# Patient Record
Sex: MALE | Race: AMERICAN INDIAN OR ALASKA NATIVE
[De-identification: names, ages, dates, MRNs, and addresses within clinical notes are randomized per-mention and may not be internally consistent; named-entity substitution may affect disease eponyms.]

---

## 2017-06-13 NOTE — EMERGENCY DEPARTMENT REPORT
ED N/V/D HPI





- General


Chief complaint: Abdominal Pain


Stated complaint: STOMACH CRAMPS


Time Seen by Provider: 06/13/17 07:20


Source: patient


Mode of arrival: Ambulatory


Limitations: No Limitations





- History of Present Illness


Initial comments: 





Travels often for work.  Reports bilateral leg pain


MD complaint: diarrhea


-: Gradual, week(s)


Description of Vomiting: food contents, watery


Description of Diarrhea: water


Associated Abdominal Pain: Yes


Location: periumbillcal (cramping intermittent)


Radiation: none


Severity: mild


Pain Scale: 1


Quality: cramping


Consistency: intermittent


Improves with: none


Worsens with: eating


Context: other (history Type 1 diabetes reports compliant with medications, 

sugars controlled)


Associated Symptoms: denies: myalgias, chest pain, cough, diaphoresis, fever/

chills, headaches, loss of appetite, malaise, nausea/vomiting, rash, dysuria, 

shortness of breath, syncope, weakness





- Related Data


 Previous Rx's











 Medication  Instructions  Recorded  Last Taken  Type


 


Loperamide HCl [Imodium A-D] 2 mg PO Q12HR PRN #14 tablet 06/13/17 Unknown Rx











 Allergies











Allergy/AdvReac Type Severity Reaction Status Date / Time


 


No Known Allergies Allergy   Unverified 06/13/17 05:37














ED Review of Systems


ROS: 


Stated complaint: STOMACH CRAMPS


Other details as noted in HPI





Other: 





GENERAL: No weight change, fatigue, weakness, fever, chills, or night sweats


SKIN: No changes in skin or hair, no itching, no rashes, no jaundice


HEAD: No trauma, headache, or visual changes


EYES: No blurriness, tearing, itching, acute visual loss, conjunctival 

discoloration, or scleral icterus


EARS: No hearing loss,  tinnitus, vertigo, or earache


NOSE: No rhinorrhea, stuffiness, sneezing, itching, or epistaxis


MOUTH: No bleeding gums, hoarseness, sore throat, or swelling


CARDIAC: No new murmur, chest pain, palpitations, dyspnea on exertion, orthopnea

, PND, or edema


RESPIRATORY: No shortness of breath, wheeze, cough, sputum production, 

hemoptysis, pneumonia, asthma, bronchitis, or emphysema


GI: abdominal pain, diarrhea


URINARY: No frequency, urgency, polyuria, dysuria, hematuria, or incontinence


MUSCULOSKELETAL: No muscle weakness, joint stiffness, decrease in range of 

motion, redness, swelling, tenderness


NEUROLOGIC: No loss of sensation, numbness, tingling, tremors, weakness, 

paralysis, seizures


HEMATOLOGIC: No anemia, easy bruising, bleeding, petechiae, or purpura


ENDOCRINE: No hot or cold intolerance, sweating, polyuria, polydipsia or, 

polyphagia no thyroid problems


PSYCHIATRIC: No change in mood, no anxiety, no depression

















ED Past Medical Hx





- Past Medical History


Previous Medical History?: Yes


Hx Diabetes: Yes





- Surgical History


Past Surgical History?: No





- Social History


Smoking Status: Never Smoker





- Medications


Home Medications: 


 Home Medications











 Medication  Instructions  Recorded  Confirmed  Last Taken  Type


 


Loperamide HCl [Imodium A-D] 2 mg PO Q12HR PRN #14 tablet 06/13/17  Unknown Rx














ED Physical Exam





- General


Limitations: No Limitations





- Other


Other exam information: 





GENERAL: Patient in no acute distress


HEAD: Normocephalic, atraumatic


EYES: PERRLA, EOM intact, no scleral icterus, no conjunctival hemorrhage, 

visual fields and acuity wnl,


NOSE: No tenderness, discharge, sinus tenderness


MOUTH: No erythema, bleeding, exudate


HEART: Regular rate and rhythm, no murmur, S1-S2 are auscultated, pulses are 

symmetric


LUNGS: No wheezing, rales, rhonchi, bilateral breath sounds


ABDOMEN: Normal bowel sounds, no tenderness, no rebound, no guarding, no masses

, no CVA tenderness


MUSCULOSKELETAL: Normal joint range of motion, no redness, no swelling, no 

tenderness


NEUROLOGIC: GCS 15, Alert and Oriented x3, Cranial nerves intact, normal 

sensation, normal strength, normal gait, no cerebellar deficit


PSYCHIATRIC: No homicidal or suicidal ideation, no anxiety, no depression, no 

hallucinations


SKIN: Skin is warm and dry, no wounds, no rashes








ED Course


 Vital Signs











  06/13/17 06/13/17 06/13/17





  05:38 07:00 07:07


 


Temperature 97.8 F 97.8 F 


 


Pulse Rate 102 H 94 H 


 


Respiratory 18 20 20





Rate   


 


Blood Pressure 132/82  


 


Blood Pressure  127/93 





[Right]   


 


O2 Sat by Pulse 100 99 





Oximetry   














  06/13/17 06/13/17 06/13/17





  07:54 09:15 11:59


 


Temperature   98.3 F


 


Pulse Rate 93 H 90 89


 


Respiratory 16 16 16





Rate   


 


Blood Pressure   


 


Blood Pressure 115/86 130/92 140/99





[Right]   


 


O2 Sat by Pulse 100 100 100





Oximetry   














ED Medical Decision Making





- Lab Data


Result diagrams: 


 06/13/17 05:43





 06/13/17 07:43





- EKG Data


Interpretation: no acute changes





- Radiology Data


Radiology results: report reviewed





- Medical Decision Making





Patient comfortable.  Updated with results.  Plan discharge with outpatient 

follow-up.  Patient agrees with plan and will return if symptoms worsen.


Critical care attestation.: 


If time is entered above; I have spent that time in minutes in the direct care 

of this critically ill patient, excluding procedure time.








ED Disposition


Clinical Impression: 


 Biliary colic, Leg pain, bilateral





Diarrhea


Qualifiers:


 Diarrhea type: unspecified type Qualified Code(s): R19.7 - Diarrhea, 

unspecified





Abdominal pain


Qualifiers:


 Abdominal location: unspecified location Qualified Code(s): R10.9 - 

Unspecified abdominal pain





Uncontrolled diabetes mellitus


Qualifiers:


 Diabetes mellitus type: type 1 Diabetes mellitus complication status: with 

unspecified complications Qualified Code(s): E10.8 - Type 1 diabetes mellitus 

with unspecified complications





Disposition: DC-01 TO HOME OR SELFCARE


Is pt being admited?: No


Condition: Stable


Instructions:  Biliary Colic (ED), Acute Diarrhea (ED), Abdominal Pain (ED), 

Diabetic Hyperglycemia (ED)


Prescriptions: 


Loperamide HCl [Imodium A-D] 2 mg PO Q12HR PRN #14 tablet


 PRN Reason: Diarrhea


Referrals: 


PRIMARY CARE,MD [Primary Care Provider] - 2-3 Days


[facility] [Provider Group] - 2-3 Days


Time of Disposition: 12:05

## 2017-06-13 NOTE — ULTRASOUND REPORT
ULTRASOUND ABDOMEN COMPLETE



INDICATION: Right upper quadrant pain.  Evaluate for gallstones.



COMPARISON: None. 



FINDINGS: Abdominal sonography suggests slight diffuse hepatic 

echogenic coarsening.  Grossly preserved contours.  Right hepatic lobe 

though estimated at 18.7 cm in length.  No definite focal suspicious 

lesions or biliary dilatation. No gallstones, pericholecystic fluid or 

positive sonographic Vasquez's sign. Gallbladder though suboptimally 

distended with wall thickness of 2.5 mm.  Common bile duct is 2 mm.  

Homogenous, though slightly coarse spleen, approximately 7 cm in 

length.  No ascites.



Imaged pancreas, nonaneurysmal abdominal aorta and IVC within normal 

limits. No hydronephrosis.  Right kidney is 11.2 x 4.1 x 5.3 cm with 

cortical thickness of 1.3 cm.  Left kidney demonstrates AP renal pelvis 

diameter of 1.3 cm, possibly an extrarenal pelvis.  Left kidney 

measures 11 x 4.5 x 4.1 cm with cortical thickness of 1.3 cm.



CONCLUSION: 



1.  Suboptimally distended gallbladder without definite gallstones or 

other sonographic abnormality, as described.



2.  Questionable subtle fatty hepatic infiltration with possible 

hepatomegaly.  Please correlate.



Thank you for the opportunity to participate in this patient's care.

## 2017-06-13 NOTE — XRAY REPORT
ABDOMINAL SERIES



INDICATION: Pain.



COMPARISON: None similar at this institution.



FINDINGS: Abdominal series, three radiographs, demonstrate 

nonobstructive bowel gas pattern without focal suspicious 

calcifications, pneumatosis or pneumoperitoneum.  Possible hepatomegaly.



Accompanying chest radiograph demonstrates normal cardiomediastinal 

silhouette. Clear lungs.



Approximately 6 mm ossific density/spur at the right acetabulum 

laterally.



CONCLUSION: Hepatomegaly suspected without other acute process.  Please 

correlate.



Thank you for the opportunity to participate in this patient's care.

## 2017-06-14 NOTE — VASCULAR LAB REPORT
LOWER EXTREMITY VENOUS DUPLEX:



REASON FOR EXAM: Pain of the lower extremities.



COMMENTS ON THE RIGHT:

All veins visualized are freely compressible without evidence of

internal echogenicity.  Flow is spontaneous and phasic throughout.



COMMENTS ON THE LEFT:

All veins visualized are freely compressible without evidence of

internal echogenicity.  Flow is spontaneous and phasic throughout.



IMPRESSION:

No evidence of acute or chronic deep venous thrombosis in either

lower extremity.

## 2019-11-26 ENCOUNTER — HOSPITAL ENCOUNTER (INPATIENT)
Dept: HOSPITAL 5 - ED | Age: 39
LOS: 9 days | Discharge: HOME | DRG: 871 | End: 2019-12-05
Attending: HOSPITALIST | Admitting: INTERNAL MEDICINE
Payer: COMMERCIAL

## 2019-11-26 DIAGNOSIS — Z71.6: ICD-10-CM

## 2019-11-26 DIAGNOSIS — Z79.4: ICD-10-CM

## 2019-11-26 DIAGNOSIS — E87.0: ICD-10-CM

## 2019-11-26 DIAGNOSIS — E10.43: ICD-10-CM

## 2019-11-26 DIAGNOSIS — E10.649: ICD-10-CM

## 2019-11-26 DIAGNOSIS — D69.6: ICD-10-CM

## 2019-11-26 DIAGNOSIS — R65.20: ICD-10-CM

## 2019-11-26 DIAGNOSIS — E87.6: ICD-10-CM

## 2019-11-26 DIAGNOSIS — Z82.49: ICD-10-CM

## 2019-11-26 DIAGNOSIS — E43: ICD-10-CM

## 2019-11-26 DIAGNOSIS — E83.42: ICD-10-CM

## 2019-11-26 DIAGNOSIS — Z83.3: ICD-10-CM

## 2019-11-26 DIAGNOSIS — E83.39: ICD-10-CM

## 2019-11-26 DIAGNOSIS — A41.9: Primary | ICD-10-CM

## 2019-11-26 DIAGNOSIS — K31.84: ICD-10-CM

## 2019-11-26 DIAGNOSIS — N17.0: ICD-10-CM

## 2019-11-26 DIAGNOSIS — E10.10: ICD-10-CM

## 2019-11-26 DIAGNOSIS — I50.32: ICD-10-CM

## 2019-11-26 DIAGNOSIS — E86.0: ICD-10-CM

## 2019-11-26 DIAGNOSIS — Z79.899: ICD-10-CM

## 2019-11-26 DIAGNOSIS — F17.210: ICD-10-CM

## 2019-11-26 LAB
ALBUMIN SERPL-MCNC: 4.5 G/DL (ref 3.9–5)
ALT SERPL-CCNC: 18 UNITS/L (ref 7–56)
BACTERIA #/AREA URNS HPF: (no result) /HPF
BAND NEUTROPHILS # (MANUAL): 0 K/MM3
BILIRUB DIRECT SERPL-MCNC: < 0.2 MG/DL (ref 0–0.2)
BILIRUB UR QL STRIP: (no result)
BLOOD UR QL VISUAL: (no result)
BUN SERPL-MCNC: 16 MG/DL (ref 9–20)
BUN SERPL-MCNC: 17 MG/DL (ref 9–20)
BUN SERPL-MCNC: 17 MG/DL (ref 9–20)
BUN/CREAT SERPL: 10 %
BUN/CREAT SERPL: 11 %
BUN/CREAT SERPL: 12 %
CALCIUM SERPL-MCNC: 8.2 MG/DL (ref 8.4–10.2)
CALCIUM SERPL-MCNC: 8.4 MG/DL (ref 8.4–10.2)
CALCIUM SERPL-MCNC: 8.5 MG/DL (ref 8.4–10.2)
HCT VFR BLD CALC: 45.3 % (ref 35.5–45.6)
HEMOLYSIS INDEX: 0
HEMOLYSIS INDEX: 17
HEMOLYSIS INDEX: 33
HGB BLD-MCNC: 14.1 GM/DL (ref 11.8–15.2)
MCHC RBC AUTO-ENTMCNC: 31 % (ref 32–34)
MCV RBC AUTO: 91 FL (ref 84–94)
MUCOUS THREADS #/AREA URNS HPF: (no result) /HPF
MYELOCYTES # (MANUAL): 0 K/MM3
PH UR STRIP: 5 [PH] (ref 5–7)
PLATELET # BLD: 347 K/MM3 (ref 140–440)
PROMYELOCYTES # (MANUAL): 0 K/MM3
RBC # BLD AUTO: 4.98 M/MM3 (ref 3.65–5.03)
RBC #/AREA URNS HPF: 3 /HPF (ref 0–6)
TOTAL CELLS COUNTED BLD: 100
UROBILINOGEN UR-MCNC: < 2 MG/DL (ref ?–2)
WBC #/AREA URNS HPF: 1 /HPF (ref 0–6)

## 2019-11-26 PROCEDURE — 85025 COMPLETE CBC W/AUTO DIFF WBC: CPT

## 2019-11-26 PROCEDURE — 36415 COLL VENOUS BLD VENIPUNCTURE: CPT

## 2019-11-26 PROCEDURE — 82550 ASSAY OF CK (CPK): CPT

## 2019-11-26 PROCEDURE — 83690 ASSAY OF LIPASE: CPT

## 2019-11-26 PROCEDURE — 94660 CPAP INITIATION&MGMT: CPT

## 2019-11-26 PROCEDURE — 83880 ASSAY OF NATRIURETIC PEPTIDE: CPT

## 2019-11-26 PROCEDURE — 36600 WITHDRAWAL OF ARTERIAL BLOOD: CPT

## 2019-11-26 PROCEDURE — 84484 ASSAY OF TROPONIN QUANT: CPT

## 2019-11-26 PROCEDURE — 71045 X-RAY EXAM CHEST 1 VIEW: CPT

## 2019-11-26 PROCEDURE — 80307 DRUG TEST PRSMV CHEM ANLYZR: CPT

## 2019-11-26 PROCEDURE — 82962 GLUCOSE BLOOD TEST: CPT

## 2019-11-26 PROCEDURE — 94760 N-INVAS EAR/PLS OXIMETRY 1: CPT

## 2019-11-26 PROCEDURE — 71046 X-RAY EXAM CHEST 2 VIEWS: CPT

## 2019-11-26 PROCEDURE — 80053 COMPREHEN METABOLIC PANEL: CPT

## 2019-11-26 PROCEDURE — 84145 PROCALCITONIN (PCT): CPT

## 2019-11-26 PROCEDURE — 71275 CT ANGIOGRAPHY CHEST: CPT

## 2019-11-26 PROCEDURE — 80048 BASIC METABOLIC PNL TOTAL CA: CPT

## 2019-11-26 PROCEDURE — 85007 BL SMEAR W/DIFF WBC COUNT: CPT

## 2019-11-26 PROCEDURE — 93306 TTE W/DOPPLER COMPLETE: CPT

## 2019-11-26 PROCEDURE — 93970 EXTREMITY STUDY: CPT

## 2019-11-26 PROCEDURE — 80076 HEPATIC FUNCTION PANEL: CPT

## 2019-11-26 PROCEDURE — 85027 COMPLETE CBC AUTOMATED: CPT

## 2019-11-26 PROCEDURE — 81001 URINALYSIS AUTO W/SCOPE: CPT

## 2019-11-26 PROCEDURE — 93005 ELECTROCARDIOGRAM TRACING: CPT

## 2019-11-26 PROCEDURE — 85379 FIBRIN DEGRADATION QUANT: CPT

## 2019-11-26 PROCEDURE — 83735 ASSAY OF MAGNESIUM: CPT

## 2019-11-26 PROCEDURE — 87040 BLOOD CULTURE FOR BACTERIA: CPT

## 2019-11-26 PROCEDURE — 82803 BLOOD GASES ANY COMBINATION: CPT

## 2019-11-26 PROCEDURE — 82553 CREATINE MB FRACTION: CPT

## 2019-11-26 PROCEDURE — 82805 BLOOD GASES W/O2 SATURATION: CPT

## 2019-11-26 PROCEDURE — 96365 THER/PROPH/DIAG IV INF INIT: CPT

## 2019-11-26 PROCEDURE — 86022 PLATELET ANTIBODIES: CPT

## 2019-11-26 PROCEDURE — 93010 ELECTROCARDIOGRAM REPORT: CPT

## 2019-11-26 PROCEDURE — 82140 ASSAY OF AMMONIA: CPT

## 2019-11-26 PROCEDURE — 84132 ASSAY OF SERUM POTASSIUM: CPT

## 2019-11-26 PROCEDURE — 84100 ASSAY OF PHOSPHORUS: CPT

## 2019-11-26 PROCEDURE — 83036 HEMOGLOBIN GLYCOSYLATED A1C: CPT

## 2019-11-26 RX ADMIN — INSULIN HUMAN SCH MLS/HR: 100 INJECTION, SOLUTION PARENTERAL at 20:42

## 2019-11-26 RX ADMIN — HEPARIN SODIUM SCH UNIT: 5000 INJECTION, SOLUTION INTRAVENOUS; SUBCUTANEOUS at 23:34

## 2019-11-26 RX ADMIN — Medication SCH ML: at 22:35

## 2019-11-26 RX ADMIN — SODIUM CHLORIDE SCH MLS/HR: 0.9 INJECTION, SOLUTION INTRAVENOUS at 23:36

## 2019-11-26 NOTE — EMERGENCY DEPARTMENT REPORT
ED General Adult HPI





- General


Chief complaint: Hyperglycemia


Stated complaint: N/V


Time Seen by Provider: 11/26/19 19:32


Source: patient


Mode of arrival: Ambulatory


Limitations: No Limitations





- History of Present Illness


Initial comments: 





39-year-old male with a past medical history of insulin dependent diabetes and 

gastroparesis presents to the hospital complaining of not feeling well the last 

2 days.  Today patient developed nausea, vomiting, or by mouth intolerance.  He 

states he has chronic intermittent diarrhea due to gastroparesis.  He ran out of

his Novolin 70/30 2 days ago.  He also has not taken his regular insulin 

slidding scale dosing in the last 2 days because he has not been eating.  He 

also does not check his glucose on a regular basis.


Severity scale (0 -10): 7





- Related Data


                                Home Medications











 Medication  Instructions  Recorded  Confirmed  Last Taken


 


Insulin NPH Human Isophane 15 units SQ QPM 11/26/19 11/26/19 Unknown





[Novolin N]    


 


Insulin NPH Human Isophane 20 units SQ QAM 11/26/19 11/26/19 Unknown





[Novolin N]    


 


Insulin Regular, Human [Novolin R] 100 unit IJ ACHS 11/26/19 11/26/19 Unknown











                                    Allergies











Allergy/AdvReac Type Severity Reaction Status Date / Time


 


No Known Allergies Allergy   Unverified 06/13/17 05:37














ED Review of Systems


ROS: 


Stated complaint: N/V


Other details as noted in HPI





Comment: All other systems reviewed and negative





ED Past Medical Hx





- Past Medical History


Previous Medical History?: Yes


Hx Diabetes: Yes


Additional medical history: Gastritis, "heart problems"





- Surgical History


Past Surgical History?: Yes


Additional Surgical History: colonoscopy





- Social History


Smoking Status: Former Smoker


Substance Use Type: None





- Medications


Home Medications: 


                                Home Medications











 Medication  Instructions  Recorded  Confirmed  Last Taken  Type


 


Insulin NPH Human Isophane 15 units SQ QPM 11/26/19 11/26/19 Unknown History





[Novolin N]     


 


Insulin NPH Human Isophane 20 units SQ QAM 11/26/19 11/26/19 Unknown History





[Novolin N]     


 


Insulin Regular, Human [Novolin R] 100 unit IJ ACHS 11/26/19 11/26/19 Unknown 

History














ED Physical Exam





- General


Limitations: No Limitations





- Other


Other exam information: 





General: No acute distress


Head: Atraumatic


Eyes: normal appearance


ENT: Dry mucous membranes


Neck: Normal appearance, no midline tenderness


Chest: Clear to auscultation bilaterally with tachypnea


CV: Tachycardia regular rhythm


Abdomen: Soft, normal bowel sounds, mild generalized tenderness, nondistended, 

no rebound or guarding


Back: Normal inspection


Extremity: Normal inspection infection, full range of motion


Neuro: Alert O x 3, no facial asymmetry, speech clear, no gross motor sensory 

deficit


Psych: Appropriate behavior


Skin: No rash





ED Course


                                   Vital Signs











  11/26/19





  18:22


 


Temperature 97.4 F L


 


Pulse Rate 109 H


 


Respiratory 21





Rate 


 


Blood Pressure 127/71





[Left] 


 


O2 Sat by Pulse 100





Oximetry 














ED Medical Decision Making





- Lab Data


Result diagrams: 


                                 11/26/19 20:14





                                 11/26/19 19:40








                                   Lab Results











  11/26/19 11/26/19 11/26/19 Range/Units





  18:43 18:43 18:43 


 


VBG pH     (7.320-7.420)  


 


Sodium   134 L   (137-145)  mmol/L


 


Potassium   4.2   (3.6-5.0)  mmol/L


 


Chloride   92.4 L   ()  mmol/L


 


Carbon Dioxide   3 L*   (22-30)  mmol/L


 


Anion Gap   43   mmol/L


 


BUN   16   (9-20)  mg/dL


 


Creatinine   1.6 H   (0.8-1.5)  mg/dL


 


Estimated GFR   59   ml/min


 


BUN/Creatinine Ratio   10   %


 


Glucose   581 H*   ()  mg/dL


 


POC Glucose     ()  


 


Calcium   8.5   (8.4-10.2)  mg/dL


 


Phosphorus  5.50 H    (2.5-4.5)  mg/dL


 


Magnesium  2.30    (1.7-2.3)  mg/dL


 


Total Bilirubin    < 0.20  (0.1-1.2)  mg/dL


 


Direct Bilirubin    < 0.2  (0-0.2)  mg/dL


 


Indirect Bilirubin    0.0  mg/dL


 


AST    11  (5-40)  units/L


 


ALT    18  (7-56)  units/L


 


Alkaline Phosphatase    166 H  ()  units/L


 


Total Protein    8.0  (6.3-8.2)  g/dL


 


Albumin    4.5  (3.9-5)  g/dL


 


Albumin/Globulin Ratio    1.3  %


 


Lipase     (13-60)  units/L














  11/26/19 11/26/19 11/26/19 Range/Units





  18:43 18:43 19:04 


 


VBG pH  6.993 L*    (7.320-7.420)  


 


Sodium     (137-145)  mmol/L


 


Potassium     (3.6-5.0)  mmol/L


 


Chloride     ()  mmol/L


 


Carbon Dioxide     (22-30)  mmol/L


 


Anion Gap     mmol/L


 


BUN     (9-20)  mg/dL


 


Creatinine     (0.8-1.5)  mg/dL


 


Estimated GFR     ml/min


 


BUN/Creatinine Ratio     %


 


Glucose     ()  mg/dL


 


POC Glucose    437 H  ()  


 


Calcium     (8.4-10.2)  mg/dL


 


Phosphorus     (2.5-4.5)  mg/dL


 


Magnesium     (1.7-2.3)  mg/dL


 


Total Bilirubin     (0.1-1.2)  mg/dL


 


Direct Bilirubin     (0-0.2)  mg/dL


 


Indirect Bilirubin     mg/dL


 


AST     (5-40)  units/L


 


ALT     (7-56)  units/L


 


Alkaline Phosphatase     ()  units/L


 


Total Protein     (6.3-8.2)  g/dL


 


Albumin     (3.9-5)  g/dL


 


Albumin/Globulin Ratio     %


 


Lipase   64 H   (13-60)  units/L














  11/26/19 Range/Units





  19:40 


 


VBG pH   (7.320-7.420)  


 


Sodium  135 L  (137-145)  mmol/L


 


Potassium  3.8  (3.6-5.0)  mmol/L


 


Chloride  94.4 L  ()  mmol/L


 


Carbon Dioxide  2 L*  (22-30)  mmol/L


 


Anion Gap  42  mmol/L


 


BUN  17  (9-20)  mg/dL


 


Creatinine  1.6 H  (0.8-1.5)  mg/dL


 


Estimated GFR  59  ml/min


 


BUN/Creatinine Ratio  11  %


 


Glucose  536 H*  ()  mg/dL


 


POC Glucose   ()  


 


Calcium  8.4  (8.4-10.2)  mg/dL


 


Phosphorus  5.10 H  (2.5-4.5)  mg/dL


 


Magnesium  2.30  (1.7-2.3)  mg/dL


 


Total Bilirubin   (0.1-1.2)  mg/dL


 


Direct Bilirubin   (0-0.2)  mg/dL


 


Indirect Bilirubin   mg/dL


 


AST   (5-40)  units/L


 


ALT   (7-56)  units/L


 


Alkaline Phosphatase   ()  units/L


 


Total Protein   (6.3-8.2)  g/dL


 


Albumin   (3.9-5)  g/dL


 


Albumin/Globulin Ratio   %


 


Lipase   (13-60)  units/L














- EKG Data


-: EKG Interpreted by Me


EKG shows normal: sinus rhythm, ST-T waves (lvh, with repol, no stemi)





- Medical Decision Making





Presents to the ED with DKA likely secondary to medication noncompliance.  

Severe acidosis noted.  Patient admitted to the hospitalist service.  Insulin 

bolus, insulin drip, normal saline, morphine, and Zofran initiated in the ED D. 

 Chest x-ray ordered.  Sepsis protocol initiated by hospitalist.





- Differential Diagnosis


DKA, infection, noncompliance


Critical Care Time: No


Critical care attestation.: 


If time is entered above; I have spent that time in minutes in the direct care 

of this critically ill patient, excluding procedure time.








ED Disposition


Clinical Impression: 


 DKA (diabetic ketoacidoses)





Disposition: DC-09 OP ADMIT IP TO THIS HOSP


Is pt being admited?: Yes


Condition: Stable


Time of Disposition: 20:04 (Dr Meade/hosp)

## 2019-11-26 NOTE — XRAY REPORT
CHEST 1 VIEW 



INDICATION: 

leukocytosis, dka.



COMPARISON: 

None.



FINDINGS:

Support devices: None.



Heart: Within normal limits. 

Lungs/Pleura: No acute air space or interstitial disease. 



Additional findings: None.



IMPRESSION: No acute abnormality.



Signer Name: Eriberto Kuo MD 

Signed: 11/26/2019 10:50 PM

 Workstation Name: [id number]

## 2019-11-26 NOTE — HISTORY AND PHYSICAL REPORT
History of Present Illness


Chief complaint: 





I feel real sick


History of present illness: 


40 YO Male with DM, Gastritis presents to ED for evaluation. Pt states that he 

has experienced nausea, and multiple episodes of vomiting over the past 1 day 

with persistent symptoms over the same time frame. Pt transported to [facility] via 

private vehicle. Pt seen and evaluated in ED and found to have DKA, Sepsis, 

Acidosis, and ARIANNA. Pt found to be critically ill and admitted to ICU and 

initiated on DKA and Sepsis protocols. Pt denies fever, chills, CP, 

palpitations, Trauma, BRBPR, Productive cough, skin rash or recent ill contacts.

Pulmonary team consulted in ED.





Past History


Past Medical History: other (see HPI)


Past Surgical History: No surgical history, Other (reviewed)


Social history: single.  denies: smoking, alcohol abuse, prescription drug abuse


Family history: diabetes, hypertension





Medications and Allergies


                                    Allergies











Allergy/AdvReac Type Severity Reaction Status Date / Time


 


No Known Allergies Allergy   Unverified 06/13/17 05:37











                                Home Medications











 Medication  Instructions  Recorded  Confirmed  Last Taken  Type


 


Insulin NPH Human Isophane 15 units SQ QPM 11/26/19 11/26/19 Unknown History





[Novolin N]     


 


Insulin NPH Human Isophane 20 units SQ QAM 11/26/19 11/26/19 Unknown History





[Novolin N]     


 


Insulin Regular, Human [Novolin R] 100 unit IJ ACHS 11/26/19 11/26/19 Unknown 

History











Active Meds: 


Active Medications





Dextrose (D50w (25gm) Syringe)  0 ml IV Q30MIN PRN; Protocol


   PRN Reason: Hypoglycemia


Insulin Human Regular 100 (units/ Sodium Chloride)  100 mls @ 1 mls/hr IV TITR 

SAI; Protocol


Sodium Chloride (Sodium Chloride Flush Syringe 10 Ml)  10 ml IV BID SAI


Sodium Chloride (Sodium Chloride Flush Syringe 10 Ml)  10 ml IV PRN PRN


   PRN Reason: LINE FLUSH











Review of Systems


Constitutional: no weight loss, no weight gain, no fever, no chills


Ears, nose, mouth and throat: no ear pain, no ear discharge, no decreased 

hearing, no nose pain


Cardiovascular: no chest pain, no orthopnea, no palpitations, no rapid/irregular

heart beat, no syncope


Respiratory: no cough, no cough with sputum, no excessive sputum, no hemoptysis


Gastrointestinal: nausea, vomiting, no diarrhea, no constipation, no change in 

bowel habits


Genitourinary Male: no hematuria, no flank pain, no discharge, no urinary 

hesitancy


Rectal: no pain, no incontinence, no bleeding


Musculoskeletal: no neck stiffness, no neck pain, no shooting arm pain, no arm 

numbness/tingling, no shooting leg pain


Integumentary: no rash, no pruritis, no redness, no sores, no jaundice


Neurological: no transient paralysis, no paralysis, no weakness, no numbness, no

tingling, no seizures


Psychiatric: no anxiety, no change in sleep habits, no sleep disturbances, no 

hypersomnia, no change in libido, no suicidal ideation


Endocrine: no cold intolerance, no heat intolerance, no polyphagia, no 

polydipsia, no polyuria, no nocturia


Hematologic/Lymphatic: no easy bruising, no easy bleeding, no lymphadenopathy


Allergic/Immunologic: no urticaria, no allergic rhinitis, no persistent 

infections, no angioedema





Exam





- Constitutional


Vitals: 


                                        











Temp Pulse Resp BP Pulse Ox


 


 97.4 F L  109 H  21   127/71   100 


 


 11/26/19 18:22  11/26/19 18:22  11/26/19 18:22  11/26/19 18:22  11/26/19 18:22











General appearance: Present: severe distress





- EENT


Eyes: Present: PERRL


ENT: hearing intact, clear oral mucosa





- Neck


Neck: Present: supple, normal ROM





- Respiratory


Respiratory effort: normal


Respiratory: bilateral: CTA





- Cardiovascular


Heart Sounds: Present: S1 & S2.  Absent: rub, click





- Extremities


Extremities: pulses symmetrical, No edema


Peripheral Pulses: within normal limits





- Abdominal


General gastrointestinal: Present: soft, non-tender, non-distended, normal bowel

sounds


Male genitourinary: Present: normal





- Integumentary


Integumentary: Present: clear, warm, dry





- Musculoskeletal


Musculoskeletal: generalized weakness





- Psychiatric


Psychiatric: appropriate mood/affect, intact judgment & insight





- Neurologic


Neurologic: CNII-XII intact, moves all extremities





Results





- Labs


CBC & Chem 7: 


                                 11/26/19 20:14





                                 11/26/19 19:40


Labs: 


                              Abnormal lab results











  11/26/19 11/26/19 11/26/19 Range/Units





  18:43 18:43 18:43 


 


VBG pH     (7.320-7.420)  


 


Sodium   134 L   (137-145)  mmol/L


 


Chloride   92.4 L   ()  mmol/L


 


Carbon Dioxide   3 L*   (22-30)  mmol/L


 


Creatinine   1.6 H   (0.8-1.5)  mg/dL


 


Glucose   581 H*   ()  mg/dL


 


POC Glucose     ()  


 


Phosphorus  5.50 H    (2.5-4.5)  mg/dL


 


Alkaline Phosphatase    166 H  ()  units/L


 


Lipase     (13-60)  units/L














  11/26/19 11/26/19 11/26/19 Range/Units





  18:43 18:43 19:04 


 


VBG pH  6.993 L*    (7.320-7.420)  


 


Sodium     (137-145)  mmol/L


 


Chloride     ()  mmol/L


 


Carbon Dioxide     (22-30)  mmol/L


 


Creatinine     (0.8-1.5)  mg/dL


 


Glucose     ()  mg/dL


 


POC Glucose    437 H  ()  


 


Phosphorus     (2.5-4.5)  mg/dL


 


Alkaline Phosphatase     ()  units/L


 


Lipase   64 H   (13-60)  units/L














Assessment and Plan





- Patient Problems


(1) DKA (diabetic ketoacidoses)


Current Visit: No   Status: Acute   


Plan to address problem: 


Admit to ICU, IVF resuscitation therapy, serial BMP, serial lactic acid, Insulin

drip. 





The high probability of a clinically significant, sudden or life threatening 

deterioration of the [Neuro, renal, respiratory] system(s) required my full and 

direct attention, intervention and personal management. The aggregate critical 

care time was [65] minutes. This time is in addition to time spent performing 

reported procedures but includes the following: 





[x] Data Review and interpretation 





[x] Patient assessment and monitoring of vital signs 





[x] Documentation 





[x] Medication orders and management








(2) Sepsis


Current Visit: Yes   Status: Acute   


Qualifiers: 


   Sepsis acute organ dysfunction status: with acute organ dysfunction 


Plan to address problem: 


Initiate Sepsis Protocol: IV antibiotic therapy, IVF resuscitation therapy, 

blood cultures, serial lactic acid, chest x ray, CBC, CMP, 








(3) Acidosis


Current Visit: Yes   Status: Acute   


Plan to address problem: 


Serial lactic acid level, IVF resuscitation therapy








(4) ARIANNA (acute kidney injury)


Current Visit: Yes   Status: Acute   


Plan to address problem: 


IVF resuscitation therapy, bmp, monitor uop q shift.








(5) DVT prophylaxis


Current Visit: Yes   Status: Acute   


Plan to address problem: 


SCD to BLE while in bed, prophylactic heparin

## 2019-11-27 LAB
BUN SERPL-MCNC: 12 MG/DL (ref 9–20)
BUN SERPL-MCNC: 12 MG/DL (ref 9–20)
BUN SERPL-MCNC: 14 MG/DL (ref 9–20)
BUN SERPL-MCNC: 9 MG/DL (ref 9–20)
BUN SERPL-MCNC: 9 MG/DL (ref 9–20)
BUN/CREAT SERPL: 12 %
BUN/CREAT SERPL: 6 %
BUN/CREAT SERPL: 8 %
BUN/CREAT SERPL: 8 %
BUN/CREAT SERPL: 9 %
CALCIUM SERPL-MCNC: 7.1 MG/DL (ref 8.4–10.2)
CALCIUM SERPL-MCNC: 7.5 MG/DL (ref 8.4–10.2)
CALCIUM SERPL-MCNC: 8.2 MG/DL (ref 8.4–10.2)
CALCIUM SERPL-MCNC: 8.3 MG/DL (ref 8.4–10.2)
CALCIUM SERPL-MCNC: 8.4 MG/DL (ref 8.4–10.2)
HEMOLYSIS INDEX: 10
HEMOLYSIS INDEX: 12
HEMOLYSIS INDEX: 4
HEMOLYSIS INDEX: 4
HEMOLYSIS INDEX: 5

## 2019-11-27 RX ADMIN — POTASSIUM CHLORIDE SCH MLS/HR: 10 INJECTION, SOLUTION INTRAVENOUS at 14:32

## 2019-11-27 RX ADMIN — POTASSIUM CHLORIDE SCH MLS/HR: 10 INJECTION, SOLUTION INTRAVENOUS at 15:47

## 2019-11-27 RX ADMIN — POTASSIUM CHLORIDE SCH MLS/HR: 10 INJECTION, SOLUTION INTRAVENOUS at 11:53

## 2019-11-27 RX ADMIN — CEFTRIAXONE SODIUM SCH MLS/HR: 1 INJECTION, POWDER, FOR SOLUTION INTRAMUSCULAR; INTRAVENOUS at 10:34

## 2019-11-27 RX ADMIN — HEPARIN SODIUM SCH UNIT: 5000 INJECTION, SOLUTION INTRAVENOUS; SUBCUTANEOUS at 10:33

## 2019-11-27 RX ADMIN — POTASSIUM CHLORIDE SCH MLS/HR: 10 INJECTION, SOLUTION INTRAVENOUS at 05:35

## 2019-11-27 RX ADMIN — POTASSIUM CHLORIDE SCH MLS/HR: 10 INJECTION, SOLUTION INTRAVENOUS at 04:40

## 2019-11-27 RX ADMIN — Medication SCH ML: at 22:30

## 2019-11-27 RX ADMIN — HEPARIN SODIUM SCH UNIT: 5000 INJECTION, SOLUTION INTRAVENOUS; SUBCUTANEOUS at 22:29

## 2019-11-27 RX ADMIN — INSULIN LISPRO SCH UNIT: 100 INJECTION, SOLUTION INTRAVENOUS; SUBCUTANEOUS at 22:31

## 2019-11-27 RX ADMIN — POTASSIUM CHLORIDE SCH MLS/HR: 10 INJECTION, SOLUTION INTRAVENOUS at 06:17

## 2019-11-27 RX ADMIN — INSULIN HUMAN SCH MLS/HR: 100 INJECTION, SOLUTION PARENTERAL at 08:54

## 2019-11-27 RX ADMIN — POTASSIUM CHLORIDE SCH MLS/HR: 10 INJECTION, SOLUTION INTRAVENOUS at 12:53

## 2019-11-27 RX ADMIN — FAMOTIDINE SCH MG: 10 INJECTION, SOLUTION INTRAVENOUS at 22:30

## 2019-11-27 RX ADMIN — Medication SCH ML: at 10:34

## 2019-11-27 RX ADMIN — POTASSIUM CHLORIDE SCH MLS/HR: 10 INJECTION, SOLUTION INTRAVENOUS at 02:40

## 2019-11-27 RX ADMIN — SODIUM BICARBONATE SCH MLS/HR: 84 INJECTION, SOLUTION INTRAVENOUS at 15:47

## 2019-11-27 RX ADMIN — SODIUM CHLORIDE SCH MLS/HR: 0.9 INJECTION, SOLUTION INTRAVENOUS at 00:46

## 2019-11-27 RX ADMIN — POTASSIUM CHLORIDE SCH MLS/HR: 10 INJECTION, SOLUTION INTRAVENOUS at 13:52

## 2019-11-27 RX ADMIN — INSULIN HUMAN SCH MLS/HR: 100 INJECTION, SOLUTION PARENTERAL at 12:47

## 2019-11-27 RX ADMIN — POTASSIUM CHLORIDE SCH MLS/HR: 10 INJECTION, SOLUTION INTRAVENOUS at 16:47

## 2019-11-27 NOTE — PROGRESS NOTE
Assessment and Plan


Assessment and plan: 


--DKA (diabetic ketoacidoses)


Current Visit: No   Status: Acute   


On DKA pathway , insulin drip


IVF resuscitation therapy, serial BMP, 





--Severe dehydration;


Vigorous IV hydration





--Sepsis


Current Visit: Yes   Status: Acute   


Initiate Sepsis Protocol: IV antibiotic therapy, 


IVF resuscitation therapy, blood cultures, 





--Hypokalemia;


 replenish per protocol and monitor levels





--Severe Metabolic Acidosis


Current Visit: Yes   Status: Acute  


Aggressive IV hydration, 


IV sodium bicarbonate supportive care 





-- ARIANNA (acute kidney injury)


Current Visit: Yes   Status: Acute   


Vasomotor nephropathy IVF resuscitation therapy, bmp, monitor uop q shift.





-- DVT prophylaxis


Current Visit: Yes   Status: Acute   


SCD to BLE while in bed, prophylactic heparin








The high probability of a clinically significant, sudden or life threatening 

deterioration of the [Neuro, renal,  


respiratory] system(s) required my full and direct attention, intervention and 

personal management. 


The aggregate critical care time was [35] minutes. This time is in addition to 

time spent performing 


reported procedures but includes the following: 





[x] Data Review and interpretation 





[x] Patient assessment and monitoring of vital signs 





[x] Documentation 





[x] Medication orders and management








History


Interval history: 


Patient seen and examined medical records reviewed


Admitted with DKA on DKA protocol


Severe acidosis, elevated anion gap


Patient is severely dehydrated


Mild distress


Vital signs noted








Hospitalist Physical





- Constitutional


Vitals: 


                                        











Temp Pulse Resp BP Pulse Ox


 


 97.4 F L  124 H  25 H  112/64   100 


 


 11/26/19 18:22  11/27/19 10:41  11/27/19 10:41  11/27/19 10:41  11/27/19 10:41











General appearance: Present: mild distress, well-nourished, other (severely 

dehydrated )





- EENT


Eyes: Present: PERRL, EOM intact





- Neck


Neck: Present: supple, normal ROM





- Respiratory


Respiratory effort: normal


Respiratory: bilateral: diminished, negative: rales, rhonchi, wheezing





- Cardiovascular


Rhythm: regular


Heart Sounds: Present: S1 & S2





- Extremities


Extremities: no ischemia, No edema





- Abdominal


General gastrointestinal: soft, non-tender, non-distended, normal bowel sounds





- Integumentary


Integumentary: Present: clear, warm





- Psychiatric


Psychiatric: appropriate mood/affect, cooperative





- Neurologic


Neurologic: moves all extremities





Results





- Labs


CBC & Chem 7: 


                                 11/26/19 20:14





                                 11/27/19 13:36


Labs: 


                             Laboratory Last Values











WBC  24.8 K/mm3 (4.5-11.0)  H  11/26/19  20:14    


 


RBC  4.98 M/mm3 (3.65-5.03)   11/26/19  20:14    


 


Hgb  14.1 gm/dl (11.8-15.2)   11/26/19  20:14    


 


Hct  45.3 % (35.5-45.6)   11/26/19  20:14    


 


MCV  91 fl (84-94)   11/26/19  20:14    


 


MCH  28 pg (28-32)   11/26/19  20:14    


 


MCHC  31 % (32-34)  L  11/26/19  20:14    


 


RDW  14.5 % (13.2-15.2)   11/26/19  20:14    


 


Plt Count  347 K/mm3 (140-440)   11/26/19  20:14    


 


Add Manual Diff  Complete   11/26/19  20:14    


 


Total Counted  100   11/26/19  20:14    


 


Seg Neuts % (Manual)  89.0 % (40.0-70.0)  H  11/26/19  20:14    


 


Band Neutrophils %  0 %  11/26/19  20:14    


 


Lymphocytes % (Manual)  7.0 % (13.4-35.0)  L  11/26/19  20:14    


 


Reactive Lymphs % (Man)  0 %  11/26/19  20:14    


 


Monocytes % (Manual)  4.0 % (0.0-7.3)   11/26/19  20:14    


 


Eosinophils % (Manual)  0 % (0.0-4.3)   11/26/19  20:14    


 


Basophils % (Manual)  0 % (0.0-1.8)   11/26/19  20:14    


 


Metamyelocytes %  0 %  11/26/19  20:14    


 


Myelocytes %  0 %  11/26/19  20:14    


 


Promyelocytes %  0 %  11/26/19  20:14    


 


Blast Cells %  0 %  11/26/19  20:14    


 


Nucleated RBC %  Not Reportable   11/26/19  20:14    


 


Seg Neutrophils # Man  22.1 K/mm3 (1.8-7.7)  H  11/26/19  20:14    


 


Band Neutrophils #  0.0 K/mm3  11/26/19  20:14    


 


Lymphocytes # (Manual)  1.7 K/mm3 (1.2-5.4)   11/26/19  20:14    


 


Abs React Lymphs (Man)  0.0 K/mm3  11/26/19  20:14    


 


Monocytes # (Manual)  1.0 K/mm3 (0.0-0.8)  H  11/26/19  20:14    


 


Eosinophils # (Manual)  0.0 K/mm3 (0.0-0.4)   11/26/19  20:14    


 


Basophils # (Manual)  0.0 K/mm3 (0.0-0.1)   11/26/19  20:14    


 


Metamyelocytes #  0.0 K/mm3  11/26/19  20:14    


 


Myelocytes #  0.0 K/mm3  11/26/19  20:14    


 


Promyelocytes #  0.0 K/mm3  11/26/19  20:14    


 


Blast Cells #  0.0 K/mm3  11/26/19  20:14    


 


WBC Morphology  Not Reportable   11/26/19  20:14    


 


Hypersegmented Neuts  Not Reportable   11/26/19  20:14    


 


Hyposegmented Neuts  Not Reportable   11/26/19  20:14    


 


Hypogranular Neuts  Not Reportable   11/26/19  20:14    


 


Smudge Cells  Not Reportable   11/26/19  20:14    


 


Toxic Granulation  Not Reportable   11/26/19  20:14    


 


Toxic Vacuolation  Not Reportable   11/26/19  20:14    


 


Dohle Bodies  Not Reportable   11/26/19  20:14    


 


Pelger-Huet Anomaly  Not Reportable   11/26/19  20:14    


 


Jose Rods  Not Reportable   11/26/19  20:14    


 


Platelet Estimate  Not Reportable   11/26/19  20:14    


 


Clumped Platelets  Not Reportable   11/26/19  20:14    


 


Plt Clumps, EDTA  Not Reportable   11/26/19  20:14    


 


Large Platelets  Not Reportable   11/26/19  20:14    


 


Giant Platelets  Not Reportable   11/26/19  20:14    


 


Platelet Satelliting  Not Reportable   11/26/19  20:14    


 


Plt Morphology Comment  Not Reportable   11/26/19  20:14    


 


RBC Morphology  Normal   11/26/19  20:14    


 


Dimorphic RBCs  Not Reportable   11/26/19  20:14    


 


Polychromasia  Not Reportable   11/26/19  20:14    


 


Hypochromasia  Not Reportable   11/26/19  20:14    


 


Poikilocytosis  Not Reportable   11/26/19  20:14    


 


Anisocytosis  Not Reportable   11/26/19  20:14    


 


Microcytosis  Not Reportable   11/26/19  20:14    


 


Macrocytosis  Not Reportable   11/26/19  20:14    


 


Spherocytes  Not Reportable   11/26/19  20:14    


 


Pappenheimer Bodies  Not Reportable   11/26/19  20:14    


 


Sickle Cells  Not Reportable   11/26/19  20:14    


 


Target Cells  Not Reportable   11/26/19  20:14    


 


Tear Drop Cells  Not Reportable   11/26/19  20:14    


 


Ovalocytes  Not Reportable   11/26/19  20:14    


 


Helmet Cells  Not Reportable   11/26/19  20:14    


 


[place] Bodies  Not Reportable   11/26/19  20:14    


 


Cabot Rings  Not Reportable   11/26/19  20:14    


 


[place] Cells  Not Reportable   11/26/19  20:14    


 


Bite Cells  Not Reportable   11/26/19  20:14    


 


Crenated Cell  Not Reportable   11/26/19  20:14    


 


Elliptocytes  Not Reportable   11/26/19  20:14    


 


Acanthocytes (Spur)  Not Reportable   11/26/19  20:14    


 


Rouleaux  Not Reportable   11/26/19  20:14    


 


Hemoglobin C Crystals  Not Reportable   11/26/19  20:14    


 


Schistocytes  Not Reportable   11/26/19  20:14    


 


Malaria parasites  Not Reportable   11/26/19  20:14    


 


[place] Bodies  Not Reportable   11/26/19  20:14    


 


Hem Pathologist Commnt  No   11/26/19  20:14    


 


VBG pH  6.993  (7.320-7.420)  L*  11/26/19  18:43    


 


Sodium  148 mmol/L (137-145)  H  11/27/19  10:03    


 


Potassium  3.1 mmol/L (3.6-5.0)  L  11/27/19  10:03    


 


Chloride  114.1 mmol/L ()  H  11/27/19  10:03    


 


Carbon Dioxide  4 mmol/L (22-30)  L*  11/27/19  10:03    


 


Anion Gap  33 mmol/L  11/27/19  10:03    


 


BUN  12 mg/dL (9-20)   11/27/19  10:03    


 


Creatinine  1.5 mg/dL (0.8-1.5)   11/27/19  10:03    


 


Estimated GFR  > 60 ml/min  11/27/19  10:03    


 


BUN/Creatinine Ratio  8 %  11/27/19  10:03    


 


Glucose  364 mg/dL ()  H  11/27/19  10:03    


 


POC Glucose  321  ()  H  11/27/19  10:42    


 


Lactic Acid  1.40 mmol/L (0.7-2.0)   11/27/19  04:13    


 


Calcium  8.3 mg/dL (8.4-10.2)  L  11/27/19  10:03    


 


Phosphorus  5.10 mg/dL (2.5-4.5)  H  11/26/19  19:40    


 


Magnesium  2.30 mg/dL (1.7-2.3)   11/26/19  19:40    


 


Total Bilirubin  < 0.20 mg/dL (0.1-1.2)   11/26/19  18:43    


 


Direct Bilirubin  < 0.2 mg/dL (0-0.2)   11/26/19  18:43    


 


Indirect Bilirubin  0.0 mg/dL  11/26/19  18:43    


 


AST  11 units/L (5-40)   11/26/19  18:43    


 


ALT  18 units/L (7-56)   11/26/19  18:43    


 


Alkaline Phosphatase  166 units/L ()  H  11/26/19  18:43    


 


Total Protein  8.0 g/dL (6.3-8.2)   11/26/19  18:43    


 


Albumin  4.5 g/dL (3.9-5)   11/26/19  18:43    


 


Albumin/Globulin Ratio  1.3 %  11/26/19  18:43    


 


Lipase  64 units/L (13-60)  H  11/26/19  18:43    


 


Urine Color  Straw  (Yellow)   11/26/19  21:29    


 


Urine Turbidity  Clear  (Clear)   11/26/19  21:29    


 


Urine pH  5.0  (5.0-7.0)   11/26/19  21:29    


 


Ur Specific Gravity  1.013  (1.003-1.030)   11/26/19  21:29    


 


Urine Protein  100 mg/dl mg/dL (Negative)   11/26/19  21:29    


 


Urine Glucose (UA)  >=500 mg/dL (Negative)   11/26/19  21:29    


 


Urine Ketones  80 mg/dL (Negative)   11/26/19  21:29    


 


Urine Blood  Sm  (Negative)   11/26/19  21:29    


 


Urine Nitrite  Neg  (Negative)   11/26/19  21:29    


 


Urine Bilirubin  Neg  (Negative)   11/26/19  21:29    


 


Urine Urobilinogen  < 2.0 mg/dL (<2.0)   11/26/19  21:29    


 


Ur Leukocyte Esterase  Neg  (Negative)   11/26/19  21:29    


 


Urine WBC (Auto)  1.0 /HPF (0.0-6.0)   11/26/19  21:29    


 


Urine RBC (Auto)  3.0 /HPF (0.0-6.0)   11/26/19  21:29    


 


U Epithel Cells (Auto)  1.0 /HPF (0-13.0)   11/26/19  21:29    


 


Urine Bacteria (Auto)  1+ /HPF (Negative)   11/26/19  21:29    


 


Urine Mucus  Few /HPF  11/26/19  21:29    














Active Medications





- Current Medications


Current Medications: 














Generic Name Dose Route Start Last Admin





  Trade Name Freq  PRN Reason Stop Dose Admin


 


Dextrose  0 ml  11/26/19 17:49 





  D50w (25gm) Syringe  IV  





  Q30MIN PRN  





  Hypoglycemia  





  Protocol  


 


Heparin Sodium (Porcine)  5,000 unit  11/26/19 22:00  11/27/19 10:33





  Heparin  SUB-Q   5,000 unit





  Q12HR SAI   Administration


 


Insulin Human Regular 100  100 mls @ 1 mls/hr  11/26/19 20:00  11/27/19 11:17





  units/ Sodium Chloride  IV   20 units/hr





  TITR SAI   20 mls/hr





    Titration





  Protocol  





  1 UNITS/HR  


 


Sodium Chloride  1,000 mls @ 0 mls/hr  11/26/19 21:00  11/27/19 00:46





  Nacl 0.9% 1000 Ml  IV  11/27/19 21:01  999 mls/hr





  ONCE SAI   Administration





  As Directed  


 


Ceftriaxone Sodium  1 gm in 50 mls @ 100 mls/hr  11/27/19 10:00  11/27/19 10:34





  Rocephin/Ns 1 Gm/50 Ml  IV   100 mls/hr





  Q24HR SAI   Administration





  Protocol  


 


Potassium Chloride 20 meq/  1,010 mls @ 125 mls/hr  11/27/19 02:00  11/27/19 

08:45





  Dextrose/Sodium Chloride  IV   0 mls/hr





  AS DIRECT SAI   Infusion


 


Potassium Chloride  10 meq in 100 mls @ 100 mls/hr  11/27/19 11:00 





  Kcl 10meq/100ml  IV  11/27/19 16:59 





  Q1H SAI  


 


Sodium Bicarbonate 50 meq/  1,050 mls @ 150 mls/hr  11/27/19 11:30 





  Sodium Chloride  IV  





  AS DIRECT SAI  


 


Sodium Chloride  10 ml  11/26/19 22:00  11/27/19 10:34





  Sodium Chloride Flush Syringe 10 Ml  IV   10 ml





  BID SAI   Administration


 


Sodium Chloride  10 ml  11/26/19 20:04 





  Sodium Chloride Flush Syringe 10 Ml  IV  





  PRN PRN  





  LINE FLUSH

## 2019-11-27 NOTE — CONSULTATION
History of Present Illness


Consult date: 11/27/19


Requesting physician: AMY J KOCHERLA


Reason for consult: other (Severe metabolic acidosis, diabetic ketoacidosis)


History of present illness: 





38 YO Male with DM, Gastritis presents to ED for evaluation. Pt states that he 

has experienced nausea, and multiple episodes of vomiting over the past 1 day 

with persistent symptoms over the same time frame. Pt transported to [facility] via 

private vehicle. Pt seen and evaluated in ED and found to have DKA, Sepsis, 

Acidosis, and ARIANNA. Pt found to be critically ill and admitted to ICU and 

initiated on DKA and Sepsis protocols. Pt denies fever, chills, CP, 

palpitations, Trauma, BRBPR, Productive cough, skin rash or recent ill contacts.





I have been consulted for critical care management





Patient has been seen and examined. Vitals, labs, medications, chart, reviewed.


He states he feels very sick. Nausea is better.





Past History


Past Medical History: other (see HPI)


Past Surgical History: No surgical history, Other (reviewed)


Social history: single.  denies: smoking, alcohol abuse, prescription drug abuse


Family history: diabetes, hypertension





Medications and Allergies


                                    Allergies











Allergy/AdvReac Type Severity Reaction Status Date / Time


 


No Known Allergies Allergy   Unverified 06/13/17 05:37











                                Home Medications











 Medication  Instructions  Recorded  Confirmed  Last Taken  Type


 


Insulin NPH Human Isophane 15 units SQ QPM 11/26/19 11/26/19 Unknown History





[Novolin N]     


 


Insulin NPH Human Isophane 20 units SQ QAM 11/26/19 11/26/19 Unknown History





[Novolin N]     


 


Insulin Regular, Human [Novolin R] 100 unit IJ ACHS 11/26/19 11/26/19 Unknown 

History











Active Meds: 


Active Medications





Dextrose (D50w (25gm) Syringe)  0 ml IV Q30MIN PRN; Protocol


   PRN Reason: Hypoglycemia


Heparin Sodium (Porcine) (Heparin)  5,000 unit SUB-Q Q12HR SAI


   Last Admin: 11/27/19 10:33 Dose:  5,000 unit


   Documented by: 


Insulin Human Regular 100 (units/ Sodium Chloride)  100 mls @ 1 mls/hr IV TITR 

SAI; Protocol


   Last Admin: 11/27/19 12:47 Dose:  12.5 units/hr, 12.5 mls/hr


   Documented by: 


Sodium Chloride (Nacl 0.9% 1000 Ml)  1,000 mls @ 0 mls/hr IV ONCE SAI


   Stop: 11/27/19 21:01


   Last Admin: 11/27/19 00:46 Dose:  999 mls/hr


   Documented by: 


Ceftriaxone Sodium (Rocephin/Ns 1 Gm/50 Ml)  1 gm in 50 mls @ 100 mls/hr IV 

Q24HR SAI; Protocol


   Last Admin: 11/27/19 10:34 Dose:  100 mls/hr


   Documented by: 


Potassium Chloride 20 meq/ (Dextrose/Sodium Chloride)  1,010 mls @ 125 mls/hr IV

AS DIRECT SAI


   Last Infusion: 11/27/19 08:45 Dose:  0 mls/hr


   Documented by: 


Potassium Chloride (Kcl 10meq/100ml)  10 meq in 100 mls @ 100 mls/hr IV Q1H SAI


   Stop: 11/27/19 16:59


Sodium Bicarbonate 50 meq/ (Sodium Chloride)  1,050 mls @ 150 mls/hr IV AS 

DIRECT SAI


   Last Admin: 11/27/19 12:49 Dose:  150 mls/hr


   Documented by: 


Sodium Chloride (Nacl 0.9% 1000 Ml)  1,000 mls @ 999 mls/hr IV BOLUS ONE


   Stop: 11/27/19 14:00


Sodium Chloride (Sodium Chloride Flush Syringe 10 Ml)  10 ml IV BID SAI


   Last Admin: 11/27/19 10:34 Dose:  10 ml


   Documented by: 


Sodium Chloride (Sodium Chloride Flush Syringe 10 Ml)  10 ml IV PRN PRN


   PRN Reason: LINE FLUSH











Physical Examination


Vital signs: 


                                   Vital Signs











Temp Pulse Resp BP Pulse Ox


 


 97.4 F L  109 H  21   127/71   100 


 


 11/26/19 18:22  11/26/19 18:22  11/26/19 18:22  11/26/19 18:22  11/26/19 18:22








Reviewed


General appearance: appears uncomfortable


ENT: oropharynx dry


Neck: supple, no JVD


Effort: mildly labored


Ascultation: Bilateral: diminished breath sounds


Cardiovascular: regular rate and rhythm, other (S1,S2, no murmurs)


Gastrointestinal: normoactive bowel sounds, soft, non-tender, non-distended


Integumentary: normal


Extremities: no cyanosis, no edema, no ischemia or petechiae


Musculoskeletal: no deformities


normal mental status, non-focal exam, pupils equal and round, motor strength 

normal and


mood appropriate, anxious





Results





- Laboratory Findings


CBC and BMP: 


                                 11/26/19 20:14





                                 11/27/19 10:03


Abnormal lab findings: 


                                  Abnormal Labs











  11/26/19 11/26/19 11/26/19





  18:43 18:43 18:43


 


WBC   


 


MCHC   


 


Seg Neuts % (Manual)   


 


Lymphocytes % (Manual)   


 


Seg Neutrophils # Man   


 


Monocytes # (Manual)   


 


VBG pH   


 


Sodium   134 L 


 


Potassium   


 


Chloride   92.4 L 


 


Carbon Dioxide   3 L* 


 


Creatinine   1.6 H 


 


Glucose   581 H* 


 


POC Glucose   


 


Lactic Acid   


 


Calcium   


 


Phosphorus  5.50 H  


 


Alkaline Phosphatase    166 H


 


Lipase   














  11/26/19 11/26/19 11/26/19





  18:43 18:43 19:04


 


WBC   


 


MCHC   


 


Seg Neuts % (Manual)   


 


Lymphocytes % (Manual)   


 


Seg Neutrophils # Man   


 


Monocytes # (Manual)   


 


VBG pH  6.993 L*  


 


Sodium   


 


Potassium   


 


Chloride   


 


Carbon Dioxide   


 


Creatinine   


 


Glucose   


 


POC Glucose    437 H


 


Lactic Acid   


 


Calcium   


 


Phosphorus   


 


Alkaline Phosphatase   


 


Lipase   64 H 














  11/26/19 11/26/19 11/26/19





  19:40 20:14 20:45


 


WBC   24.8 H 


 


MCHC   31 L 


 


Seg Neuts % (Manual)   89.0 H 


 


Lymphocytes % (Manual)   7.0 L 


 


Seg Neutrophils # Man   22.1 H 


 


Monocytes # (Manual)   1.0 H 


 


VBG pH   


 


Sodium  135 L  


 


Potassium   


 


Chloride  94.4 L  


 


Carbon Dioxide  2 L*  


 


Creatinine  1.6 H  


 


Glucose  536 H*  


 


POC Glucose    411 H


 


Lactic Acid   


 


Calcium   


 


Phosphorus  5.10 H  


 


Alkaline Phosphatase   


 


Lipase   














  11/26/19 11/26/19 11/26/19





  21:25 21:25 21:59


 


WBC   


 


MCHC   


 


Seg Neuts % (Manual)   


 


Lymphocytes % (Manual)   


 


Seg Neutrophils # Man   


 


Monocytes # (Manual)   


 


VBG pH   


 


Sodium   


 


Potassium  3.5 L  


 


Chloride   


 


Carbon Dioxide  5 L*  


 


Creatinine   


 


Glucose  370 H  


 


POC Glucose    289 H


 


Lactic Acid   2.80 H* 


 


Calcium  8.2 L  


 


Phosphorus   


 


Alkaline Phosphatase   


 


Lipase   














  11/26/19 11/27/19 11/27/19





  23:16 00:32 00:37


 


WBC   


 


MCHC   


 


Seg Neuts % (Manual)   


 


Lymphocytes % (Manual)   


 


Seg Neutrophils # Man   


 


Monocytes # (Manual)   


 


VBG pH   


 


Sodium    148 H


 


Potassium    3.0 L


 


Chloride    116.1 H


 


Carbon Dioxide    8 L*


 


Creatinine   


 


Glucose    135 H


 


POC Glucose  192 H  127 H 


 


Lactic Acid   


 


Calcium    7.1 L


 


Phosphorus   


 


Alkaline Phosphatase   


 


Lipase   














  11/27/19 11/27/19 11/27/19





  01:17 02:17 03:27


 


WBC   


 


MCHC   


 


Seg Neuts % (Manual)   


 


Lymphocytes % (Manual)   


 


Seg Neutrophils # Man   


 


Monocytes # (Manual)   


 


VBG pH   


 


Sodium   


 


Potassium   


 


Chloride   


 


Carbon Dioxide   


 


Creatinine   


 


Glucose   


 


POC Glucose  135 H  166 H  254 H


 


Lactic Acid   


 


Calcium   


 


Phosphorus   


 


Alkaline Phosphatase   


 


Lipase   














  11/27/19 11/27/19 11/27/19





  04:13 04:34 05:41


 


WBC   


 


MCHC   


 


Seg Neuts % (Manual)   


 


Lymphocytes % (Manual)   


 


Seg Neutrophils # Man   


 


Monocytes # (Manual)   


 


VBG pH   


 


Sodium  147 H  


 


Potassium  3.3 L  


 


Chloride  113.4 H  


 


Carbon Dioxide  7 L*  


 


Creatinine   


 


Glucose  320 H  


 


POC Glucose   320 H  333 H


 


Lactic Acid   


 


Calcium  7.5 L  


 


Phosphorus   


 


Alkaline Phosphatase   


 


Lipase   














  11/27/19 11/27/19 11/27/19





  06:20 07:27 07:53


 


WBC   


 


MCHC   


 


Seg Neuts % (Manual)   


 


Lymphocytes % (Manual)   


 


Seg Neutrophils # Man   


 


Monocytes # (Manual)   


 


VBG pH   


 


Sodium   


 


Potassium   


 


Chloride   


 


Carbon Dioxide   


 


Creatinine   


 


Glucose   


 


POC Glucose  363 H  343 H  319 H


 


Lactic Acid   


 


Calcium   


 


Phosphorus   


 


Alkaline Phosphatase   


 


Lipase   














  11/27/19 11/27/19 11/27/19





  08:46 09:45 10:03


 


WBC   


 


MCHC   


 


Seg Neuts % (Manual)   


 


Lymphocytes % (Manual)   


 


Seg Neutrophils # Man   


 


Monocytes # (Manual)   


 


VBG pH   


 


Sodium    148 H


 


Potassium    3.1 L


 


Chloride    114.1 H


 


Carbon Dioxide    4 L*


 


Creatinine   


 


Glucose    364 H


 


POC Glucose  402 H  340 H 


 


Lactic Acid   


 


Calcium    8.3 L


 


Phosphorus   


 


Alkaline Phosphatase   


 


Lipase   














  11/27/19





  10:42


 


WBC 


 


MCHC 


 


Seg Neuts % (Manual) 


 


Lymphocytes % (Manual) 


 


Seg Neutrophils # Man 


 


Monocytes # (Manual) 


 


VBG pH 


 


Sodium 


 


Potassium 


 


Chloride 


 


Carbon Dioxide 


 


Creatinine 


 


Glucose 


 


POC Glucose  321 H


 


Lactic Acid 


 


Calcium 


 


Phosphorus 


 


Alkaline Phosphatase 


 


Lipase 














Assessment and Plan


--DKA (diabetic ketoacidosis), severe metabolic acidosis


Current Visit: No   Status: Acute   


On DKA therapies per protocol


IVF resuscitation therapy, serial BMP, serial lactic acid, Insulin infusion


Serial VBGs





Severe dehydration


Vigorous IV hydration





Sepsis


Current Visit: Yes   Status: Acute   


Initiate Sepsis Protocol: IV antibiotic therapy, IVF resuscitation therapy, 

Blood cultures, 


Serial lactic acid, CXR, CBC, CMP, 





Hypokalemia


 replenish per protocol and monitor levels





Severe Metabolic Acidosis


Current Visit: Yes   Status: Acute  


Aggressive IV hydration, 


IV sodium bicarbonate supportive care 





ARIANNA (acute kidney injury)


Current Visit: Yes   Status: Acute   


Vasomotor nephropathy IVF resuscitation therapy, bmp, monitor uop q shift.





DVT prophylaxis


Current Visit: Yes   Status: Acute   


SCD to BLE while in bed, Heparin








The high probability of a clinically significant, sudden or life threatening 

deterioration of the [Endocrine, renal,  respiratory] system(s) required my full

and direct attention, intervention and personal management. The aggregate 

critical care time was [35] minutes. This time is in addition to time spent 

performing 


reported procedures but includes the following: 





[x] Data Review and interpretation 





[x] Patient assessment and monitoring of vital signs 





[x] Documentation 





[x] Medication orders and management

## 2019-11-28 LAB
ALBUMIN SERPL-MCNC: 3.4 G/DL (ref 3.9–5)
ALT SERPL-CCNC: 11 UNITS/L (ref 7–56)
BAND NEUTROPHILS # (MANUAL): 4.1 K/MM3
BUN SERPL-MCNC: 7 MG/DL (ref 9–20)
BUN/CREAT SERPL: 5 %
BUN/CREAT SERPL: 5 %
BUN/CREAT SERPL: 6 %
CALCIUM SERPL-MCNC: 7.9 MG/DL (ref 8.4–10.2)
CALCIUM SERPL-MCNC: 7.9 MG/DL (ref 8.4–10.2)
CALCIUM SERPL-MCNC: 8.3 MG/DL (ref 8.4–10.2)
CALCIUM SERPL-MCNC: 8.6 MG/DL (ref 8.4–10.2)
CALCIUM SERPL-MCNC: 9.1 MG/DL (ref 8.4–10.2)
HCT VFR BLD CALC: 41.5 % (ref 35.5–45.6)
HEMOLYSIS INDEX: 10
HEMOLYSIS INDEX: 2
HEMOLYSIS INDEX: 4
HEMOLYSIS INDEX: 4
HEMOLYSIS INDEX: 7
HGB BLD-MCNC: 13.2 GM/DL (ref 11.8–15.2)
MCHC RBC AUTO-ENTMCNC: 32 % (ref 32–34)
MCV RBC AUTO: 89 FL (ref 84–94)
MYELOCYTES # (MANUAL): 0 K/MM3
PLATELET # BLD: 237 K/MM3 (ref 140–440)
PROMYELOCYTES # (MANUAL): 0 K/MM3
RBC # BLD AUTO: 4.66 M/MM3 (ref 3.65–5.03)
TOTAL CELLS COUNTED BLD: 100
TOXIC GRANULES BLD QL SMEAR: (no result)

## 2019-11-28 PROCEDURE — 4A033R1 MEASUREMENT OF ARTERIAL SATURATION, PERIPHERAL, PERCUTANEOUS APPROACH: ICD-10-PCS | Performed by: HOSPITALIST

## 2019-11-28 PROCEDURE — 5A09357 ASSISTANCE WITH RESPIRATORY VENTILATION, LESS THAN 24 CONSECUTIVE HOURS, CONTINUOUS POSITIVE AIRWAY PRESSURE: ICD-10-PCS | Performed by: HOSPITALIST

## 2019-11-28 RX ADMIN — METOPROLOL TARTRATE SCH MG: 25 TABLET, FILM COATED ORAL at 21:35

## 2019-11-28 RX ADMIN — Medication SCH ML: at 08:59

## 2019-11-28 RX ADMIN — AZITHROMYCIN SCH MLS/HR: 500 INJECTION, POWDER, LYOPHILIZED, FOR SOLUTION INTRAVENOUS at 19:38

## 2019-11-28 RX ADMIN — FAMOTIDINE SCH MG: 10 INJECTION, SOLUTION INTRAVENOUS at 21:37

## 2019-11-28 RX ADMIN — SODIUM BICARBONATE SCH MLS/HR: 84 INJECTION, SOLUTION INTRAVENOUS at 03:41

## 2019-11-28 RX ADMIN — INSULIN LISPRO SCH UNIT: 100 INJECTION, SOLUTION INTRAVENOUS; SUBCUTANEOUS at 11:53

## 2019-11-28 RX ADMIN — CEFTRIAXONE SODIUM SCH MLS/HR: 1 INJECTION, POWDER, FOR SOLUTION INTRAMUSCULAR; INTRAVENOUS at 11:48

## 2019-11-28 RX ADMIN — INSULIN LISPRO SCH UNIT: 100 INJECTION, SOLUTION INTRAVENOUS; SUBCUTANEOUS at 08:55

## 2019-11-28 RX ADMIN — ENOXAPARIN SODIUM SCH MG: 100 INJECTION SUBCUTANEOUS at 21:36

## 2019-11-28 RX ADMIN — FAMOTIDINE SCH MG: 10 INJECTION, SOLUTION INTRAVENOUS at 08:59

## 2019-11-28 RX ADMIN — HEPARIN SODIUM SCH UNIT: 5000 INJECTION, SOLUTION INTRAVENOUS; SUBCUTANEOUS at 09:03

## 2019-11-28 RX ADMIN — Medication SCH ML: at 21:37

## 2019-11-28 NOTE — XRAY REPORT
CHEST 1 VIEW 



INDICATION: 

respiratory distress.



COMPARISON: 

2 days prior



FINDINGS:

Support devices: None.



Heart: Within normal limits. 

Lungs/Pleura: New moderate interstitial edema. No significant effusion. 



Additional findings: None.



IMPRESSION:

1. New moderate interstitial edema.



Signer Name: Richard Parks MD 

Signed: 11/28/2019 12:09 PM

 Workstation Name: [id number]

## 2019-11-28 NOTE — CAT SCAN REPORT
CTA chest with contrast



INDICATION : SOB/elevated d dimers/r/o PE.



TECHNIQUE:  Axial imaging performed through the chest, with contrast bolus timing set to maximize opa
cification of the pulmonary arteries. 3-plane MIP reformatted images were obtained.  All CT scans at 
this location are performed using CT dose reduction for ALARA by means of automated exposure control.
 



100 mL of intravenous contrast administered.



COMPARISON:  None



FINDINGS:  



Bolus:  Contrast bolus timing is adequate.

PTE:  No filling defect is present to suggest PTE.

Mediastinum:  Heart and great vessels appear normal.  No pathologic mediastinal adenopathy.

Lungs:  Severe interstitial and alveolar edema with small effusions.



Upper abdomen:  Limited imaging of the upper abdomen shows nothing acute.

Bones:  No acute abnormality.



IMPRESSION: 

1. Negative for PTE.

2. Severe interstitial and alveolar edema with small bilateral effusions.



Signer Name: Richard Parks MD 

Signed: 11/28/2019 5:07 PM

 Workstation Name: [id number]

## 2019-11-28 NOTE — PROGRESS NOTE
Assessment and Plan


Assessment and plan: 


--DKA (diabetic ketoacidoses)


Current Visit: No   Status: Acute   


s/p DKA pathway 


IVF resuscitation therapy, serial BMP,


Insulin,encourage fluids 





--Type I DM:


uncontrolled,Accu check,SSC


70/30 insulin ,ADA diet





--Severe dehydration;


Vigorous IV hydration





--Sepsis


Current Visit: Yes   Status: Acute   


Initiate Sepsis Protocol: IV antibiotic therapy, 


IVF resuscitation therapy, blood cultures, 





--Hypokalemia;


 replenish per protocol and monitor levels





--Severe Metabolic Acidosis


Current Visit: Yes   Status: Acute  


Aggressive IV hydration, 


IV sodium bicarbonate supportive care 





-- ARIANNA (acute kidney injury)


Current Visit: Yes   Status: Acute   


Vasomotor nephropathy IVF resuscitation therapy, bmp, monitor uop q shift.





-- DVT prophylaxis


Current Visit: Yes   Status: Acute   


SCD to BLE while in bed, prophylactic heparin








The high probability of a clinically significant, sudden or life threatening 

deterioration of the [Neuro, renal,  


respiratory] system(s) required my full and direct attention, intervention and 

personal management. 


The aggregate critical care time was [35] minutes. This time is in addition to 

time spent performing 


reported procedures but includes the following: 





[x] Data Review and interpretation 





[x] Patient assessment and monitoring of vital signs 





[x] Documentation 





[x] Medication orders and management














History


Interval history: 


Patient seen and examined medical records reviewed


Admitted with DKA ,transferred to floor last night


on IV fluids and long acting Insulin.


Patient wants to go home.


Severe acidosis, on bicarb drip


Vital signs noted








Hospitalist Physical





- Constitutional


Vitals: 


                                        











Temp Pulse Resp BP Pulse Ox


 


 99.3 F   120 H  22   122/80   92 


 


 11/28/19 05:42  11/28/19 05:42  11/28/19 05:42  11/28/19 05:42  11/28/19 05:42











General appearance: Present: no acute distress, well-nourished, other ( 

dehydrated )





- EENT


Eyes: Present: PERRL, EOM intact





- Neck


Neck: Present: supple, normal ROM





- Respiratory


Respiratory effort: normal


Respiratory: bilateral: diminished, negative: rales, rhonchi, wheezing





- Cardiovascular


Rhythm: regular


Heart Sounds: Present: S1 & S2





- Extremities


Extremities: no ischemia, No edema





- Abdominal


General gastrointestinal: soft, non-tender, non-distended, normal bowel sounds





- Integumentary


Integumentary: Present: clear, warm





- Psychiatric


Psychiatric: appropriate mood/affect, cooperative





- Neurologic


Neurologic: CNII-XII intact, moves all extremities





Results





- Labs


CBC & Chem 7: 


                                 11/26/19 20:14





                                 11/28/19 05:00


Labs: 


                             Laboratory Last Values











WBC  24.8 K/mm3 (4.5-11.0)  H  11/26/19  20:14    


 


RBC  4.98 M/mm3 (3.65-5.03)   11/26/19  20:14    


 


Hgb  14.1 gm/dl (11.8-15.2)   11/26/19  20:14    


 


Hct  45.3 % (35.5-45.6)   11/26/19  20:14    


 


MCV  91 fl (84-94)   11/26/19  20:14    


 


MCH  28 pg (28-32)   11/26/19  20:14    


 


MCHC  31 % (32-34)  L  11/26/19  20:14    


 


RDW  14.5 % (13.2-15.2)   11/26/19  20:14    


 


Plt Count  347 K/mm3 (140-440)   11/26/19  20:14    


 


Add Manual Diff  Complete   11/26/19  20:14    


 


Total Counted  100   11/26/19  20:14    


 


Seg Neuts % (Manual)  89.0 % (40.0-70.0)  H  11/26/19  20:14    


 


Band Neutrophils %  0 %  11/26/19  20:14    


 


Lymphocytes % (Manual)  7.0 % (13.4-35.0)  L  11/26/19  20:14    


 


Reactive Lymphs % (Man)  0 %  11/26/19  20:14    


 


Monocytes % (Manual)  4.0 % (0.0-7.3)   11/26/19  20:14    


 


Eosinophils % (Manual)  0 % (0.0-4.3)   11/26/19  20:14    


 


Basophils % (Manual)  0 % (0.0-1.8)   11/26/19  20:14    


 


Metamyelocytes %  0 %  11/26/19  20:14    


 


Myelocytes %  0 %  11/26/19  20:14    


 


Promyelocytes %  0 %  11/26/19  20:14    


 


Blast Cells %  0 %  11/26/19  20:14    


 


Nucleated RBC %  Not Reportable   11/26/19  20:14    


 


Seg Neutrophils # Man  22.1 K/mm3 (1.8-7.7)  H  11/26/19  20:14    


 


Band Neutrophils #  0.0 K/mm3  11/26/19  20:14    


 


Lymphocytes # (Manual)  1.7 K/mm3 (1.2-5.4)   11/26/19  20:14    


 


Abs React Lymphs (Man)  0.0 K/mm3  11/26/19  20:14    


 


Monocytes # (Manual)  1.0 K/mm3 (0.0-0.8)  H  11/26/19  20:14    


 


Eosinophils # (Manual)  0.0 K/mm3 (0.0-0.4)   11/26/19  20:14    


 


Basophils # (Manual)  0.0 K/mm3 (0.0-0.1)   11/26/19  20:14    


 


Metamyelocytes #  0.0 K/mm3  11/26/19  20:14    


 


Myelocytes #  0.0 K/mm3  11/26/19  20:14    


 


Promyelocytes #  0.0 K/mm3  11/26/19  20:14    


 


Blast Cells #  0.0 K/mm3  11/26/19  20:14    


 


WBC Morphology  Not Reportable   11/26/19  20:14    


 


Hypersegmented Neuts  Not Reportable   11/26/19  20:14    


 


Hyposegmented Neuts  Not Reportable   11/26/19  20:14    


 


Hypogranular Neuts  Not Reportable   11/26/19  20:14    


 


Smudge Cells  Not Reportable   11/26/19  20:14    


 


Toxic Granulation  Not Reportable   11/26/19  20:14    


 


Toxic Vacuolation  Not Reportable   11/26/19  20:14    


 


Dohle Bodies  Not Reportable   11/26/19  20:14    


 


Pelger-Huet Anomaly  Not Reportable   11/26/19  20:14    


 


Jose Rods  Not Reportable   11/26/19  20:14    


 


Platelet Estimate  Not Reportable   11/26/19  20:14    


 


Clumped Platelets  Not Reportable   11/26/19  20:14    


 


Plt Clumps, EDTA  Not Reportable   11/26/19  20:14    


 


Large Platelets  Not Reportable   11/26/19  20:14    


 


Giant Platelets  Not Reportable   11/26/19  20:14    


 


Platelet Satelliting  Not Reportable   11/26/19  20:14    


 


Plt Morphology Comment  Not Reportable   11/26/19  20:14    


 


RBC Morphology  Normal   11/26/19  20:14    


 


Dimorphic RBCs  Not Reportable   11/26/19  20:14    


 


Polychromasia  Not Reportable   11/26/19  20:14    


 


Hypochromasia  Not Reportable   11/26/19  20:14    


 


Poikilocytosis  Not Reportable   11/26/19  20:14    


 


Anisocytosis  Not Reportable   11/26/19  20:14    


 


Microcytosis  Not Reportable   11/26/19  20:14    


 


Macrocytosis  Not Reportable   11/26/19  20:14    


 


Spherocytes  Not Reportable   11/26/19  20:14    


 


Pappenheimer Bodies  Not Reportable   11/26/19  20:14    


 


Sickle Cells  Not Reportable   11/26/19  20:14    


 


Target Cells  Not Reportable   11/26/19  20:14    


 


Tear Drop Cells  Not Reportable   11/26/19  20:14    


 


Ovalocytes  Not Reportable   11/26/19  20:14    


 


Helmet Cells  Not Reportable   11/26/19  20:14    


 


Al-Woodridge Bodies  Not Reportable   11/26/19  20:14    


 


Cabot Rings  Not Reportable   11/26/19  20:14    


 


Pioneer Cells  Not Reportable   11/26/19  20:14    


 


Bite Cells  Not Reportable   11/26/19  20:14    


 


Crenated Cell  Not Reportable   11/26/19  20:14    


 


Elliptocytes  Not Reportable   11/26/19  20:14    


 


Acanthocytes (Spur)  Not Reportable   11/26/19  20:14    


 


Rouleaux  Not Reportable   11/26/19  20:14    


 


Hemoglobin C Crystals  Not Reportable   11/26/19  20:14    


 


Schistocytes  Not Reportable   11/26/19  20:14    


 


Malaria parasites  Not Reportable   11/26/19  20:14    


 


Davide Bodies  Not Reportable   11/26/19  20:14    


 


Hem Pathologist Commnt  No   11/26/19  20:14    


 


VBG pH  6.993  (7.320-7.420)  L*  11/26/19  18:43    


 


Sodium  145 mmol/L (137-145)   11/28/19  05:00    


 


Potassium  3.3 mmol/L (3.6-5.0)  L  11/28/19  05:00    


 


Chloride  111.1 mmol/L ()  H  11/28/19  05:00    


 


Carbon Dioxide  7 mmol/L (22-30)  L*  11/28/19  05:00    


 


Anion Gap  30 mmol/L  11/28/19  05:00    


 


BUN  7 mg/dL (9-20)  L  11/28/19  05:00    


 


Creatinine  1.1 mg/dL (0.8-1.5)   11/28/19  05:00    


 


Estimated GFR  > 60 ml/min  11/28/19  05:00    


 


BUN/Creatinine Ratio  6 %  11/28/19  05:00    


 


Glucose  290 mg/dL ()  H  11/28/19  05:00    


 


POC Glucose  390  ()  H  11/28/19  11:21    


 


Lactic Acid  1.40 mmol/L (0.7-2.0)   11/27/19  04:13    


 


Calcium  9.1 mg/dL (8.4-10.2)   11/28/19  05:00    


 


Phosphorus  1.30 mg/dL (2.5-4.5)  L D 11/28/19  05:00    


 


Magnesium  1.90 mg/dL (1.7-2.3)   11/28/19  05:00    


 


Total Bilirubin  < 0.20 mg/dL (0.1-1.2)   11/26/19  18:43    


 


Direct Bilirubin  < 0.2 mg/dL (0-0.2)   11/26/19  18:43    


 


Indirect Bilirubin  0.0 mg/dL  11/26/19  18:43    


 


AST  11 units/L (5-40)   11/26/19  18:43    


 


ALT  18 units/L (7-56)   11/26/19  18:43    


 


Alkaline Phosphatase  166 units/L ()  H  11/26/19  18:43    


 


Total Protein  8.0 g/dL (6.3-8.2)   11/26/19  18:43    


 


Albumin  4.5 g/dL (3.9-5)   11/26/19  18:43    


 


Albumin/Globulin Ratio  1.3 %  11/26/19  18:43    


 


Lipase  64 units/L (13-60)  H  11/26/19  18:43    


 


Urine Color  Straw  (Yellow)   11/26/19  21:29    


 


Urine Turbidity  Clear  (Clear)   11/26/19  21:29    


 


Urine pH  5.0  (5.0-7.0)   11/26/19  21:29    


 


Ur Specific Gravity  1.013  (1.003-1.030)   11/26/19  21:29    


 


Urine Protein  100 mg/dl mg/dL (Negative)   11/26/19  21:29    


 


Urine Glucose (UA)  >=500 mg/dL (Negative)   11/26/19  21:29    


 


Urine Ketones  80 mg/dL (Negative)   11/26/19  21:29    


 


Urine Blood  Sm  (Negative)   11/26/19  21:29    


 


Urine Nitrite  Neg  (Negative)   11/26/19  21:29    


 


Urine Bilirubin  Neg  (Negative)   11/26/19  21:29    


 


Urine Urobilinogen  < 2.0 mg/dL (<2.0)   11/26/19  21:29    


 


Ur Leukocyte Esterase  Neg  (Negative)   11/26/19  21:29    


 


Urine WBC (Auto)  1.0 /HPF (0.0-6.0)   11/26/19  21:29    


 


Urine RBC (Auto)  3.0 /HPF (0.0-6.0)   11/26/19  21:29    


 


U Epithel Cells (Auto)  1.0 /HPF (0-13.0)   11/26/19  21:29    


 


Urine Bacteria (Auto)  1+ /HPF (Negative)   11/26/19  21:29    


 


Urine Mucus  Few /HPF  11/26/19  21:29    














Active Medications





- Current Medications


Current Medications: 














Generic Name Dose Route Start Last Admin





  Trade Name Freq  PRN Reason Stop Dose Admin


 


Al Hydrox/Mg Hydrox/Simethicone  15 ml  11/27/19 19:03 





  Alum-Mag Hydrox-Simeth 338-061-72io/5ml  PO  





  Q4H PRN  





  Indigestion  


 


Dextrose  0 ml  11/26/19 17:49 





  D50w (25gm) Syringe  IV  





  Q30MIN PRN  





  Hypoglycemia  





  Protocol  


 


Famotidine  20 mg  11/27/19 22:00  11/28/19 08:59





  Pepcid  IV   20 mg





  BID SAI   Administration


 


Furosemide  40 mg  11/28/19 12:00  11/28/19 11:33





  Lasix  IV  11/28/19 12:01  40 mg





  ONCE ONE   Administration


 


Heparin Sodium (Porcine)  5,000 unit  11/26/19 22:00  11/28/19 09:03





  Heparin  SUB-Q   5,000 unit





  Q12HR SAI   Administration


 


Ceftriaxone Sodium  1 gm in 50 mls @ 100 mls/hr  11/27/19 10:00  11/27/19 10:34





  Rocephin/Ns 1 Gm/50 Ml  IV  11/29/19 10:29  100 mls/hr





  Q24HR SAI   Administration





  Protocol  


 


Sodium Bicarbonate 50 meq/  1,060 mls @ 125 mls/hr  11/27/19 14:00  11/28/19 

03:41





Potassium Chloride 20 meq/  IV   125 mls/hr





Dextrose  AS DIRECT SAI   Administration


 


Sodium Chloride  1,000 mls @ 125 mls/hr  11/27/19 20:00 





  Nacl 0.9% 1000 Ml  IV  





  AS DIRECT SAI  


 


Potassium Phosphate 40 mmol/  513.3333 mls @ 83 mls/hr  11/28/19 08:30  11/28/19

 08:54





  Sodium Chloride  IV  11/28/19 14:41  83 mls/hr





  ONCE ONE   Administration


 


Insulin Human Isoph/Insulin Regular  20 unit  11/28/19 08:00  11/28/19 09:04





  Humulin 70/30  SUB-Q   20 unit





  BIDDIAB SAI   Administration


 


Insulin Human Lispro  0 unit  11/27/19 22:00  11/28/19 08:55





  Humalog  SUB-Q   8 unit





  ACHS SAI   Administration





  Protocol  


 


Ondansetron HCl  4 mg  11/27/19 19:03  11/27/19 21:20





  Zofran  IV   4 mg





  Q4H PRN   Administration





  Nausea And Vomiting  


 


Sodium Chloride  10 ml  11/26/19 22:00  11/28/19 08:59





  Sodium Chloride Flush Syringe 10 Ml  IV   10 ml





  BID SAI   Administration


 


Sodium Chloride  10 ml  11/26/19 20:04 





  Sodium Chloride Flush Syringe 10 Ml  IV  





  PRN PRN  





  LINE FLUSH  


 


Zolpidem Tartrate  5 mg  11/27/19 19:05 





  Ambien  PO  





  QHS PRN  





  Sleep

## 2019-11-28 NOTE — EVENT NOTE
Date: 11/28/19


Patient was in acute hypoxic respiratory failure with o2 sat in 70s


Code met was called,Resp therapist started on 100% non rebreather and 


later on Bipap with improvement.Patient severly acdotic on bicarb.


Received fluid bolus for met acidosis this morning,Lasix 40mg IV stat.


Transfer to ICU for close observation.


Set of labs,stat Xray,EKG and set of labs.


Plan of care reviewed with the patient and his nurse.





Addendum;


Patient's workup is consistent with 





-DKA


- start DKA protocol with insulin drip/IV fluids





-Elevated D dimers; stat CTA chest, rule out PE





-Chest x-ray mild condition; dose of IV Lasix





Monitor closely and adjust management as needed





Critical care time 35 minutes


Plan of care reviewed with the patient's nurse

## 2019-11-28 NOTE — PROGRESS NOTE
Assessment and Plan








Acute Hypoxemic Respiratory Failure


Bilateral Pulmonary infiltrates (suspect Pulm edema over Pneumonia)


DKA (diabetic ketoacidosis)


severe metabolic acidosis


Severe dehydration


Sepsis


Hypokalemia


Severe Metabolic Acidosis


ARIANNA (acute kidney injury)





- resume DKA protocol


- deploy continuous BIPAP to reduce work of breathing and for alveolar 

recruitment


- watch closely for N&V


- intubation if fails BIPAP


- bicarbonate supplementation acutely 


- hold on diuretics re: DKA


- sputum C&S


- continue empiric Rocephin


- add empiric Zithromax for CAP coverage


- get Procalcitonin and lactate levels to aid clinical decision making


- prn ABG's


- consider ID consultation re: Leucocytosis also


- conservative volume management


- quick ACS w/up with cardiac enzymes, EKG & 2D ECHO


- continue GI & VTE prophylaxis with Famotidine and Enoxaparin


- continue other care per attending / other consuiltants


- transferred to ICU








The high probability of a clinically significant, sudden or life threatening 

deterioration of the [Endocrine, renal,  respiratory] system(s) required my full

and direct attention, intervention and personal management. The aggregate 

critical care time was [35] minutes. This time is in addition to time spent p

erforming 


reported procedures but includes the following: 





[x] Data Review and interpretation 





[x] Patient assessment and monitoring of vital signs 





[x] Documentation 





[x] Medication orders and management








Subjective


Date of service: 11/28/19


Principal diagnosis: Ac Hypoxemic Resp Failure; Jesus. Pulm infiltrates; DKA; 

Sepsis


Interval history: 





Patient is seen today for: Acute Hypoxemic Respiratory Failure; Bilateral 

Pulmonary infiltrates (suspect Pulm edema over Pneumonia); DKA (diabetic 

ketoacidosis); severe metabolic acidosis


Sepsis Syndrome





Seen and examined at bedside; 24-hour events reviewed; nursing and respiratory 

care staff consulted; no adverse overnight events reported to me; resting in bed

 with significantly increased work of breathing; apparently became acutely 

hypoxemic after volume bolus; No reported emesis or overt aspiration; ABG & BMP 

consistent with DKA

















Objective


                               Vital Signs - 12hr











  11/27/19 11/27/19 11/27/19





  22:00 23:00 23:43


 


Temperature   98.7 F


 


Pulse Rate 120 H  106 H


 


Pulse Rate [  116 H 





Left Dorsalis   





Pedis]   


 


Pulse Rate [  116 H 





Left Radial]   


 


Pulse Rate [  116 H 





Right Dorsalis   





Pedis]   


 


Pulse Rate [  116 H 





Right Radial]   


 


Respiratory  22 18





Rate   


 


Blood Pressure   124/77


 


O2 Sat by Pulse  100 93





Oximetry   














  11/28/19





  05:42


 


Temperature 99.3 F


 


Pulse Rate 120 H


 


Pulse Rate [ 





Left Dorsalis 





Pedis] 


 


Pulse Rate [ 





Left Radial] 


 


Pulse Rate [ 





Right Dorsalis 





Pedis] 


 


Pulse Rate [ 





Right Radial] 


 


Respiratory 22





Rate 


 


Blood Pressure 122/80


 


O2 Sat by Pulse 92





Oximetry 











Constitutional: appears uncomfortable, other (Young male, normocephalic with 

moderately increased respiratory effort at rest)


Eyes: non-icteric


ENT: oropharynx moist, other (Mallampati 2)


Neck: supple, no JVD


Effort: very labored


Ascultation: Bilateral: diminished breath sounds, rales


Percussion: Bilateral: not dull


Cardiovascular: regular rate and rhythm, other (S1,S2, no murmurs)


Gastrointestinal: normoactive bowel sounds, soft, non-tender, non-distended


Integumentary: normal


Extremities: no cyanosis, no edema, pulses normal, no ischemia or petechiae


Neurologic: normal mental status, non-focal exam, pupils equal and round, CN II-

XII normal, motor strength normal and


Psychiatric: mood appropriate, anxious


CBC and BMP: 


                                 11/28/19 11:54





                                 11/28/19 11:47


Abnormal lab findings: 


                                  Abnormal Labs











  11/26/19 11/26/19 11/26/19





  18:43 18:43 18:43


 


WBC   


 


MCHC   


 


Seg Neuts % (Manual)   


 


Lymphocytes % (Manual)   


 


Seg Neutrophils # Man   


 


Monocytes # (Manual)   


 


VBG pH   


 


Sodium   134 L 


 


Potassium   


 


Chloride   92.4 L 


 


Carbon Dioxide   3 L* 


 


BUN   


 


Creatinine   1.6 H 


 


Glucose   581 H* 


 


POC Glucose   


 


Lactic Acid   


 


Calcium   


 


Phosphorus  5.50 H  


 


Alkaline Phosphatase    166 H


 


Lipase   














  11/26/19 11/26/19 11/26/19





  18:43 18:43 19:04


 


WBC   


 


MCHC   


 


Seg Neuts % (Manual)   


 


Lymphocytes % (Manual)   


 


Seg Neutrophils # Man   


 


Monocytes # (Manual)   


 


VBG pH  6.993 L*  


 


Sodium   


 


Potassium   


 


Chloride   


 


Carbon Dioxide   


 


BUN   


 


Creatinine   


 


Glucose   


 


POC Glucose    437 H


 


Lactic Acid   


 


Calcium   


 


Phosphorus   


 


Alkaline Phosphatase   


 


Lipase   64 H 














  11/26/19 11/26/19 11/26/19





  19:40 20:14 20:45


 


WBC   24.8 H 


 


MCHC   31 L 


 


Seg Neuts % (Manual)   89.0 H 


 


Lymphocytes % (Manual)   7.0 L 


 


Seg Neutrophils # Man   22.1 H 


 


Monocytes # (Manual)   1.0 H 


 


VBG pH   


 


Sodium  135 L  


 


Potassium   


 


Chloride  94.4 L  


 


Carbon Dioxide  2 L*  


 


BUN   


 


Creatinine  1.6 H  


 


Glucose  536 H*  


 


POC Glucose    411 H


 


Lactic Acid   


 


Calcium   


 


Phosphorus  5.10 H  


 


Alkaline Phosphatase   


 


Lipase   














  11/26/19 11/26/19 11/26/19





  21:25 21:25 21:59


 


WBC   


 


MCHC   


 


Seg Neuts % (Manual)   


 


Lymphocytes % (Manual)   


 


Seg Neutrophils # Man   


 


Monocytes # (Manual)   


 


VBG pH   


 


Sodium   


 


Potassium  3.5 L  


 


Chloride   


 


Carbon Dioxide  5 L*  


 


BUN   


 


Creatinine   


 


Glucose  370 H  


 


POC Glucose    289 H


 


Lactic Acid   2.80 H* 


 


Calcium  8.2 L  


 


Phosphorus   


 


Alkaline Phosphatase   


 


Lipase   














  11/26/19 11/27/19 11/27/19





  23:16 00:32 00:37


 


WBC   


 


MCHC   


 


Seg Neuts % (Manual)   


 


Lymphocytes % (Manual)   


 


Seg Neutrophils # Man   


 


Monocytes # (Manual)   


 


VBG pH   


 


Sodium    148 H


 


Potassium    3.0 L


 


Chloride    116.1 H


 


Carbon Dioxide    8 L*


 


BUN   


 


Creatinine   


 


Glucose    135 H


 


POC Glucose  192 H  127 H 


 


Lactic Acid   


 


Calcium    7.1 L


 


Phosphorus   


 


Alkaline Phosphatase   


 


Lipase   














  11/27/19 11/27/19 11/27/19





  01:17 02:17 03:27


 


WBC   


 


MCHC   


 


Seg Neuts % (Manual)   


 


Lymphocytes % (Manual)   


 


Seg Neutrophils # Man   


 


Monocytes # (Manual)   


 


VBG pH   


 


Sodium   


 


Potassium   


 


Chloride   


 


Carbon Dioxide   


 


BUN   


 


Creatinine   


 


Glucose   


 


POC Glucose  135 H  166 H  254 H


 


Lactic Acid   


 


Calcium   


 


Phosphorus   


 


Alkaline Phosphatase   


 


Lipase   














  11/27/19 11/27/19 11/27/19





  04:13 04:34 05:41


 


WBC   


 


MCHC   


 


Seg Neuts % (Manual)   


 


Lymphocytes % (Manual)   


 


Seg Neutrophils # Man   


 


Monocytes # (Manual)   


 


VBG pH   


 


Sodium  147 H  


 


Potassium  3.3 L  


 


Chloride  113.4 H  


 


Carbon Dioxide  7 L*  


 


BUN   


 


Creatinine   


 


Glucose  320 H  


 


POC Glucose   320 H  333 H


 


Lactic Acid   


 


Calcium  7.5 L  


 


Phosphorus   


 


Alkaline Phosphatase   


 


Lipase   














  11/27/19 11/27/19 11/27/19





  06:20 07:27 07:53


 


WBC   


 


MCHC   


 


Seg Neuts % (Manual)   


 


Lymphocytes % (Manual)   


 


Seg Neutrophils # Man   


 


Monocytes # (Manual)   


 


VBG pH   


 


Sodium   


 


Potassium   


 


Chloride   


 


Carbon Dioxide   


 


BUN   


 


Creatinine   


 


Glucose   


 


POC Glucose  363 H  343 H  319 H


 


Lactic Acid   


 


Calcium   


 


Phosphorus   


 


Alkaline Phosphatase   


 


Lipase   














  11/27/19 11/27/19 11/27/19





  08:46 09:45 10:03


 


WBC   


 


MCHC   


 


Seg Neuts % (Manual)   


 


Lymphocytes % (Manual)   


 


Seg Neutrophils # Man   


 


Monocytes # (Manual)   


 


VBG pH   


 


Sodium    148 H


 


Potassium    3.1 L


 


Chloride    114.1 H


 


Carbon Dioxide    4 L*


 


BUN   


 


Creatinine   


 


Glucose    364 H


 


POC Glucose  402 H  340 H 


 


Lactic Acid   


 


Calcium    8.3 L


 


Phosphorus   


 


Alkaline Phosphatase   


 


Lipase   














  11/27/19 11/27/19 11/27/19





  10:42 11:26 12:53


 


WBC   


 


MCHC   


 


Seg Neuts % (Manual)   


 


Lymphocytes % (Manual)   


 


Seg Neutrophils # Man   


 


Monocytes # (Manual)   


 


VBG pH   


 


Sodium   


 


Potassium   


 


Chloride   


 


Carbon Dioxide   


 


BUN   


 


Creatinine   


 


Glucose   


 


POC Glucose  321 H  241 H  175 H


 


Lactic Acid   


 


Calcium   


 


Phosphorus   


 


Alkaline Phosphatase   


 


Lipase   














  11/27/19 11/27/19 11/27/19





  13:36 13:47 17:36


 


WBC   


 


MCHC   


 


Seg Neuts % (Manual)   


 


Lymphocytes % (Manual)   


 


Seg Neutrophils # Man   


 


Monocytes # (Manual)   


 


VBG pH   


 


Sodium  151 H  


 


Potassium  3.1 L  


 


Chloride  126.5 H  


 


Carbon Dioxide  12 L D  


 


BUN   


 


Creatinine   


 


Glucose  131 H  


 


POC Glucose   120 H  107 H


 


Lactic Acid   


 


Calcium  8.2 L  


 


Phosphorus   


 


Alkaline Phosphatase   


 


Lipase   














  11/27/19 11/27/19 11/27/19





  18:59 20:32 22:05


 


WBC   


 


MCHC   


 


Seg Neuts % (Manual)   


 


Lymphocytes % (Manual)   


 


Seg Neutrophils # Man   


 


Monocytes # (Manual)   


 


VBG pH   


 


Sodium   146 H 


 


Potassium   


 


Chloride   116.4 H 


 


Carbon Dioxide   10 L 


 


BUN   


 


Creatinine   


 


Glucose   267 H 


 


POC Glucose  153 H   336 H


 


Lactic Acid   


 


Calcium   


 


Phosphorus   


 


Alkaline Phosphatase   


 


Lipase   














  11/28/19 11/28/19





  05:00 07:47


 


WBC  


 


MCHC  


 


Seg Neuts % (Manual)  


 


Lymphocytes % (Manual)  


 


Seg Neutrophils # Man  


 


Monocytes # (Manual)  


 


VBG pH  


 


Sodium  


 


Potassium  3.3 L 


 


Chloride  111.1 H 


 


Carbon Dioxide  7 L* 


 


BUN  7 L 


 


Creatinine  


 


Glucose  290 H 


 


POC Glucose   328 H


 


Lactic Acid  


 


Calcium  


 


Phosphorus  1.30 L D 


 


Alkaline Phosphatase  


 


Lipase  











Chest x-ray: image reviewed (acute bilateral pulmonary infiltrates)


CT scan - chest: image reviewed (negative for VTE; bilateral GGO's and 

infiltrates with smal effusions)


Allied health notes reviewed: nursing

## 2019-11-29 LAB
BENZODIAZEPINES SCREEN,URINE: (no result)
BUN SERPL-MCNC: 7 MG/DL (ref 9–20)
BUN SERPL-MCNC: 7 MG/DL (ref 9–20)
BUN/CREAT SERPL: 5 %
BUN/CREAT SERPL: 6 %
CALCIUM SERPL-MCNC: 8 MG/DL (ref 8.4–10.2)
CALCIUM SERPL-MCNC: 8.1 MG/DL (ref 8.4–10.2)
HEMOLYSIS INDEX: 2
HEMOLYSIS INDEX: 24
METHADONE SCREEN,URINE: (no result)
OPIATE SCREEN,URINE: (no result)

## 2019-11-29 PROCEDURE — 02HV33Z INSERTION OF INFUSION DEVICE INTO SUPERIOR VENA CAVA, PERCUTANEOUS APPROACH: ICD-10-PCS | Performed by: HOSPITALIST

## 2019-11-29 PROCEDURE — 5A09357 ASSISTANCE WITH RESPIRATORY VENTILATION, LESS THAN 24 CONSECUTIVE HOURS, CONTINUOUS POSITIVE AIRWAY PRESSURE: ICD-10-PCS | Performed by: HOSPITALIST

## 2019-11-29 RX ADMIN — INSULIN LISPRO SCH UNIT: 100 INJECTION, SOLUTION INTRAVENOUS; SUBCUTANEOUS at 12:00

## 2019-11-29 RX ADMIN — INSULIN LISPRO SCH UNIT: 100 INJECTION, SOLUTION INTRAVENOUS; SUBCUTANEOUS at 17:33

## 2019-11-29 RX ADMIN — AZITHROMYCIN SCH MLS/HR: 500 INJECTION, POWDER, LYOPHILIZED, FOR SOLUTION INTRAVENOUS at 10:38

## 2019-11-29 RX ADMIN — CEFTRIAXONE SODIUM SCH MLS/HR: 1 INJECTION, POWDER, FOR SOLUTION INTRAMUSCULAR; INTRAVENOUS at 10:16

## 2019-11-29 RX ADMIN — METOPROLOL TARTRATE SCH MG: 25 TABLET, FILM COATED ORAL at 10:23

## 2019-11-29 RX ADMIN — POTASSIUM CHLORIDE SCH MEQ: 10 TABLET, EXTENDED RELEASE ORAL at 06:00

## 2019-11-29 RX ADMIN — INSULIN HUMAN SCH UNIT: 100 INJECTION, SUSPENSION SUBCUTANEOUS at 17:32

## 2019-11-29 RX ADMIN — FAMOTIDINE SCH MG: 10 INJECTION, SOLUTION INTRAVENOUS at 22:02

## 2019-11-29 RX ADMIN — POTASSIUM CHLORIDE SCH MLS/HR: 10 INJECTION, SOLUTION INTRAVENOUS at 03:09

## 2019-11-29 RX ADMIN — INSULIN LISPRO SCH: 100 INJECTION, SOLUTION INTRAVENOUS; SUBCUTANEOUS at 22:16

## 2019-11-29 RX ADMIN — POTASSIUM CHLORIDE SCH MEQ: 10 TABLET, EXTENDED RELEASE ORAL at 00:05

## 2019-11-29 RX ADMIN — POTASSIUM CHLORIDE SCH MLS/HR: 10 INJECTION, SOLUTION INTRAVENOUS at 00:05

## 2019-11-29 RX ADMIN — METOPROLOL TARTRATE SCH MG: 25 TABLET, FILM COATED ORAL at 22:02

## 2019-11-29 RX ADMIN — Medication SCH ML: at 10:40

## 2019-11-29 RX ADMIN — Medication SCH ML: at 22:04

## 2019-11-29 RX ADMIN — POTASSIUM CHLORIDE SCH MLS/HR: 10 INJECTION, SOLUTION INTRAVENOUS at 02:04

## 2019-11-29 RX ADMIN — FAMOTIDINE SCH MG: 10 INJECTION, SOLUTION INTRAVENOUS at 10:16

## 2019-11-29 RX ADMIN — POTASSIUM CHLORIDE SCH MEQ: 10 TABLET, EXTENDED RELEASE ORAL at 03:08

## 2019-11-29 RX ADMIN — ENOXAPARIN SODIUM SCH MG: 100 INJECTION SUBCUTANEOUS at 22:03

## 2019-11-29 RX ADMIN — POTASSIUM CHLORIDE SCH MLS/HR: 10 INJECTION, SOLUTION INTRAVENOUS at 01:01

## 2019-11-29 NOTE — VASCULAR LAB REPORT
DUPLEX DOPPLER BILATERAL LOWER EXTREMITY VEINS



INDICATION:

Elevated d-dimer



FINDINGS:

There is no thrombus within the deep veins of either lower extremity from the common femoral to the c
chun veins. There is normal compression and augmentation on spectral analysis.



IMPRESSION:

No sonographic evidence for DVT in either lower extremity.



Signer Name: Florencio Rojo MD 

Signed: 11/29/2019 12:03 PM

 Workstation Name: [email or web address]

## 2019-11-29 NOTE — PROGRESS NOTE
Assessment and Plan








Acute Hypoxemic Respiratory Failure


Bilateral Pulmonary infiltrates (suspect Pulm edema over Pneumonia)


DKA (diabetic ketoacidosis)


severe metabolic acidosis


Severe dehydration


Sepsis


Hypokalemia


Severe Metabolic Acidosis


ARIANNA (acute kidney injury)





- continue  DKA protocol


- keep on continuous BIPAP to reduce work of breathing and for alveolar 

recruitment; attempt to give daytime breaks from tomorrow


- continue to watch closely for N&V


- reduce bicarbonate drip rate as pH improved (stop shortly)


- continue hold on diuretics for now


- follow sputum C&S


- continue empiric Rocephin


- added empiric Zithromax for CAP coverage


- get Procalcitonin and lactate levels to aid clinical decision making


- prn ABG's


- consider ID consultation re: Leucocytosis also


- conservative volume management


- quick ACS w/up with cardiac enzymes, EKG & 2D ECHO


- continue GI & VTE prophylaxis with Famotidine and Enoxaparin


- continue other care per attending / other consuiltants


- transferred to ICU








The high probability of a clinically significant, sudden or life threatening 

deterioration of the [Endocrine, renal,  respiratory] system(s) required my full

and direct attention, intervention and personal management. The aggregate cri

tical care time was [32] minutes. This time is in addition to time spent 

performing 


reported procedures but includes the following: 





[x] Data Review and interpretation 





[x] Patient assessment and monitoring of vital signs 





[x] Documentation 





[x] Medication orders and management











Subjective


Date of service: 11/29/19


Principal diagnosis: Ac Hypoxemic Resp Failure; Jesus. Pulm infiltrates; DKA; 

Sepsis


Interval history: 





Patient is seen today for: Acute Hypoxemic Respiratory Failure; Bilateral 

Pulmonary infiltrates (suspect Pulm edema over Pneumonia); DKA (diabetic 

ketoacidosis); severe metabolic acidosis


Sepsis Syndrome





Seen and examined at bedside; 24-hour events reviewed; nursing and respiratory 

care staff consulted; no adverse overnight events reported to me; resting in 

bed; remains on BIPAP but feels better; remains on IV insulin; about to get a 

PICC line placed; No N/V/F/C and wwork of breathing improved.





Objective


                               Vital Signs - 12hr











  11/29/19 11/29/19 11/29/19





  01:00 02:00 03:00


 


Temperature   


 


Pulse Rate 119 H 124 H 124 H


 


Respiratory 25 H 22 23





Rate   


 


Blood Pressure 114/85 102/73 114/88


 


O2 Sat by Pulse 92 89 92





Oximetry   














  11/29/19 11/29/19 11/29/19





  03:58 04:00 05:00


 


Temperature  99.1 F 


 


Pulse Rate 121 H 121 H 127 H


 


Respiratory 26 H 26 H 25 H





Rate   


 


Blood Pressure 114/88 94/64 114/88


 


O2 Sat by Pulse 90 86 98





Oximetry   














  11/29/19 11/29/19 11/29/19





  06:00 07:55 08:00


 


Temperature   98.8 F


 


Pulse Rate 120 H 114 H 


 


Respiratory 26 H 22 





Rate   


 


Blood Pressure 111/73 100/79 


 


O2 Sat by Pulse 96 99 





Oximetry   














  11/29/19





  10:23


 


Temperature 


 


Pulse Rate 119 H


 


Respiratory 





Rate 


 


Blood Pressure 82/52


 


O2 Sat by Pulse 





Oximetry 











Constitutional: appears uncomfortable, other (Young male, normocephalic with 

mildly increased respiratory effort at rest)


Eyes: non-icteric


ENT: oropharynx moist, other (Mallampati 2)


Neck: supple, no JVD


Effort: mildly labored


Ascultation: Bilateral: diminished breath sounds, rales


Percussion: Bilateral: not dull


Cardiovascular: regular rate and rhythm, other (S1,S2, no murmurs)


Gastrointestinal: normoactive bowel sounds, soft, non-tender, non-distended


Integumentary: normal


Extremities: no cyanosis, no edema, pulses normal, no ischemia or petechiae


Neurologic: normal mental status, non-focal exam, pupils equal and round, CN II-

XII normal, motor strength normal and


Psychiatric: mood appropriate, anxious


CBC and BMP: 


                                 11/28/19 11:54





                                 11/30/19 06:25


ABG, PT/INR, D-dimer: 


ABG











POC ABG pH  7.453  (7.35-7.45)  H  11/28/19  21:30    


 


POC ABG pCO2  30.0  (35-45)  L  11/28/19  21:30    


 


POC ABG HCO3  21.0  (22-26 mml/L)   11/28/19  21:30    


 


POC ABG Total CO2  22  (23-27mmol/L)   11/28/19  21:30    


 


POC ABG O2 Sat  76   11/28/19  21:30    





PT/INR, D-dimer











D-Dimer  5088.17 ng/mlDDU (0-234)  H  11/28/19  11:47    








Abnormal lab findings: 


                                  Abnormal Labs











  11/26/19 11/26/19 11/26/19





  18:43 18:43 18:43


 


WBC   


 


MCHC   


 


Seg Neuts % (Manual)   


 


Lymphocytes % (Manual)   


 


Seg Neutrophils # Man   


 


Lymphocytes # (Manual)   


 


Monocytes # (Manual)   


 


D-Dimer   


 


POC ABG pH   


 


POC ABG pCO2   


 


POC ABG pO2   


 


VBG pH   


 


Sodium   134 L 


 


Potassium   


 


Chloride   92.4 L 


 


Carbon Dioxide   3 L* 


 


BUN   


 


Creatinine   1.6 H 


 


Glucose   581 H* 


 


POC Glucose   


 


Hemoglobin A1c   


 


Lactic Acid   


 


Calcium   


 


Phosphorus  5.50 H  


 


Magnesium   


 


Alkaline Phosphatase    166 H


 


NT-Pro-B Natriuret Pep   


 


Total Protein   


 


Albumin   


 


Lipase   














  11/26/19 11/26/19 11/26/19





  18:43 18:43 19:04


 


WBC   


 


MCHC   


 


Seg Neuts % (Manual)   


 


Lymphocytes % (Manual)   


 


Seg Neutrophils # Man   


 


Lymphocytes # (Manual)   


 


Monocytes # (Manual)   


 


D-Dimer   


 


POC ABG pH   


 


POC ABG pCO2   


 


POC ABG pO2   


 


VBG pH  6.993 L*  


 


Sodium   


 


Potassium   


 


Chloride   


 


Carbon Dioxide   


 


BUN   


 


Creatinine   


 


Glucose   


 


POC Glucose    437 H


 


Hemoglobin A1c   


 


Lactic Acid   


 


Calcium   


 


Phosphorus   


 


Magnesium   


 


Alkaline Phosphatase   


 


NT-Pro-B Natriuret Pep   


 


Total Protein   


 


Albumin   


 


Lipase   64 H 














  11/26/19 11/26/19 11/26/19





  19:40 20:14 20:45


 


WBC   24.8 H 


 


MCHC   31 L 


 


Seg Neuts % (Manual)   89.0 H 


 


Lymphocytes % (Manual)   7.0 L 


 


Seg Neutrophils # Man   22.1 H 


 


Lymphocytes # (Manual)   


 


Monocytes # (Manual)   1.0 H 


 


D-Dimer   


 


POC ABG pH   


 


POC ABG pCO2   


 


POC ABG pO2   


 


VBG pH   


 


Sodium  135 L  


 


Potassium   


 


Chloride  94.4 L  


 


Carbon Dioxide  2 L*  


 


BUN   


 


Creatinine  1.6 H  


 


Glucose  536 H*  


 


POC Glucose    411 H


 


Hemoglobin A1c   


 


Lactic Acid   


 


Calcium   


 


Phosphorus  5.10 H  


 


Magnesium   


 


Alkaline Phosphatase   


 


NT-Pro-B Natriuret Pep   


 


Total Protein   


 


Albumin   


 


Lipase   














  11/26/19 11/26/19 11/26/19





  21:25 21:25 21:59


 


WBC   


 


MCHC   


 


Seg Neuts % (Manual)   


 


Lymphocytes % (Manual)   


 


Seg Neutrophils # Man   


 


Lymphocytes # (Manual)   


 


Monocytes # (Manual)   


 


D-Dimer   


 


POC ABG pH   


 


POC ABG pCO2   


 


POC ABG pO2   


 


VBG pH   


 


Sodium   


 


Potassium  3.5 L  


 


Chloride   


 


Carbon Dioxide  5 L*  


 


BUN   


 


Creatinine   


 


Glucose  370 H  


 


POC Glucose    289 H


 


Hemoglobin A1c   


 


Lactic Acid   2.80 H* 


 


Calcium  8.2 L  


 


Phosphorus   


 


Magnesium   


 


Alkaline Phosphatase   


 


NT-Pro-B Natriuret Pep   


 


Total Protein   


 


Albumin   


 


Lipase   














  11/26/19 11/27/19 11/27/19





  23:16 00:32 00:37


 


WBC   


 


MCHC   


 


Seg Neuts % (Manual)   


 


Lymphocytes % (Manual)   


 


Seg Neutrophils # Man   


 


Lymphocytes # (Manual)   


 


Monocytes # (Manual)   


 


D-Dimer   


 


POC ABG pH   


 


POC ABG pCO2   


 


POC ABG pO2   


 


VBG pH   


 


Sodium    148 H


 


Potassium    3.0 L


 


Chloride    116.1 H


 


Carbon Dioxide    8 L*


 


BUN   


 


Creatinine   


 


Glucose    135 H


 


POC Glucose  192 H  127 H 


 


Hemoglobin A1c   


 


Lactic Acid   


 


Calcium    7.1 L


 


Phosphorus   


 


Magnesium   


 


Alkaline Phosphatase   


 


NT-Pro-B Natriuret Pep   


 


Total Protein   


 


Albumin   


 


Lipase   














  11/27/19 11/27/19 11/27/19





  01:17 02:17 03:27


 


WBC   


 


MCHC   


 


Seg Neuts % (Manual)   


 


Lymphocytes % (Manual)   


 


Seg Neutrophils # Man   


 


Lymphocytes # (Manual)   


 


Monocytes # (Manual)   


 


D-Dimer   


 


POC ABG pH   


 


POC ABG pCO2   


 


POC ABG pO2   


 


VBG pH   


 


Sodium   


 


Potassium   


 


Chloride   


 


Carbon Dioxide   


 


BUN   


 


Creatinine   


 


Glucose   


 


POC Glucose  135 H  166 H  254 H


 


Hemoglobin A1c   


 


Lactic Acid   


 


Calcium   


 


Phosphorus   


 


Magnesium   


 


Alkaline Phosphatase   


 


NT-Pro-B Natriuret Pep   


 


Total Protein   


 


Albumin   


 


Lipase   














  11/27/19 11/27/19 11/27/19





  04:13 04:34 05:41


 


WBC   


 


MCHC   


 


Seg Neuts % (Manual)   


 


Lymphocytes % (Manual)   


 


Seg Neutrophils # Man   


 


Lymphocytes # (Manual)   


 


Monocytes # (Manual)   


 


D-Dimer   


 


POC ABG pH   


 


POC ABG pCO2   


 


POC ABG pO2   


 


VBG pH   


 


Sodium  147 H  


 


Potassium  3.3 L  


 


Chloride  113.4 H  


 


Carbon Dioxide  7 L*  


 


BUN   


 


Creatinine   


 


Glucose  320 H  


 


POC Glucose   320 H  333 H


 


Hemoglobin A1c   


 


Lactic Acid   


 


Calcium  7.5 L  


 


Phosphorus   


 


Magnesium   


 


Alkaline Phosphatase   


 


NT-Pro-B Natriuret Pep   


 


Total Protein   


 


Albumin   


 


Lipase   














  11/27/19 11/27/19 11/27/19





  06:20 07:27 07:53


 


WBC   


 


MCHC   


 


Seg Neuts % (Manual)   


 


Lymphocytes % (Manual)   


 


Seg Neutrophils # Man   


 


Lymphocytes # (Manual)   


 


Monocytes # (Manual)   


 


D-Dimer   


 


POC ABG pH   


 


POC ABG pCO2   


 


POC ABG pO2   


 


VBG pH   


 


Sodium   


 


Potassium   


 


Chloride   


 


Carbon Dioxide   


 


BUN   


 


Creatinine   


 


Glucose   


 


POC Glucose  363 H  343 H  319 H


 


Hemoglobin A1c   


 


Lactic Acid   


 


Calcium   


 


Phosphorus   


 


Magnesium   


 


Alkaline Phosphatase   


 


NT-Pro-B Natriuret Pep   


 


Total Protein   


 


Albumin   


 


Lipase   














  11/27/19 11/27/19 11/27/19





  08:46 09:45 10:03


 


WBC   


 


MCHC   


 


Seg Neuts % (Manual)   


 


Lymphocytes % (Manual)   


 


Seg Neutrophils # Man   


 


Lymphocytes # (Manual)   


 


Monocytes # (Manual)   


 


D-Dimer   


 


POC ABG pH   


 


POC ABG pCO2   


 


POC ABG pO2   


 


VBG pH   


 


Sodium    148 H


 


Potassium    3.1 L


 


Chloride    114.1 H


 


Carbon Dioxide    4 L*


 


BUN   


 


Creatinine   


 


Glucose    364 H


 


POC Glucose  402 H  340 H 


 


Hemoglobin A1c   


 


Lactic Acid   


 


Calcium    8.3 L


 


Phosphorus   


 


Magnesium   


 


Alkaline Phosphatase   


 


NT-Pro-B Natriuret Pep   


 


Total Protein   


 


Albumin   


 


Lipase   














  11/27/19 11/27/19 11/27/19





  10:42 11:26 12:53


 


WBC   


 


MCHC   


 


Seg Neuts % (Manual)   


 


Lymphocytes % (Manual)   


 


Seg Neutrophils # Man   


 


Lymphocytes # (Manual)   


 


Monocytes # (Manual)   


 


D-Dimer   


 


POC ABG pH   


 


POC ABG pCO2   


 


POC ABG pO2   


 


VBG pH   


 


Sodium   


 


Potassium   


 


Chloride   


 


Carbon Dioxide   


 


BUN   


 


Creatinine   


 


Glucose   


 


POC Glucose  321 H  241 H  175 H


 


Hemoglobin A1c   


 


Lactic Acid   


 


Calcium   


 


Phosphorus   


 


Magnesium   


 


Alkaline Phosphatase   


 


NT-Pro-B Natriuret Pep   


 


Total Protein   


 


Albumin   


 


Lipase   














  11/27/19 11/27/19 11/27/19





  13:36 13:47 17:36


 


WBC   


 


MCHC   


 


Seg Neuts % (Manual)   


 


Lymphocytes % (Manual)   


 


Seg Neutrophils # Man   


 


Lymphocytes # (Manual)   


 


Monocytes # (Manual)   


 


D-Dimer   


 


POC ABG pH   


 


POC ABG pCO2   


 


POC ABG pO2   


 


VBG pH   


 


Sodium  151 H  


 


Potassium  3.1 L  


 


Chloride  126.5 H  


 


Carbon Dioxide  12 L D  


 


BUN   


 


Creatinine   


 


Glucose  131 H  


 


POC Glucose   120 H  107 H


 


Hemoglobin A1c   


 


Lactic Acid   


 


Calcium  8.2 L  


 


Phosphorus   


 


Magnesium   


 


Alkaline Phosphatase   


 


NT-Pro-B Natriuret Pep   


 


Total Protein   


 


Albumin   


 


Lipase   














  11/27/19 11/27/19 11/27/19





  18:59 20:32 22:05


 


WBC   


 


MCHC   


 


Seg Neuts % (Manual)   


 


Lymphocytes % (Manual)   


 


Seg Neutrophils # Man   


 


Lymphocytes # (Manual)   


 


Monocytes # (Manual)   


 


D-Dimer   


 


POC ABG pH   


 


POC ABG pCO2   


 


POC ABG pO2   


 


VBG pH   


 


Sodium   146 H 


 


Potassium   


 


Chloride   116.4 H 


 


Carbon Dioxide   10 L 


 


BUN   


 


Creatinine   


 


Glucose   267 H 


 


POC Glucose  153 H   336 H


 


Hemoglobin A1c   


 


Lactic Acid   


 


Calcium   


 


Phosphorus   


 


Magnesium   


 


Alkaline Phosphatase   


 


NT-Pro-B Natriuret Pep   


 


Total Protein   


 


Albumin   


 


Lipase   














  11/28/19 11/28/19 11/28/19





  05:00 07:47 11:21


 


WBC   


 


MCHC   


 


Seg Neuts % (Manual)   


 


Lymphocytes % (Manual)   


 


Seg Neutrophils # Man   


 


Lymphocytes # (Manual)   


 


Monocytes # (Manual)   


 


D-Dimer   


 


POC ABG pH   


 


POC ABG pCO2   


 


POC ABG pO2   


 


VBG pH   


 


Sodium   


 


Potassium  3.3 L  


 


Chloride  111.1 H  


 


Carbon Dioxide  7 L*  


 


BUN  7 L  


 


Creatinine   


 


Glucose  290 H  


 


POC Glucose   328 H  390 H


 


Hemoglobin A1c   


 


Lactic Acid   


 


Calcium   


 


Phosphorus  1.30 L D  


 


Magnesium   


 


Alkaline Phosphatase   


 


NT-Pro-B Natriuret Pep   


 


Total Protein   


 


Albumin   


 


Lipase   














  11/28/19 11/28/19 11/28/19





  11:47 11:47 11:54


 


WBC   


 


MCHC   


 


Seg Neuts % (Manual)   


 


Lymphocytes % (Manual)   


 


Seg Neutrophils # Man   


 


Lymphocytes # (Manual)   


 


Monocytes # (Manual)   


 


D-Dimer   5088.17 H 


 


POC ABG pH   


 


POC ABG pCO2   


 


POC ABG pO2   


 


VBG pH   


 


Sodium   


 


Potassium   


 


Chloride   


 


Carbon Dioxide  5 L*  


 


BUN  7 L  


 


Creatinine   


 


Glucose   


 


POC Glucose   


 


Hemoglobin A1c    15.1 H


 


Lactic Acid   


 


Calcium   


 


Phosphorus   


 


Magnesium   


 


Alkaline Phosphatase  150 H  


 


NT-Pro-B Natriuret Pep   


 


Total Protein  5.6 L D  


 


Albumin  3.4 L  


 


Lipase   














  11/28/19 11/28/19 11/28/19





  11:54 13:04 14:00


 


WBC  17.7 H  


 


MCHC   


 


Seg Neuts % (Manual)   


 


Lymphocytes % (Manual)  4.0 L  


 


Seg Neutrophils # Man  12.2 H  


 


Lymphocytes # (Manual)  0.7 L  


 


Monocytes # (Manual)   


 


D-Dimer   


 


POC ABG pH   7.076 L 


 


POC ABG pCO2   27.9 L 


 


POC ABG pO2   71 L 


 


VBG pH   


 


Sodium   


 


Potassium   


 


Chloride   


 


Carbon Dioxide   


 


BUN   


 


Creatinine   


 


Glucose   


 


POC Glucose    371 H


 


Hemoglobin A1c   


 


Lactic Acid   


 


Calcium   


 


Phosphorus   


 


Magnesium   


 


Alkaline Phosphatase   


 


NT-Pro-B Natriuret Pep   


 


Total Protein   


 


Albumin   


 


Lipase   














  11/28/19 11/28/19 11/28/19





  15:13 16:09 17:13


 


WBC   


 


MCHC   


 


Seg Neuts % (Manual)   


 


Lymphocytes % (Manual)   


 


Seg Neutrophils # Man   


 


Lymphocytes # (Manual)   


 


Monocytes # (Manual)   


 


D-Dimer   


 


POC ABG pH   


 


POC ABG pCO2   


 


POC ABG pO2   


 


VBG pH   


 


Sodium   


 


Potassium   


 


Chloride   


 


Carbon Dioxide   


 


BUN   


 


Creatinine   


 


Glucose   


 


POC Glucose  259 H  186 H  138 H


 


Hemoglobin A1c   


 


Lactic Acid   


 


Calcium   


 


Phosphorus   


 


Magnesium   


 


Alkaline Phosphatase   


 


NT-Pro-B Natriuret Pep   


 


Total Protein   


 


Albumin   


 


Lipase   














  11/28/19 11/28/19 11/28/19





  18:12 18:43 18:43


 


WBC   


 


MCHC   


 


Seg Neuts % (Manual)   


 


Lymphocytes % (Manual)   


 


Seg Neutrophils # Man   


 


Lymphocytes # (Manual)   


 


Monocytes # (Manual)   


 


D-Dimer   


 


POC ABG pH   


 


POC ABG pCO2   


 


POC ABG pO2   


 


VBG pH   


 


Sodium   


 


Potassium    3.0 L


 


Chloride   


 


Carbon Dioxide    15 L D


 


BUN    7 L


 


Creatinine   


 


Glucose    132 H


 


POC Glucose  121 H  


 


Hemoglobin A1c   


 


Lactic Acid   


 


Calcium    8.3 L


 


Phosphorus   1.70 L D 


 


Magnesium   1.50 L 


 


Alkaline Phosphatase   


 


NT-Pro-B Natriuret Pep   


 


Total Protein   


 


Albumin   


 


Lipase   














  11/28/19 11/28/19 11/28/19





  18:43 18:43 19:20


 


WBC   


 


MCHC   


 


Seg Neuts % (Manual)   


 


Lymphocytes % (Manual)   


 


Seg Neutrophils # Man   


 


Lymphocytes # (Manual)   


 


Monocytes # (Manual)   


 


D-Dimer   


 


POC ABG pH   


 


POC ABG pCO2   


 


POC ABG pO2   


 


VBG pH   


 


Sodium   


 


Potassium   


 


Chloride   


 


Carbon Dioxide   


 


BUN   


 


Creatinine   


 


Glucose   


 


POC Glucose    120 H


 


Hemoglobin A1c   


 


Lactic Acid  4.00 H*  


 


Calcium   


 


Phosphorus   


 


Magnesium   


 


Alkaline Phosphatase   


 


NT-Pro-B Natriuret Pep   1292 H 


 


Total Protein   


 


Albumin   


 


Lipase   














  11/28/19 11/28/19 11/28/19





  20:55 21:30 21:33


 


WBC   


 


MCHC   


 


Seg Neuts % (Manual)   


 


Lymphocytes % (Manual)   


 


Seg Neutrophils # Man   


 


Lymphocytes # (Manual)   


 


Monocytes # (Manual)   


 


D-Dimer   


 


POC ABG pH   7.453 H 


 


POC ABG pCO2  30.5 L  30.0 L 


 


POC ABG pO2   


 


VBG pH   


 


Sodium   


 


Potassium   


 


Chloride   


 


Carbon Dioxide   


 


BUN   


 


Creatinine   


 


Glucose   


 


POC Glucose    121 H


 


Hemoglobin A1c   


 


Lactic Acid   


 


Calcium   


 


Phosphorus   


 


Magnesium   


 


Alkaline Phosphatase   


 


NT-Pro-B Natriuret Pep   


 


Total Protein   


 


Albumin   


 


Lipase   














  11/28/19 11/28/19 11/28/19





  21:59 22:40 22:53


 


WBC   


 


MCHC   


 


Seg Neuts % (Manual)   


 


Lymphocytes % (Manual)   


 


Seg Neutrophils # Man   


 


Lymphocytes # (Manual)   


 


Monocytes # (Manual)   


 


D-Dimer   


 


POC ABG pH   


 


POC ABG pCO2   


 


POC ABG pO2   


 


VBG pH   


 


Sodium   


 


Potassium  3.0 L   2.6 L*


 


Chloride  107.9 H  


 


Carbon Dioxide  19 L   21 L


 


BUN  7 L   7 L


 


Creatinine   


 


Glucose  138 H   148 H


 


POC Glucose   137 H 


 


Hemoglobin A1c   


 


Lactic Acid   


 


Calcium  7.9 L   7.9 L


 


Phosphorus   


 


Magnesium   


 


Alkaline Phosphatase   


 


NT-Pro-B Natriuret Pep   


 


Total Protein   


 


Albumin   


 


Lipase   














  11/28/19 11/29/19 11/29/19





  23:41 02:54 03:51


 


WBC   


 


MCHC   


 


Seg Neuts % (Manual)   


 


Lymphocytes % (Manual)   


 


Seg Neutrophils # Man   


 


Lymphocytes # (Manual)   


 


Monocytes # (Manual)   


 


D-Dimer   


 


POC ABG pH   


 


POC ABG pCO2   


 


POC ABG pO2   


 


VBG pH   


 


Sodium   


 


Potassium   


 


Chloride   


 


Carbon Dioxide   


 


BUN   


 


Creatinine   


 


Glucose   


 


POC Glucose  115 H  143 H  126 H


 


Hemoglobin A1c   


 


Lactic Acid   


 


Calcium   


 


Phosphorus   


 


Magnesium   


 


Alkaline Phosphatase   


 


NT-Pro-B Natriuret Pep   


 


Total Protein   


 


Albumin   


 


Lipase   














  11/29/19 11/29/19 11/29/19





  04:42 05:06 05:44


 


WBC   


 


MCHC   


 


Seg Neuts % (Manual)   


 


Lymphocytes % (Manual)   


 


Seg Neutrophils # Man   


 


Lymphocytes # (Manual)   


 


Monocytes # (Manual)   


 


D-Dimer   


 


POC ABG pH   


 


POC ABG pCO2   


 


POC ABG pO2   


 


VBG pH   


 


Sodium   


 


Potassium   3.0 L 


 


Chloride   108.2 H 


 


Carbon Dioxide   19 L 


 


BUN   7 L 


 


Creatinine   


 


Glucose   173 H 


 


POC Glucose  127 H   163 H


 


Hemoglobin A1c   


 


Lactic Acid   


 


Calcium   8.1 L 


 


Phosphorus   1.40 L 


 


Magnesium   1.50 L 


 


Alkaline Phosphatase   


 


NT-Pro-B Natriuret Pep   


 


Total Protein   


 


Albumin   


 


Lipase   














  11/29/19 11/29/19 11/29/19





  06:41 08:36 09:31


 


WBC   


 


MCHC   


 


Seg Neuts % (Manual)   


 


Lymphocytes % (Manual)   


 


Seg Neutrophils # Man   


 


Lymphocytes # (Manual)   


 


Monocytes # (Manual)   


 


D-Dimer   


 


POC ABG pH   


 


POC ABG pCO2   


 


POC ABG pO2   


 


VBG pH   


 


Sodium   


 


Potassium   


 


Chloride   


 


Carbon Dioxide   


 


BUN   


 


Creatinine   


 


Glucose   


 


POC Glucose  159 H  132 H  148 H


 


Hemoglobin A1c   


 


Lactic Acid   


 


Calcium   


 


Phosphorus   


 


Magnesium   


 


Alkaline Phosphatase   


 


NT-Pro-B Natriuret Pep   


 


Total Protein   


 


Albumin   


 


Lipase   











Chest x-ray: image reviewed (bilateral dense infiltrates)


Allied health notes reviewed: nursing

## 2019-11-29 NOTE — PROGRESS NOTE
Assessment and Plan


Assessment and plan: 


--Acute hypoxic respiratory failure


Requiring BiPAP, titrate O2 sats to more than 90%, nebulizers.  


Management per pulmonary critical


If no improvement intubate as needed





--Hypokalemia;


Replenish per protocol.  


--Hypophosphatemia


Replenish per protocol


--Hypomagnesemia


Replenish per protocol





--DKA (diabetic ketoacidoses)


Current Visit: No   Status: Acute   


s/p DKA pathway ,A gap closed, acidosis improved


Start ADA diet, DC insulin drip


Start long-acting insulin, diabetic education nutrition consult. 





--Type I DM:


uncontrolled,Accu check,SSC


70/30 insulin start low-dose just as needed ,ADA diet





--Severe dehydration;


Vigorous IV hydration





--Sepsis


Current Visit: Yes   Status: Acute   


Initiate Sepsis Protocol: IV antibiotic therapy, 


IVF resuscitation therapy, blood cultures, 





--Severe Metabolic Acidosis


Current Visit: Yes   Status: Acute  


Mild improvement, IV sodium bicarb


IV hydration, closely monitor





-- ARIANNA (acute kidney injury)


Current Visit: Yes   Status: Acute   


Vasomotor nephropathy IVF resuscitation therapy, 


Resolved





-- DVT prophylaxis


Current Visit: Yes   Status: Acute   


Lovenox s/c





Plan of care reviewed with the patient and his nurse





The high probability of a clinically significant, sudden or life threatening 

deterioration of the [Neuro, renal,  


respiratory] system(s) required my full and direct attention, intervention and 

personal management. 


The aggregate critical care time was [35] minutes. This time is in addition to 

time spent performing 


reported procedures but includes the following: 





[x] Data Review and interpretation 





[x] Patient assessment and monitoring of vital signs 





[x] Documentation 





[x] Medication orders and management





DC when medically stable








History


Interval history: 


Patient seen and examined medical records reviewed


Patient was admitted with DKA, on DKA pathway stabilized transferred to the 

floor


Suddenly developed acute hypoxic respiratory failure, elevated D-dimers, CTA 

negative for PE


On BiPAP, blood sugars reasonable level, insulin drip discontinued, started 

long-acting 70/30 insulin


At lower dose, increase as needed





The patient feels slightly better, on BiPAP


Mild distress.  Vital signs reviewed








Hospitalist Physical





- Constitutional


Vitals: 


                                        











Temp Pulse Resp BP Pulse Ox


 


 98.8 F   114 H  22   100/79   99 


 


 11/29/19 08:00  11/29/19 07:55  11/29/19 07:55  11/29/19 07:55  11/29/19 07:55











General appearance: Present: mild distress, cachectic, other (On BiPAP)





- EENT


Eyes: Present: PERRL, EOM intact





- Neck


Neck: Present: supple, normal ROM





- Respiratory


Respiratory effort: normal


Respiratory: bilateral: diminished, rhonchi, negative: rales, wheezing





- Cardiovascular


Rhythm: regular


Heart Sounds: Present: S1 & S2





- Extremities


Extremities: no ischemia, No edema





- Abdominal


General gastrointestinal: soft, non-tender, non-distended, normal bowel sounds





- Integumentary


Integumentary: Present: clear, warm





- Psychiatric


Psychiatric: appropriate mood/affect, cooperative





- Neurologic


Neurologic: CNII-XII intact, moves all extremities





Results





- Labs


CBC & Chem 7: 


                                 11/28/19 11:54





                                 11/29/19 13:28


Labs: 


                             Laboratory Last Values











WBC  17.7 K/mm3 (4.5-11.0)  H  11/28/19  11:54    


 


RBC  4.66 M/mm3 (3.65-5.03)   11/28/19  11:54    


 


Hgb  13.2 gm/dl (11.8-15.2)   11/28/19  11:54    


 


Hct  41.5 % (35.5-45.6)   11/28/19  11:54    


 


MCV  89 fl (84-94)   11/28/19  11:54    


 


MCH  28 pg (28-32)   11/28/19  11:54    


 


MCHC  32 % (32-34)   11/28/19  11:54    


 


RDW  15.0 % (13.2-15.2)   11/28/19  11:54    


 


Plt Count  237 K/mm3 (140-440)   11/28/19  11:54    


 


Add Manual Diff  Complete   11/28/19  11:54    


 


Total Counted  100   11/28/19  11:54    


 


Seg Neutrophils %  Np   11/28/19  11:54    


 


Seg Neuts % (Manual)  69.0 % (40.0-70.0)   11/28/19  11:54    


 


Band Neutrophils %  23.0 %  11/28/19  11:54    


 


Lymphocytes % (Manual)  4.0 % (13.4-35.0)  L  11/28/19  11:54    


 


Reactive Lymphs % (Man)  0 %  11/28/19  11:54    


 


Monocytes % (Manual)  0 % (0.0-7.3)   11/28/19  11:54    


 


Eosinophils % (Manual)  0 % (0.0-4.3)   11/28/19  11:54    


 


Basophils % (Manual)  0 % (0.0-1.8)   11/28/19  11:54    


 


Metamyelocytes %  4.0 %  11/28/19  11:54    


 


Myelocytes %  0 %  11/28/19  11:54    


 


Promyelocytes %  0 %  11/28/19  11:54    


 


Blast Cells %  0 %  11/28/19  11:54    


 


Nucleated RBC %  Not Reportable   11/28/19  11:54    


 


Seg Neutrophils # Man  12.2 K/mm3 (1.8-7.7)  H  11/28/19  11:54    


 


Band Neutrophils #  4.1 K/mm3  11/28/19  11:54    


 


Lymphocytes # (Manual)  0.7 K/mm3 (1.2-5.4)  L  11/28/19  11:54    


 


Abs React Lymphs (Man)  0.0 K/mm3  11/28/19  11:54    


 


Monocytes # (Manual)  0.0 K/mm3 (0.0-0.8)   11/28/19  11:54    


 


Eosinophils # (Manual)  0.0 K/mm3 (0.0-0.4)   11/28/19  11:54    


 


Basophils # (Manual)  0.0 K/mm3 (0.0-0.1)   11/28/19  11:54    


 


Metamyelocytes #  0.7 K/mm3  11/28/19  11:54    


 


Myelocytes #  0.0 K/mm3  11/28/19  11:54    


 


Promyelocytes #  0.0 K/mm3  11/28/19  11:54    


 


Blast Cells #  0.0 K/mm3  11/28/19  11:54    


 


WBC Morphology  Not Reportable   11/28/19  11:54    


 


Hypersegmented Neuts  Not Reportable   11/28/19  11:54    


 


Hyposegmented Neuts  Not Reportable   11/28/19  11:54    


 


Hypogranular Neuts  Not Reportable   11/28/19  11:54    


 


Smudge Cells  Not Reportable   11/28/19  11:54    


 


Toxic Granulation  1+   11/28/19  11:54    


 


Toxic Vacuolation  Few   11/28/19  11:54    


 


Dohle Bodies  Not Reportable   11/28/19  11:54    


 


Pelger-Huet Anomaly  Not Reportable   11/28/19  11:54    


 


Jose Rods  Not Reportable   11/28/19  11:54    


 


Platelet Estimate  Consistent w auto   11/28/19  11:54    


 


Clumped Platelets  Not Reportable   11/28/19  11:54    


 


Plt Clumps, EDTA  Not Reportable   11/28/19  11:54    


 


Large Platelets  Not Reportable   11/28/19  11:54    


 


Giant Platelets  Not Reportable   11/28/19  11:54    


 


Platelet Satelliting  Not Reportable   11/28/19  11:54    


 


Plt Morphology Comment  Not Reportable   11/28/19  11:54    


 


RBC Morphology  Normal   11/28/19  11:54    


 


Dimorphic RBCs  Not Reportable   11/28/19  11:54    


 


Polychromasia  Not Reportable   11/28/19  11:54    


 


Hypochromasia  Not Reportable   11/28/19  11:54    


 


Poikilocytosis  Not Reportable   11/28/19  11:54    


 


Anisocytosis  Not Reportable   11/28/19  11:54    


 


Microcytosis  Not Reportable   11/28/19  11:54    


 


Macrocytosis  Not Reportable   11/28/19  11:54    


 


Spherocytes  Not Reportable   11/28/19  11:54    


 


Pappenheimer Bodies  Not Reportable   11/28/19  11:54    


 


Sickle Cells  Not Reportable   11/28/19  11:54    


 


Target Cells  Not Reportable   11/28/19  11:54    


 


Tear Drop Cells  Not Reportable   11/28/19  11:54    


 


Ovalocytes  Not Reportable   11/28/19  11:54    


 


Helmet Cells  Not Reportable   11/28/19  11:54    


 


[place] Bodies  Not Reportable   11/28/19  11:54    


 


Cabot Rings  Not Reportable   11/28/19  11:54    


 


Clare Cells  Not Reportable   11/28/19  11:54    


 


Bite Cells  Not Reportable   11/28/19  11:54    


 


Crenated Cell  Not Reportable   11/28/19  11:54    


 


Elliptocytes  Not Reportable   11/28/19  11:54    


 


Acanthocytes (Spur)  Not Reportable   11/28/19  11:54    


 


Rouleaux  Not Reportable   11/28/19  11:54    


 


Hemoglobin C Crystals  Not Reportable   11/28/19  11:54    


 


Schistocytes  Not Reportable   11/28/19  11:54    


 


Malaria parasites  Not Reportable   11/28/19  11:54    


 


Davide Bodies  Not Reportable   11/28/19  11:54    


 


Hem Pathologist Commnt  No   11/28/19  11:54    


 


D-Dimer  5088.17 ng/mlDDU (0-234)  H  11/28/19  11:47    


 


POC ABG pH  7.453  (7.35-7.45)  H  11/28/19  21:30    


 


POC ABG pCO2  30.0  (35-45)  L  11/28/19  21:30    


 


POC ABG pO2  71  ()  L  11/28/19  13:04    


 


POC ABG HCO3  21.0  (22-26 mml/L)   11/28/19  21:30    


 


POC ABG Total CO2  22  (23-27mmol/L)   11/28/19  21:30    


 


POC ABG O2 Sat  76   11/28/19  21:30    


 


POC ABG Base Excess  -3  ((-2) - (+3)mmol/L)   11/28/19  21:30    


 


VBG pH  6.993  (7.320-7.420)  L*  11/26/19  18:43    


 


FiO2  70 %  11/28/19  21:30    


 


Sodium  141 mmol/L (137-145)   11/29/19  05:06    


 


Potassium  3.0 mmol/L (3.6-5.0)  L  11/29/19  05:06    


 


Chloride  108.2 mmol/L ()  H  11/29/19  05:06    


 


Carbon Dioxide  19 mmol/L (22-30)  L  11/29/19  05:06    


 


Anion Gap  17 mmol/L  11/29/19  05:06    


 


BUN  7 mg/dL (9-20)  L  11/29/19  05:06    


 


Creatinine  1.3 mg/dL (0.8-1.5)   11/29/19  05:06    


 


Estimated GFR  > 60 ml/min  11/29/19  05:06    


 


BUN/Creatinine Ratio  5 %  11/29/19  05:06    


 


Glucose  173 mg/dL ()  H  11/29/19  05:06    


 


POC Glucose  148  ()  H  11/29/19  09:31    


 


Hemoglobin A1c  15.1 % (4-6)  H  11/28/19  11:54    


 


Lactic Acid  1.60 mmol/L (0.7-2.0)   11/28/19  22:53    


 


Calcium  8.1 mg/dL (8.4-10.2)  L  11/29/19  05:06    


 


Phosphorus  1.40 mg/dL (2.5-4.5)  L  11/29/19  05:06    


 


Magnesium  1.50 mg/dL (1.7-2.3)  L  11/29/19  05:06    


 


Total Bilirubin  0.20 mg/dL (0.1-1.2)   11/28/19  11:47    


 


Direct Bilirubin  < 0.2 mg/dL (0-0.2)   11/26/19  18:43    


 


Indirect Bilirubin  0.0 mg/dL  11/26/19  18:43    


 


AST  39 units/L (5-40)   11/28/19  11:47    


 


ALT  11 units/L (7-56)   11/28/19  11:47    


 


Alkaline Phosphatase  150 units/L ()  H  11/28/19  11:47    


 


Total Creatine Kinase  140 units/L ()   11/28/19  18:43    


 


CK-MB (CK-2)  3.5 ng/mL (0.0-4.0)   11/28/19  18:43    


 


CK-MB (CK-2) Rel Index  2.5  (0-4)   11/28/19  18:43    


 


Troponin T  < 0.010 ng/mL (0.00-0.029)   11/28/19  18:43    


 


NT-Pro-B Natriuret Pep  1292 pg/mL (0-450)  H  11/28/19  18:43    


 


Total Protein  5.6 g/dL (6.3-8.2)  L D 11/28/19  11:47    


 


Albumin  3.4 g/dL (3.9-5)  L  11/28/19  11:47    


 


Albumin/Globulin Ratio  1.5 %  11/28/19  11:47    


 


Lipase  64 units/L (13-60)  H  11/26/19  18:43    


 


Urine Color  Straw  (Yellow)   11/26/19  21:29    


 


Urine Turbidity  Clear  (Clear)   11/26/19  21:29    


 


Urine pH  5.0  (5.0-7.0)   11/26/19  21:29    


 


Ur Specific Gravity  1.013  (1.003-1.030)   11/26/19  21:29    


 


Urine Protein  100 mg/dl mg/dL (Negative)   11/26/19  21:29    


 


Urine Glucose (UA)  >=500 mg/dL (Negative)   11/26/19  21:29    


 


Urine Ketones  80 mg/dL (Negative)   11/26/19  21:29    


 


Urine Blood  Sm  (Negative)   11/26/19  21:29    


 


Urine Nitrite  Neg  (Negative)   11/26/19  21:29    


 


Urine Bilirubin  Neg  (Negative)   11/26/19  21:29    


 


Urine Urobilinogen  < 2.0 mg/dL (<2.0)   11/26/19  21:29    


 


Ur Leukocyte Esterase  Neg  (Negative)   11/26/19  21:29    


 


Urine WBC (Auto)  1.0 /HPF (0.0-6.0)   11/26/19  21:29    


 


Urine RBC (Auto)  3.0 /HPF (0.0-6.0)   11/26/19  21:29    


 


U Epithel Cells (Auto)  1.0 /HPF (0-13.0)   11/26/19  21:29    


 


Urine Bacteria (Auto)  1+ /HPF (Negative)   11/26/19  21:29    


 


Urine Mucus  Few /HPF  11/26/19  21:29    














Active Medications





- Current Medications


Current Medications: 














Generic Name Dose Route Start Last Admin





  Trade Name Freq  PRN Reason Stop Dose Admin


 


Al Hydrox/Mg Hydrox/Simethicone  15 ml  11/27/19 19:03 





  Alum-Mag Hydrox-Simeth 616-460-03xo/5ml  PO  





  Q4H PRN  





  Indigestion  


 


Dextrose  0 ml  11/28/19 13:16 





  D50w (25gm) Syringe  IV  





  Q30MIN PRN  





  Hypoglycemia  





  Protocol  


 


Enoxaparin Sodium  40 mg  11/28/19 22:00  11/28/19 21:36





  Enoxaparin  SUB-Q   40 mg





  QDAY@2200 SAI   Administration


 


Famotidine  20 mg  11/27/19 22:00  11/28/19 21:37





  Pepcid  IV   20 mg





  BID SAI   Administration


 


Ceftriaxone Sodium  1 gm in 50 mls @ 100 mls/hr  11/27/19 10:00  11/28/19 11:48





  Rocephin/Ns 1 Gm/50 Ml  IV  11/29/19 10:29  100 mls/hr





  Q24HR SAI   Administration





  Protocol  


 


Sodium Chloride  1,000 mls @ 125 mls/hr  11/27/19 20:00 





  Nacl 0.9% 1000 Ml  IV  





  AS DIRECT SAI  


 


Azithromycin 500 mg/ Sodium  250 mls @ 250 mls/hr  11/28/19 18:00  11/28/19 

19:38





  Chloride  IV  12/01/19 17:59  250 mls/hr





  Q24HR SAI   Administration





  Protocol  


 


Potassium Phosphate 40 mmol/  513.3333 mls @ 83 mls/hr  11/29/19 08:30 





  Sodium Chloride  IV  11/29/19 14:41 





  ONCE ONE  


 


Magnesium Sulfate  2 gm in 50 mls @ 25 mls/hr  11/29/19 09:18 





  Magnesium Sulfate 2gm/50ml  IV  11/29/19 11:17 





  ONCE ONE  


 


Insulin Human Isoph/Insulin Regular  15 unit  11/29/19 17:00 





  Humulin 70/30  SUB-Q  





  BIDDIAB SAI  


 


Insulin Human Isoph/Insulin Regular  5 unit  11/29/19 09:22 





  Humulin 70/30  SUB-Q  11/29/19 09:23 





  ONCE ONE  


 


Insulin Human Lispro  0 unit  11/29/19 11:30 





  Humalog  SUB-Q  





  ACHS SAI  





  Protocol  


 


Metoprolol Tartrate  12.5 mg  11/28/19 22:00  11/28/19 21:35





  Metoprolol  PO   12.5 mg





  BID SAI   Administration


 


Ondansetron HCl  4 mg  11/27/19 19:03  11/27/19 21:20





  Zofran  IV   4 mg





  Q4H PRN   Administration





  Nausea And Vomiting  


 


Potassium Chloride  40 meq  11/29/19 08:30 





  K-Dur  PO  11/29/19 09:30 





  ONCE NR  


 


Sodium Chloride  10 ml  11/26/19 22:00  11/28/19 21:37





  Sodium Chloride Flush Syringe 10 Ml  IV   10 ml





  BID SAI   Administration


 


Sodium Chloride  10 ml  11/26/19 20:04 





  Sodium Chloride Flush Syringe 10 Ml  IV  





  PRN PRN  





  LINE FLUSH  


 


Zolpidem Tartrate  5 mg  11/27/19 19:05 





  Ambien  PO  





  QHS PRN  





  Sleep  














Nutrition/Malnutrition Assess





- Dietary Evaluation


Nutrition/Malnutrition Findings: 


                                        





Nutrition Notes                                            Start:  11/28/19 

11:49


Freq:                                                      Status: Active       

 


Protocol:                                                                       

 


 Document     11/28/19 11:49  LM  (Rec: 11/28/19 12:08  LM  [id number])


 Nutrition Notes


     Need for Assessment generated from:         Low BMI


     Initial or Follow up                        Assessment


     Current Diagnosis                           Diabetes


     Other Pertinent Diagnosis                   DKA


     Current Diet                                Consistent CHO


     Labs/Tests                                  


                                                 BUN 7


     Pertinent Medications                       Humulin


                                                 Humalog


     Height                                      5 ft 4 in


     Weight                                      43.2 kg


     Usual Body Weight                           62.7 kg


     Ideal Body Weight (kg)                      59.09


     BMI                                         16.3


     Weight change and time frame                31% wt loss (43 lb). Time


                                                 frame unknown


     Subjective/Other Information                Low BMI screen. Pt stated that


                                                 he ate a fruit cup and water


                                                 for breakfast. Pt stated he


                                                 drinks a lot of water at home


                                                 when asked about food. Pt


                                                 denied any wt changes. Pt said


                                                 his UBW is 138 lb (62.7 kg).


                                                 Observed wasting in legs.


     Burn                                        Absent


     Trauma                                      Absent


     Current % PO                                Poor (25-49%)


     Minimum of two criteria                     Yes


     Interpretation of Weight Loss (severe)      > 20% in 1 year


     Body Fat Depletion                          Mild depletion (non-severe)


     #2


      Nutrition Diagnosis                        Food and nutrition-related


                                                 knowledge deficit


      Etiology                                   pt non complient with DM diet


      As Evidenced by Signs and Symptoms         Pt needing DM diet education,


                                                 


     #1


      Nutrition Diagnosis                        Malnutrition


      Etiology                                   DKA


      As Evidenced by Signs and Symptoms         31% wt loss, muscle and fat


                                                 wasting


     Is patient on ventilator?                   No


     Is Patient Ambulatory and/or Out of Bed     Yes


     REE-([facility]-ambulatory/OOB) [     [phone]


      [email or web address]]                               


     Kcal/Kg value to use for calculation        49


     Approximate Energy Requirements Using       2117


      kcal/Kg                                    


     Calculation Used for Recommendations        Kcal/kg


     Additional Notes                            Protein: 52-65g (1.2-1.5g/kg)


                                                 Fluid: 1 ml/kcal


 Nutrition Intervention


     Change Diet Order:                          Continue consistent CHO


     Teaching Recipient                          Patient


     Learning Readiness                          Fair


     Teaching Methods                            Discussion,Handout


     Response to Teaching                        Reinforcement needed


     Education Handouts Provided                 Carbohydrate Counting for


                                                 People with Diabetes


     Barriers to Learning                        Cognitive/Verbal


     RD phone number provided                    Yes


     Patient aware of follow up options          Yes


     Goal #1                                     Meet at least 80% of energy


                                                 and protein needs


     Anticipated Discharge Needs:                Consisntent CHO


     Follow-Up By:                               12/02/19


     Additional Comments                         F/U for intakes, ONS needs, DM


                                                 diet education reinforcement

## 2019-11-30 LAB
BUN SERPL-MCNC: 7 MG/DL (ref 9–20)
BUN/CREAT SERPL: 5 %
CALCIUM SERPL-MCNC: 8.3 MG/DL (ref 8.4–10.2)
HEMOLYSIS INDEX: 9

## 2019-11-30 PROCEDURE — 5A09357 ASSISTANCE WITH RESPIRATORY VENTILATION, LESS THAN 24 CONSECUTIVE HOURS, CONTINUOUS POSITIVE AIRWAY PRESSURE: ICD-10-PCS | Performed by: HOSPITALIST

## 2019-11-30 RX ADMIN — INSULIN HUMAN SCH UNIT: 100 INJECTION, SUSPENSION SUBCUTANEOUS at 08:45

## 2019-11-30 RX ADMIN — METOPROLOL TARTRATE SCH MG: 25 TABLET, FILM COATED ORAL at 21:20

## 2019-11-30 RX ADMIN — INSULIN LISPRO SCH UNIT: 100 INJECTION, SOLUTION INTRAVENOUS; SUBCUTANEOUS at 07:42

## 2019-11-30 RX ADMIN — Medication SCH ML: at 21:25

## 2019-11-30 RX ADMIN — INSULIN HUMAN SCH UNIT: 100 INJECTION, SUSPENSION SUBCUTANEOUS at 18:36

## 2019-11-30 RX ADMIN — POTASSIUM CHLORIDE SCH MLS/HR: 10 INJECTION, SOLUTION INTRAVENOUS at 17:00

## 2019-11-30 RX ADMIN — ENOXAPARIN SODIUM SCH MG: 100 INJECTION SUBCUTANEOUS at 21:19

## 2019-11-30 RX ADMIN — FAMOTIDINE SCH MG: 20 TABLET ORAL at 09:54

## 2019-11-30 RX ADMIN — POTASSIUM CHLORIDE SCH MLS/HR: 10 INJECTION, SOLUTION INTRAVENOUS at 12:51

## 2019-11-30 RX ADMIN — INSULIN LISPRO SCH UNIT: 100 INJECTION, SOLUTION INTRAVENOUS; SUBCUTANEOUS at 18:36

## 2019-11-30 RX ADMIN — INSULIN LISPRO SCH UNIT: 100 INJECTION, SOLUTION INTRAVENOUS; SUBCUTANEOUS at 12:50

## 2019-11-30 RX ADMIN — POTASSIUM CHLORIDE SCH MLS/HR: 10 INJECTION, SOLUTION INTRAVENOUS at 12:00

## 2019-11-30 RX ADMIN — INSULIN LISPRO SCH UNIT: 100 INJECTION, SOLUTION INTRAVENOUS; SUBCUTANEOUS at 23:05

## 2019-11-30 RX ADMIN — FAMOTIDINE SCH MG: 20 TABLET ORAL at 21:25

## 2019-11-30 RX ADMIN — AZITHROMYCIN SCH MLS/HR: 500 INJECTION, POWDER, LYOPHILIZED, FOR SOLUTION INTRAVENOUS at 09:53

## 2019-11-30 RX ADMIN — METOPROLOL TARTRATE SCH MG: 25 TABLET, FILM COATED ORAL at 09:54

## 2019-11-30 RX ADMIN — POTASSIUM CHLORIDE SCH MLS/HR: 10 INJECTION, SOLUTION INTRAVENOUS at 15:50

## 2019-11-30 NOTE — PROGRESS NOTE
Assessment and Plan





--Acute hypoxic respiratory failure


On high flow oxygen at 70 percent. titrate O2 sats to more than 90%, nebulizers.

 


Management per pulmonary critical


If no improvement intubate as needed





--Hypokalemia;


Replenish per protocol.  


--Hypophosphatemia


Replenish per protocol


--Hypomagnesemia


Replenish per protocol





--DKA (diabetic ketoacidoses)


Current Visit: No   Status: Acute   


s/p DKA pathway ,A gap closed, acidosis improved


Start ADA diet, DC insulin drip


Start long-acting insulin, diabetic education nutrition consult. 





--Type I DM:


uncontrolled,Accu check,SSC


70/30 insulin start low-dose just as needed ,ADA diet





--Severe dehydration;


Vigorous IV hydration





--Sepsis


Current Visit: Yes   Status: Acute   


Initiate Sepsis Protocol: IV antibiotic therapy, 


IVF resuscitation therapy, blood cultures, 





--Severe Metabolic Acidosis


Current Visit: Yes   Status: Acute  


Mild improvement, IV sodium bicarb


IV hydration, closely monitor





-- ARIANNA (acute kidney injury)


Current Visit: Yes   Status: Acute   


Vasomotor nephropathy IVF resuscitation therapy, 


Resolved





-- DVT prophylaxis


Current Visit: Yes   Status: Acute   


Lovenox s/c





CCT 40 minutes








Subjective


Date of service: 11/30/19


Principal diagnosis: Ac Hypoxemic Resp Failure; Jesus. Pulm infiltrates; DKA; 

Sepsis


Interval history: 


38 YO Male with DM, Gastritis presents to ED for evaluation. Pt states that he 

has experienced nausea, and multiple episodes of vomiting over the past 1 day 

with persistent symptoms over the same time frame. Pt transported to [facility] via 

private vehicle. Pt seen and evaluated in ED and found to have DKA, Sepsis, Aci

dosis, and ARIANNA. Pt found to be critically ill and admitted to ICU and initiated 

on DKA and Sepsis protocols. Pt denies fever, chills, CP, palpitations, Trauma, 

BRBPR, Productive cough, skin rash or recent ill contacts. Pulmonary team 

consulted in ED.


Doing better


Feels weak


Vomiting has subsided











Objective





- Constitutional


Vitals: 


                               Vital Signs - 12hr











  11/29/19 11/29/19 11/30/19





  23:23 23:48 00:00


 


Temperature   98.6 F


 


Pulse Rate 116 H  125 H


 


Respiratory 21  22





Rate   


 


Blood Pressure 90/48  90/48


 


O2 Sat by Pulse 98 98 97





Oximetry   














  11/30/19 11/30/19 11/30/19





  01:00 02:00 03:00


 


Temperature   


 


Pulse Rate 113 H 114 H 112 H


 


Respiratory 20 24 20





Rate   


 


Blood Pressure 99/66 97/57 107/69


 


O2 Sat by Pulse 98 100 98





Oximetry   














  11/30/19 11/30/19 11/30/19





  03:38 03:39 04:00


 


Temperature 98.5 F  


 


Pulse Rate   110 H


 


Respiratory   21





Rate   


 


Blood Pressure   101/65


 


O2 Sat by Pulse  97 97





Oximetry   














  11/30/19 11/30/19 11/30/19





  05:00 06:00 07:00


 


Temperature   


 


Pulse Rate 106 H 107 H 106 H


 


Respiratory 18 17 18





Rate   


 


Blood Pressure 99/60 110/73 113/75


 


O2 Sat by Pulse 98 99 98





Oximetry   














  11/30/19 11/30/19





  08:05 09:54


 


Temperature  


 


Pulse Rate  115 H


 


Respiratory  





Rate  


 


Blood Pressure  109/80


 


O2 Sat by Pulse 95 





Oximetry  











General appearance: Present: no acute distress, well-nourished





- EENT


Eyes: PERRL, EOM intact


ENT: hearing intact, clear oral mucosa


Ears: bilateral: normal





- Neck


Neck: supple, normal ROM





- Respiratory


Respiratory effort: normal


Respiratory: bilateral: CTA





- Breasts


Breasts: normal





- Cardiovascular


Heart rate: 123


Rhythm: regular


Heart Sounds: Present: S1 & S2.  Absent: gallop, rub


Extremities: no ischemia, pulses intact, No edema, normal color, Full ROM





- Gastrointestinal


General gastrointestinal: Present: soft, non-tender, non-distended, normal bowel

sounds





- Genitourinary


Male genitourinary: normal





- Integumentary


Integumentary: clear, warm, dry





- Musculoskeletal


Musculoskeletal: 1, strength equal bilaterally





- Neurologic


Neurologic: moves all extremities





- Psychiatric


Psychiatric: memory intact, appropriate mood/affect, intact judgment & insight





- Labs


CBC & Chem 7: 


                                 11/28/19 11:54





                                 11/30/19 06:25


Labs: 


                              Abnormal lab results











  11/29/19 11/29/19 11/29/19 Range/Units





  10:42 11:53 13:28 


 


Sodium     (137-145)  mmol/L


 


Potassium    3.2 L  (3.6-5.0)  mmol/L


 


Chloride     ()  mmol/L


 


Carbon Dioxide    14 L  (22-30)  mmol/L


 


BUN    7 L  (9-20)  mg/dL


 


Glucose    256 H  ()  mg/dL


 


POC Glucose  191 H  181 H   ()  


 


Calcium    8.0 L  (8.4-10.2)  mg/dL














  11/29/19 11/29/19 11/30/19 Range/Units





  16:14 22:24 06:25 


 


Sodium    148 H  (137-145)  mmol/L


 


Potassium    3.2 L  (3.6-5.0)  mmol/L


 


Chloride    116.3 H  ()  mmol/L


 


Carbon Dioxide    17 L  (22-30)  mmol/L


 


BUN    7 L  (9-20)  mg/dL


 


Glucose    192 H  ()  mg/dL


 


POC Glucose  334 H  106 H   ()  


 


Calcium    8.3 L  (8.4-10.2)  mg/dL














  11/30/19 Range/Units





  07:38 


 


Sodium   (137-145)  mmol/L


 


Potassium   (3.6-5.0)  mmol/L


 


Chloride   ()  mmol/L


 


Carbon Dioxide   (22-30)  mmol/L


 


BUN   (9-20)  mg/dL


 


Glucose   ()  mg/dL


 


POC Glucose  200 H  ()  


 


Calcium   (8.4-10.2)  mg/dL

## 2019-11-30 NOTE — PROGRESS NOTE
Assessment and Plan








Acute Hypoxemic Respiratory Failure


Bilateral Pulmonary infiltrates (suspect Pulm edema over Pneumonia)


DKA (diabetic ketoacidosis)


severe metabolic acidosis


Severe dehydration


Sepsis


Hypokalemia


Severe Metabolic Acidosis


ARIANNA (acute kidney injury)





- repeat CXR in am


- continue  DKA protocol


- Change to qhs BIPAP with daytime Vapotherm system


- wean FiO2 to keep O2 sat's > 90%


- continue bronchodilators with pulmonary hygiene per RT


- continue to watch closely for N&V


- reduce bicarbonate drip rate as pH improved (stop shortly)


- continue hold on diuretics for now


- follow sputum C&S


- continue empiric Rocephin


- added empiric Zithromax for CAP coverage


- get Procalcitonin and lactate levels to aid clinical decision making


- prn ABG's


- consider ID consultation re: Leucocytosis also


- conservative volume management


- quick ACS w/up with cardiac enzymes, EKG & 2D ECHO


- continue GI & VTE prophylaxis with Famotidine and Enoxaparin


- continue other care per attending / other consuiltants





... re-evaluate in am & prn








The high probability of a clinically significant, sudden or life threatening 

deterioration of the [Endocrine, renal,  respiratory] system(s) required my full

and direct attention, intervention and personal management. The aggregate 

critical care time was [34] minutes. This time is in addition to time spent 

performing 


reported procedures but includes the following: 





[x] Data Review and interpretation 





[x] Patient assessment and monitoring of vital signs 





[x] Documentation 





[x] Medication orders and management














Subjective


Date of service: 11/30/19


Principal diagnosis: Ac Hypoxemic Resp Failure; Jesus. Pulm infiltrates; DKA; 

Sepsis


Interval history: 





Patient is seen today for: Acute Hypoxemic Respiratory Failure; Bilateral 

Pulmonary infiltrates (suspect Pulm edema over Pneumonia); DKA (diabetic 

ketoacidosis); severe metabolic acidosis


Sepsis Syndrome





Seen and examined at bedside; 24-hour events reviewed; nursing and respiratory 

care staff consulted; no adverse overnight events reported to me; resting in 

bed; still with significant hypoxemia but tolerating Vapotherm HFNC at 90% FiO2 

and 40L flow; no hemoptysis; denies acute chest pains or palpitations; No 

N/V/F/C





Objective


                               Vital Signs - 12hr











  11/30/19 11/30/19 11/30/19





  03:00 03:38 03:39


 


Temperature  98.5 F 


 


Pulse Rate 112 H  


 


Respiratory 20  





Rate   


 


Blood Pressure 107/69  


 


O2 Sat by Pulse 98  97





Oximetry   














  11/30/19 11/30/19 11/30/19





  04:00 05:00 06:00


 


Temperature   


 


Pulse Rate 110 H 106 H 107 H


 


Respiratory 21 18 17





Rate   


 


Blood Pressure 101/65 99/60 110/73


 


O2 Sat by Pulse 97 98 99





Oximetry   














  11/30/19 11/30/19 11/30/19





  07:00 08:00 08:05


 


Temperature   


 


Pulse Rate 106 H 106 H 


 


Respiratory 18 17 





Rate   


 


Blood Pressure 113/75 108/74 


 


O2 Sat by Pulse 98 98 95





Oximetry   














  11/30/19 11/30/19 11/30/19





  09:00 09:54 10:00


 


Temperature   


 


Pulse Rate 117 H 115 H 112 H


 


Respiratory 24  15





Rate   


 


Blood Pressure 109/80 109/80 111/79


 


O2 Sat by Pulse 88  95





Oximetry   














  11/30/19 11/30/19





  11:00 12:00


 


Temperature  99.0 F


 


Pulse Rate 101 H 


 


Respiratory 21 





Rate  


 


Blood Pressure 106/74 


 


O2 Sat by Pulse 95 





Oximetry  











Constitutional: appears uncomfortable, other (Young male, normocephalic with 

mildly increased respiratory effort at rest)


Eyes: non-icteric


ENT: oropharynx moist, other (Mallampati 2)


Neck: supple, no JVD


Effort: mildly labored


Ascultation: Bilateral: diminished breath sounds, rales


Percussion: Bilateral: not dull


Cardiovascular: regular rate and rhythm, other (S1,S2, no murmurs)


Gastrointestinal: normoactive bowel sounds, soft, non-tender, non-distended


Integumentary: normal


Extremities: no cyanosis, no edema, pulses normal, no ischemia or petechiae


Neurologic: normal mental status, non-focal exam, pupils equal and round, CN II-

XII normal, motor strength normal and


Psychiatric: mood appropriate, anxious


CBC and BMP: 


                                 12/01/19 09:30





                                 12/01/19 06:00


ABG, PT/INR, D-dimer: 


ABG











POC ABG pH  7.453  (7.35-7.45)  H  11/28/19  21:30    


 


POC ABG pCO2  30.0  (35-45)  L  11/28/19  21:30    


 


POC ABG HCO3  21.0  (22-26 mml/L)   11/28/19  21:30    


 


POC ABG Total CO2  22  (23-27mmol/L)   11/28/19  21:30    


 


POC ABG O2 Sat  76   11/28/19  21:30    





PT/INR, D-dimer











D-Dimer  5088.17 ng/mlDDU (0-234)  H  11/28/19  11:47    








Abnormal lab findings: 


                                  Abnormal Labs











  11/26/19 11/26/19 11/26/19





  18:43 18:43 18:43


 


WBC   


 


MCHC   


 


Seg Neuts % (Manual)   


 


Lymphocytes % (Manual)   


 


Seg Neutrophils # Man   


 


Lymphocytes # (Manual)   


 


Monocytes # (Manual)   


 


D-Dimer   


 


POC ABG pH   


 


POC ABG pCO2   


 


POC ABG pO2   


 


VBG pH   


 


Sodium   134 L 


 


Potassium   


 


Chloride   92.4 L 


 


Carbon Dioxide   3 L* 


 


BUN   


 


Creatinine   1.6 H 


 


Glucose   581 H* 


 


POC Glucose   


 


Hemoglobin A1c   


 


Lactic Acid   


 


Calcium   


 


Phosphorus  5.50 H  


 


Magnesium   


 


Alkaline Phosphatase    166 H


 


NT-Pro-B Natriuret Pep   


 


Total Protein   


 


Albumin   


 


Lipase   














  11/26/19 11/26/19 11/26/19





  18:43 18:43 19:04


 


WBC   


 


MCHC   


 


Seg Neuts % (Manual)   


 


Lymphocytes % (Manual)   


 


Seg Neutrophils # Man   


 


Lymphocytes # (Manual)   


 


Monocytes # (Manual)   


 


D-Dimer   


 


POC ABG pH   


 


POC ABG pCO2   


 


POC ABG pO2   


 


VBG pH  6.993 L*  


 


Sodium   


 


Potassium   


 


Chloride   


 


Carbon Dioxide   


 


BUN   


 


Creatinine   


 


Glucose   


 


POC Glucose    437 H


 


Hemoglobin A1c   


 


Lactic Acid   


 


Calcium   


 


Phosphorus   


 


Magnesium   


 


Alkaline Phosphatase   


 


NT-Pro-B Natriuret Pep   


 


Total Protein   


 


Albumin   


 


Lipase   64 H 














  11/26/19 11/26/19 11/26/19





  19:40 20:14 20:45


 


WBC   24.8 H 


 


MCHC   31 L 


 


Seg Neuts % (Manual)   89.0 H 


 


Lymphocytes % (Manual)   7.0 L 


 


Seg Neutrophils # Man   22.1 H 


 


Lymphocytes # (Manual)   


 


Monocytes # (Manual)   1.0 H 


 


D-Dimer   


 


POC ABG pH   


 


POC ABG pCO2   


 


POC ABG pO2   


 


VBG pH   


 


Sodium  135 L  


 


Potassium   


 


Chloride  94.4 L  


 


Carbon Dioxide  2 L*  


 


BUN   


 


Creatinine  1.6 H  


 


Glucose  536 H*  


 


POC Glucose    411 H


 


Hemoglobin A1c   


 


Lactic Acid   


 


Calcium   


 


Phosphorus  5.10 H  


 


Magnesium   


 


Alkaline Phosphatase   


 


NT-Pro-B Natriuret Pep   


 


Total Protein   


 


Albumin   


 


Lipase   














  11/26/19 11/26/19 11/26/19





  21:25 21:25 21:59


 


WBC   


 


MCHC   


 


Seg Neuts % (Manual)   


 


Lymphocytes % (Manual)   


 


Seg Neutrophils # Man   


 


Lymphocytes # (Manual)   


 


Monocytes # (Manual)   


 


D-Dimer   


 


POC ABG pH   


 


POC ABG pCO2   


 


POC ABG pO2   


 


VBG pH   


 


Sodium   


 


Potassium  3.5 L  


 


Chloride   


 


Carbon Dioxide  5 L*  


 


BUN   


 


Creatinine   


 


Glucose  370 H  


 


POC Glucose    289 H


 


Hemoglobin A1c   


 


Lactic Acid   2.80 H* 


 


Calcium  8.2 L  


 


Phosphorus   


 


Magnesium   


 


Alkaline Phosphatase   


 


NT-Pro-B Natriuret Pep   


 


Total Protein   


 


Albumin   


 


Lipase   














  11/26/19 11/27/19 11/27/19





  23:16 00:32 00:37


 


WBC   


 


MCHC   


 


Seg Neuts % (Manual)   


 


Lymphocytes % (Manual)   


 


Seg Neutrophils # Man   


 


Lymphocytes # (Manual)   


 


Monocytes # (Manual)   


 


D-Dimer   


 


POC ABG pH   


 


POC ABG pCO2   


 


POC ABG pO2   


 


VBG pH   


 


Sodium    148 H


 


Potassium    3.0 L


 


Chloride    116.1 H


 


Carbon Dioxide    8 L*


 


BUN   


 


Creatinine   


 


Glucose    135 H


 


POC Glucose  192 H  127 H 


 


Hemoglobin A1c   


 


Lactic Acid   


 


Calcium    7.1 L


 


Phosphorus   


 


Magnesium   


 


Alkaline Phosphatase   


 


NT-Pro-B Natriuret Pep   


 


Total Protein   


 


Albumin   


 


Lipase   














  11/27/19 11/27/19 11/27/19





  01:17 02:17 03:27


 


WBC   


 


MCHC   


 


Seg Neuts % (Manual)   


 


Lymphocytes % (Manual)   


 


Seg Neutrophils # Man   


 


Lymphocytes # (Manual)   


 


Monocytes # (Manual)   


 


D-Dimer   


 


POC ABG pH   


 


POC ABG pCO2   


 


POC ABG pO2   


 


VBG pH   


 


Sodium   


 


Potassium   


 


Chloride   


 


Carbon Dioxide   


 


BUN   


 


Creatinine   


 


Glucose   


 


POC Glucose  135 H  166 H  254 H


 


Hemoglobin A1c   


 


Lactic Acid   


 


Calcium   


 


Phosphorus   


 


Magnesium   


 


Alkaline Phosphatase   


 


NT-Pro-B Natriuret Pep   


 


Total Protein   


 


Albumin   


 


Lipase   














  11/27/19 11/27/19 11/27/19





  04:13 04:34 05:41


 


WBC   


 


MCHC   


 


Seg Neuts % (Manual)   


 


Lymphocytes % (Manual)   


 


Seg Neutrophils # Man   


 


Lymphocytes # (Manual)   


 


Monocytes # (Manual)   


 


D-Dimer   


 


POC ABG pH   


 


POC ABG pCO2   


 


POC ABG pO2   


 


VBG pH   


 


Sodium  147 H  


 


Potassium  3.3 L  


 


Chloride  113.4 H  


 


Carbon Dioxide  7 L*  


 


BUN   


 


Creatinine   


 


Glucose  320 H  


 


POC Glucose   320 H  333 H


 


Hemoglobin A1c   


 


Lactic Acid   


 


Calcium  7.5 L  


 


Phosphorus   


 


Magnesium   


 


Alkaline Phosphatase   


 


NT-Pro-B Natriuret Pep   


 


Total Protein   


 


Albumin   


 


Lipase   














  11/27/19 11/27/19 11/27/19





  06:20 07:27 07:53


 


WBC   


 


MCHC   


 


Seg Neuts % (Manual)   


 


Lymphocytes % (Manual)   


 


Seg Neutrophils # Man   


 


Lymphocytes # (Manual)   


 


Monocytes # (Manual)   


 


D-Dimer   


 


POC ABG pH   


 


POC ABG pCO2   


 


POC ABG pO2   


 


VBG pH   


 


Sodium   


 


Potassium   


 


Chloride   


 


Carbon Dioxide   


 


BUN   


 


Creatinine   


 


Glucose   


 


POC Glucose  363 H  343 H  319 H


 


Hemoglobin A1c   


 


Lactic Acid   


 


Calcium   


 


Phosphorus   


 


Magnesium   


 


Alkaline Phosphatase   


 


NT-Pro-B Natriuret Pep   


 


Total Protein   


 


Albumin   


 


Lipase   














  11/27/19 11/27/19 11/27/19





  08:46 09:45 10:03


 


WBC   


 


MCHC   


 


Seg Neuts % (Manual)   


 


Lymphocytes % (Manual)   


 


Seg Neutrophils # Man   


 


Lymphocytes # (Manual)   


 


Monocytes # (Manual)   


 


D-Dimer   


 


POC ABG pH   


 


POC ABG pCO2   


 


POC ABG pO2   


 


VBG pH   


 


Sodium    148 H


 


Potassium    3.1 L


 


Chloride    114.1 H


 


Carbon Dioxide    4 L*


 


BUN   


 


Creatinine   


 


Glucose    364 H


 


POC Glucose  402 H  340 H 


 


Hemoglobin A1c   


 


Lactic Acid   


 


Calcium    8.3 L


 


Phosphorus   


 


Magnesium   


 


Alkaline Phosphatase   


 


NT-Pro-B Natriuret Pep   


 


Total Protein   


 


Albumin   


 


Lipase   














  11/27/19 11/27/19 11/27/19





  10:42 11:26 12:53


 


WBC   


 


MCHC   


 


Seg Neuts % (Manual)   


 


Lymphocytes % (Manual)   


 


Seg Neutrophils # Man   


 


Lymphocytes # (Manual)   


 


Monocytes # (Manual)   


 


D-Dimer   


 


POC ABG pH   


 


POC ABG pCO2   


 


POC ABG pO2   


 


VBG pH   


 


Sodium   


 


Potassium   


 


Chloride   


 


Carbon Dioxide   


 


BUN   


 


Creatinine   


 


Glucose   


 


POC Glucose  321 H  241 H  175 H


 


Hemoglobin A1c   


 


Lactic Acid   


 


Calcium   


 


Phosphorus   


 


Magnesium   


 


Alkaline Phosphatase   


 


NT-Pro-B Natriuret Pep   


 


Total Protein   


 


Albumin   


 


Lipase   














  11/27/19 11/27/19 11/27/19





  13:36 13:47 17:36


 


WBC   


 


MCHC   


 


Seg Neuts % (Manual)   


 


Lymphocytes % (Manual)   


 


Seg Neutrophils # Man   


 


Lymphocytes # (Manual)   


 


Monocytes # (Manual)   


 


D-Dimer   


 


POC ABG pH   


 


POC ABG pCO2   


 


POC ABG pO2   


 


VBG pH   


 


Sodium  151 H  


 


Potassium  3.1 L  


 


Chloride  126.5 H  


 


Carbon Dioxide  12 L D  


 


BUN   


 


Creatinine   


 


Glucose  131 H  


 


POC Glucose   120 H  107 H


 


Hemoglobin A1c   


 


Lactic Acid   


 


Calcium  8.2 L  


 


Phosphorus   


 


Magnesium   


 


Alkaline Phosphatase   


 


NT-Pro-B Natriuret Pep   


 


Total Protein   


 


Albumin   


 


Lipase   














  11/27/19 11/27/19 11/27/19





  18:59 20:32 22:05


 


WBC   


 


MCHC   


 


Seg Neuts % (Manual)   


 


Lymphocytes % (Manual)   


 


Seg Neutrophils # Man   


 


Lymphocytes # (Manual)   


 


Monocytes # (Manual)   


 


D-Dimer   


 


POC ABG pH   


 


POC ABG pCO2   


 


POC ABG pO2   


 


VBG pH   


 


Sodium   146 H 


 


Potassium   


 


Chloride   116.4 H 


 


Carbon Dioxide   10 L 


 


BUN   


 


Creatinine   


 


Glucose   267 H 


 


POC Glucose  153 H   336 H


 


Hemoglobin A1c   


 


Lactic Acid   


 


Calcium   


 


Phosphorus   


 


Magnesium   


 


Alkaline Phosphatase   


 


NT-Pro-B Natriuret Pep   


 


Total Protein   


 


Albumin   


 


Lipase   














  11/28/19 11/28/19 11/28/19





  05:00 07:47 11:21


 


WBC   


 


MCHC   


 


Seg Neuts % (Manual)   


 


Lymphocytes % (Manual)   


 


Seg Neutrophils # Man   


 


Lymphocytes # (Manual)   


 


Monocytes # (Manual)   


 


D-Dimer   


 


POC ABG pH   


 


POC ABG pCO2   


 


POC ABG pO2   


 


VBG pH   


 


Sodium   


 


Potassium  3.3 L  


 


Chloride  111.1 H  


 


Carbon Dioxide  7 L*  


 


BUN  7 L  


 


Creatinine   


 


Glucose  290 H  


 


POC Glucose   328 H  390 H


 


Hemoglobin A1c   


 


Lactic Acid   


 


Calcium   


 


Phosphorus  1.30 L D  


 


Magnesium   


 


Alkaline Phosphatase   


 


NT-Pro-B Natriuret Pep   


 


Total Protein   


 


Albumin   


 


Lipase   














  11/28/19 11/28/19 11/28/19





  11:47 11:47 11:54


 


WBC   


 


MCHC   


 


Seg Neuts % (Manual)   


 


Lymphocytes % (Manual)   


 


Seg Neutrophils # Man   


 


Lymphocytes # (Manual)   


 


Monocytes # (Manual)   


 


D-Dimer   5088.17 H 


 


POC ABG pH   


 


POC ABG pCO2   


 


POC ABG pO2   


 


VBG pH   


 


Sodium   


 


Potassium   


 


Chloride   


 


Carbon Dioxide  5 L*  


 


BUN  7 L  


 


Creatinine   


 


Glucose   


 


POC Glucose   


 


Hemoglobin A1c    15.1 H


 


Lactic Acid   


 


Calcium   


 


Phosphorus   


 


Magnesium   


 


Alkaline Phosphatase  150 H  


 


NT-Pro-B Natriuret Pep   


 


Total Protein  5.6 L D  


 


Albumin  3.4 L  


 


Lipase   














  11/28/19 11/28/19 11/28/19





  11:54 13:04 14:00


 


WBC  17.7 H  


 


MCHC   


 


Seg Neuts % (Manual)   


 


Lymphocytes % (Manual)  4.0 L  


 


Seg Neutrophils # Man  12.2 H  


 


Lymphocytes # (Manual)  0.7 L  


 


Monocytes # (Manual)   


 


D-Dimer   


 


POC ABG pH   7.076 L 


 


POC ABG pCO2   27.9 L 


 


POC ABG pO2   71 L 


 


VBG pH   


 


Sodium   


 


Potassium   


 


Chloride   


 


Carbon Dioxide   


 


BUN   


 


Creatinine   


 


Glucose   


 


POC Glucose    371 H


 


Hemoglobin A1c   


 


Lactic Acid   


 


Calcium   


 


Phosphorus   


 


Magnesium   


 


Alkaline Phosphatase   


 


NT-Pro-B Natriuret Pep   


 


Total Protein   


 


Albumin   


 


Lipase   














  11/28/19 11/28/19 11/28/19





  15:13 16:09 17:13


 


WBC   


 


MCHC   


 


Seg Neuts % (Manual)   


 


Lymphocytes % (Manual)   


 


Seg Neutrophils # Man   


 


Lymphocytes # (Manual)   


 


Monocytes # (Manual)   


 


D-Dimer   


 


POC ABG pH   


 


POC ABG pCO2   


 


POC ABG pO2   


 


VBG pH   


 


Sodium   


 


Potassium   


 


Chloride   


 


Carbon Dioxide   


 


BUN   


 


Creatinine   


 


Glucose   


 


POC Glucose  259 H  186 H  138 H


 


Hemoglobin A1c   


 


Lactic Acid   


 


Calcium   


 


Phosphorus   


 


Magnesium   


 


Alkaline Phosphatase   


 


NT-Pro-B Natriuret Pep   


 


Total Protein   


 


Albumin   


 


Lipase   














  11/28/19 11/28/19 11/28/19





  18:12 18:43 18:43


 


WBC   


 


MCHC   


 


Seg Neuts % (Manual)   


 


Lymphocytes % (Manual)   


 


Seg Neutrophils # Man   


 


Lymphocytes # (Manual)   


 


Monocytes # (Manual)   


 


D-Dimer   


 


POC ABG pH   


 


POC ABG pCO2   


 


POC ABG pO2   


 


VBG pH   


 


Sodium   


 


Potassium    3.0 L


 


Chloride   


 


Carbon Dioxide    15 L D


 


BUN    7 L


 


Creatinine   


 


Glucose    132 H


 


POC Glucose  121 H  


 


Hemoglobin A1c   


 


Lactic Acid   


 


Calcium    8.3 L


 


Phosphorus   1.70 L D 


 


Magnesium   1.50 L 


 


Alkaline Phosphatase   


 


NT-Pro-B Natriuret Pep   


 


Total Protein   


 


Albumin   


 


Lipase   














  11/28/19 11/28/19 11/28/19





  18:43 18:43 19:20


 


WBC   


 


MCHC   


 


Seg Neuts % (Manual)   


 


Lymphocytes % (Manual)   


 


Seg Neutrophils # Man   


 


Lymphocytes # (Manual)   


 


Monocytes # (Manual)   


 


D-Dimer   


 


POC ABG pH   


 


POC ABG pCO2   


 


POC ABG pO2   


 


VBG pH   


 


Sodium   


 


Potassium   


 


Chloride   


 


Carbon Dioxide   


 


BUN   


 


Creatinine   


 


Glucose   


 


POC Glucose    120 H


 


Hemoglobin A1c   


 


Lactic Acid  4.00 H*  


 


Calcium   


 


Phosphorus   


 


Magnesium   


 


Alkaline Phosphatase   


 


NT-Pro-B Natriuret Pep   1292 H 


 


Total Protein   


 


Albumin   


 


Lipase   














  11/28/19 11/28/19 11/28/19





  20:55 21:30 21:33


 


WBC   


 


MCHC   


 


Seg Neuts % (Manual)   


 


Lymphocytes % (Manual)   


 


Seg Neutrophils # Man   


 


Lymphocytes # (Manual)   


 


Monocytes # (Manual)   


 


D-Dimer   


 


POC ABG pH   7.453 H 


 


POC ABG pCO2  30.5 L  30.0 L 


 


POC ABG pO2   


 


VBG pH   


 


Sodium   


 


Potassium   


 


Chloride   


 


Carbon Dioxide   


 


BUN   


 


Creatinine   


 


Glucose   


 


POC Glucose    121 H


 


Hemoglobin A1c   


 


Lactic Acid   


 


Calcium   


 


Phosphorus   


 


Magnesium   


 


Alkaline Phosphatase   


 


NT-Pro-B Natriuret Pep   


 


Total Protein   


 


Albumin   


 


Lipase   














  11/28/19 11/28/19 11/28/19





  21:59 22:40 22:53


 


WBC   


 


MCHC   


 


Seg Neuts % (Manual)   


 


Lymphocytes % (Manual)   


 


Seg Neutrophils # Man   


 


Lymphocytes # (Manual)   


 


Monocytes # (Manual)   


 


D-Dimer   


 


POC ABG pH   


 


POC ABG pCO2   


 


POC ABG pO2   


 


VBG pH   


 


Sodium   


 


Potassium  3.0 L   2.6 L*


 


Chloride  107.9 H  


 


Carbon Dioxide  19 L   21 L


 


BUN  7 L   7 L


 


Creatinine   


 


Glucose  138 H   148 H


 


POC Glucose   137 H 


 


Hemoglobin A1c   


 


Lactic Acid   


 


Calcium  7.9 L   7.9 L


 


Phosphorus   


 


Magnesium   


 


Alkaline Phosphatase   


 


NT-Pro-B Natriuret Pep   


 


Total Protein   


 


Albumin   


 


Lipase   














  11/28/19 11/29/19 11/29/19





  23:41 02:54 03:51


 


WBC   


 


MCHC   


 


Seg Neuts % (Manual)   


 


Lymphocytes % (Manual)   


 


Seg Neutrophils # Man   


 


Lymphocytes # (Manual)   


 


Monocytes # (Manual)   


 


D-Dimer   


 


POC ABG pH   


 


POC ABG pCO2   


 


POC ABG pO2   


 


VBG pH   


 


Sodium   


 


Potassium   


 


Chloride   


 


Carbon Dioxide   


 


BUN   


 


Creatinine   


 


Glucose   


 


POC Glucose  115 H  143 H  126 H


 


Hemoglobin A1c   


 


Lactic Acid   


 


Calcium   


 


Phosphorus   


 


Magnesium   


 


Alkaline Phosphatase   


 


NT-Pro-B Natriuret Pep   


 


Total Protein   


 


Albumin   


 


Lipase   














  11/29/19 11/29/19 11/29/19





  04:42 05:06 05:44


 


WBC   


 


MCHC   


 


Seg Neuts % (Manual)   


 


Lymphocytes % (Manual)   


 


Seg Neutrophils # Man   


 


Lymphocytes # (Manual)   


 


Monocytes # (Manual)   


 


D-Dimer   


 


POC ABG pH   


 


POC ABG pCO2   


 


POC ABG pO2   


 


VBG pH   


 


Sodium   


 


Potassium   3.0 L 


 


Chloride   108.2 H 


 


Carbon Dioxide   19 L 


 


BUN   7 L 


 


Creatinine   


 


Glucose   173 H 


 


POC Glucose  127 H   163 H


 


Hemoglobin A1c   


 


Lactic Acid   


 


Calcium   8.1 L 


 


Phosphorus   1.40 L 


 


Magnesium   1.50 L 


 


Alkaline Phosphatase   


 


NT-Pro-B Natriuret Pep   


 


Total Protein   


 


Albumin   


 


Lipase   














  11/29/19 11/29/19 11/29/19





  06:41 08:36 09:31


 


WBC   


 


MCHC   


 


Seg Neuts % (Manual)   


 


Lymphocytes % (Manual)   


 


Seg Neutrophils # Man   


 


Lymphocytes # (Manual)   


 


Monocytes # (Manual)   


 


D-Dimer   


 


POC ABG pH   


 


POC ABG pCO2   


 


POC ABG pO2   


 


VBG pH   


 


Sodium   


 


Potassium   


 


Chloride   


 


Carbon Dioxide   


 


BUN   


 


Creatinine   


 


Glucose   


 


POC Glucose  159 H  132 H  148 H


 


Hemoglobin A1c   


 


Lactic Acid   


 


Calcium   


 


Phosphorus   


 


Magnesium   


 


Alkaline Phosphatase   


 


NT-Pro-B Natriuret Pep   


 


Total Protein   


 


Albumin   


 


Lipase   














  11/29/19 11/29/19 11/29/19





  10:42 11:53 13:28


 


WBC   


 


MCHC   


 


Seg Neuts % (Manual)   


 


Lymphocytes % (Manual)   


 


Seg Neutrophils # Man   


 


Lymphocytes # (Manual)   


 


Monocytes # (Manual)   


 


D-Dimer   


 


POC ABG pH   


 


POC ABG pCO2   


 


POC ABG pO2   


 


VBG pH   


 


Sodium   


 


Potassium    3.2 L


 


Chloride   


 


Carbon Dioxide    14 L


 


BUN    7 L


 


Creatinine   


 


Glucose    256 H


 


POC Glucose  191 H  181 H 


 


Hemoglobin A1c   


 


Lactic Acid   


 


Calcium    8.0 L


 


Phosphorus   


 


Magnesium   


 


Alkaline Phosphatase   


 


NT-Pro-B Natriuret Pep   


 


Total Protein   


 


Albumin   


 


Lipase   














  11/29/19 11/29/19 11/30/19





  16:14 22:24 06:25


 


WBC   


 


MCHC   


 


Seg Neuts % (Manual)   


 


Lymphocytes % (Manual)   


 


Seg Neutrophils # Man   


 


Lymphocytes # (Manual)   


 


Monocytes # (Manual)   


 


D-Dimer   


 


POC ABG pH   


 


POC ABG pCO2   


 


POC ABG pO2   


 


VBG pH   


 


Sodium    148 H


 


Potassium    3.2 L


 


Chloride    116.3 H


 


Carbon Dioxide    17 L


 


BUN    7 L


 


Creatinine   


 


Glucose    192 H


 


POC Glucose  334 H  106 H 


 


Hemoglobin A1c   


 


Lactic Acid   


 


Calcium    8.3 L


 


Phosphorus   


 


Magnesium   


 


Alkaline Phosphatase   


 


NT-Pro-B Natriuret Pep   


 


Total Protein   


 


Albumin   


 


Lipase   














  11/30/19 11/30/19





  07:38 12:48


 


WBC  


 


MCHC  


 


Seg Neuts % (Manual)  


 


Lymphocytes % (Manual)  


 


Seg Neutrophils # Man  


 


Lymphocytes # (Manual)  


 


Monocytes # (Manual)  


 


D-Dimer  


 


POC ABG pH  


 


POC ABG pCO2  


 


POC ABG pO2  


 


VBG pH  


 


Sodium  


 


Potassium  


 


Chloride  


 


Carbon Dioxide  


 


BUN  


 


Creatinine  


 


Glucose  


 


POC Glucose  200 H  156 H


 


Hemoglobin A1c  


 


Lactic Acid  


 


Calcium  


 


Phosphorus  


 


Magnesium  


 


Alkaline Phosphatase  


 


NT-Pro-B Natriuret Pep  


 


Total Protein  


 


Albumin  


 


Lipase  











Allied health notes reviewed: nursing

## 2019-12-01 LAB
BASOPHILS # (AUTO): 0 K/MM3 (ref 0–0.1)
BASOPHILS NFR BLD AUTO: 0.4 % (ref 0–1.8)
BUN SERPL-MCNC: 5 MG/DL (ref 9–20)
BUN/CREAT SERPL: 5 %
CALCIUM SERPL-MCNC: 8.1 MG/DL (ref 8.4–10.2)
EOSINOPHIL # BLD AUTO: 0.4 K/MM3 (ref 0–0.4)
EOSINOPHIL NFR BLD AUTO: 4.7 % (ref 0–4.3)
HCT VFR BLD CALC: 33.7 % (ref 35.5–45.6)
HEMOLYSIS INDEX: 6
HGB BLD-MCNC: 11.2 GM/DL (ref 11.8–15.2)
LYMPHOCYTES # BLD AUTO: 1.3 K/MM3 (ref 1.2–5.4)
LYMPHOCYTES NFR BLD AUTO: 14 % (ref 13.4–35)
MCHC RBC AUTO-ENTMCNC: 33 % (ref 32–34)
MCV RBC AUTO: 84 FL (ref 84–94)
MONOCYTES # (AUTO): 0.5 K/MM3 (ref 0–0.8)
MONOCYTES % (AUTO): 5.2 % (ref 0–7.3)
PLATELET # BLD: 58 K/MM3 (ref 140–440)
RBC # BLD AUTO: 4 M/MM3 (ref 3.65–5.03)

## 2019-12-01 PROCEDURE — 5A09357 ASSISTANCE WITH RESPIRATORY VENTILATION, LESS THAN 24 CONSECUTIVE HOURS, CONTINUOUS POSITIVE AIRWAY PRESSURE: ICD-10-PCS | Performed by: HOSPITALIST

## 2019-12-01 RX ADMIN — POTASSIUM CHLORIDE SCH MLS/HR: 10 INJECTION, SOLUTION INTRAVENOUS at 11:30

## 2019-12-01 RX ADMIN — POTASSIUM CHLORIDE SCH MLS/HR: 10 INJECTION, SOLUTION INTRAVENOUS at 10:22

## 2019-12-01 RX ADMIN — ENOXAPARIN SODIUM SCH MG: 100 INJECTION SUBCUTANEOUS at 21:45

## 2019-12-01 RX ADMIN — INSULIN HUMAN SCH UNIT: 100 INJECTION, SUSPENSION SUBCUTANEOUS at 10:22

## 2019-12-01 RX ADMIN — METOPROLOL TARTRATE SCH MG: 25 TABLET, FILM COATED ORAL at 21:44

## 2019-12-01 RX ADMIN — INSULIN LISPRO SCH: 100 INJECTION, SOLUTION INTRAVENOUS; SUBCUTANEOUS at 16:11

## 2019-12-01 RX ADMIN — INSULIN HUMAN SCH UNIT: 100 INJECTION, SUSPENSION SUBCUTANEOUS at 16:12

## 2019-12-01 RX ADMIN — Medication SCH ML: at 10:20

## 2019-12-01 RX ADMIN — INSULIN LISPRO SCH: 100 INJECTION, SOLUTION INTRAVENOUS; SUBCUTANEOUS at 22:23

## 2019-12-01 RX ADMIN — METOPROLOL TARTRATE SCH MG: 25 TABLET, FILM COATED ORAL at 09:27

## 2019-12-01 RX ADMIN — POTASSIUM CHLORIDE SCH MLS/HR: 2 INJECTION, SOLUTION, CONCENTRATE INTRAVENOUS at 21:40

## 2019-12-01 RX ADMIN — FAMOTIDINE SCH MG: 20 TABLET ORAL at 09:27

## 2019-12-01 RX ADMIN — POTASSIUM CHLORIDE SCH MLS/HR: 2 INJECTION, SOLUTION, CONCENTRATE INTRAVENOUS at 07:48

## 2019-12-01 RX ADMIN — INSULIN LISPRO SCH: 100 INJECTION, SOLUTION INTRAVENOUS; SUBCUTANEOUS at 09:46

## 2019-12-01 RX ADMIN — CHOLESTYRAMINE SCH GM: 4 POWDER, FOR SUSPENSION ORAL at 10:00

## 2019-12-01 RX ADMIN — POTASSIUM CHLORIDE SCH MLS/HR: 10 INJECTION, SOLUTION INTRAVENOUS at 14:00

## 2019-12-01 RX ADMIN — AZITHROMYCIN SCH MLS/HR: 500 INJECTION, POWDER, LYOPHILIZED, FOR SOLUTION INTRAVENOUS at 10:17

## 2019-12-01 RX ADMIN — INSULIN LISPRO SCH UNIT: 100 INJECTION, SOLUTION INTRAVENOUS; SUBCUTANEOUS at 11:30

## 2019-12-01 RX ADMIN — INSULIN LISPRO SCH: 100 INJECTION, SOLUTION INTRAVENOUS; SUBCUTANEOUS at 08:24

## 2019-12-01 RX ADMIN — FAMOTIDINE SCH MG: 20 TABLET ORAL at 21:45

## 2019-12-01 RX ADMIN — LOPERAMIDE HYDROCHLORIDE PRN MG: 1 SOLUTION ORAL at 18:18

## 2019-12-01 RX ADMIN — Medication SCH: at 09:46

## 2019-12-01 RX ADMIN — POTASSIUM CHLORIDE SCH MLS/HR: 10 INJECTION, SOLUTION INTRAVENOUS at 12:30

## 2019-12-01 RX ADMIN — Medication SCH ML: at 21:46

## 2019-12-01 RX ADMIN — CHOLESTYRAMINE SCH GM: 4 POWDER, FOR SUSPENSION ORAL at 21:45

## 2019-12-01 RX ADMIN — DEXTROSE MONOHYDRATE PRN ML: 25 INJECTION, SOLUTION INTRAVENOUS at 22:14

## 2019-12-01 NOTE — XRAY REPORT
CHEST 1 VIEW 



INDICATION / CLINICAL INFORMATION:

pulmonary edema.



COMPARISON: 

Chest radiograph 11/28/2019



FINDINGS:



SUPPORT DEVICES: Interval placement of right-sided PICC with tip terminating in the mid SVC.



HEART / MEDIASTINUM: No significant abnormality. 



LUNGS / PLEURA: Redemonstration of bilateral airspace opacities, slightly improved compared with prio
r examination. No pleural effusion. No pneumothorax. 



ADDITIONAL FINDINGS: No significant additional findings.



IMPRESSION:

1. Bilateral airspace opacities slightly improved compared with prior study.



Signer Name: Camila Dan MD 

Signed: 12/1/2019 10:02 AM

 Workstation Name: [id number]

## 2019-12-01 NOTE — PROGRESS NOTE
Assessment and Plan








Acute Hypoxemic Respiratory Failure


Bilateral Pulmonary infiltrates (suspect Pulm edema over Pneumonia)


DKA (diabetic ketoacidosis)


severe metabolic acidosis


Severe dehydration


Sepsis


Hypokalemia


Severe Metabolic Acidosis


ARIANNA (acute kidney injury)





- repeat CXR with improved infiltrates


- continue  DKA protocol


- Change to qhs BIPAP with daytime Vapotherm system


- wean FiO2 to keep O2 sat's > 90%


- continue bronchodilators with pulmonary hygiene per RT


- continue to watch closely for N&V


- reduce bicarbonate drip rate as pH improved (stop shortly)


- continue hold on diuretics for now


- follow sputum C&S


- continue empiric Rocephin


- added empiric Zithromax for CAP coverage


- get Procalcitonin and lactate levels to aid clinical decision making


- prn ABG's


- consider ID consultation re: Leucocytosis also


- conservative volume management


- quick ACS w/up with cardiac enzymes, EKG & 2D ECHO


- continue GI & VTE prophylaxis with Famotidine and Enoxaparin


- continue other care per attending / other consuiltants





... re-evaluate in am & prn








The high probability of a clinically significant, sudden or life threatening 

deterioration of the [Endocrine, renal,  respiratory] system(s) required my full

and direct attention, intervention and personal management. The aggregate 

critical care time was [31] minutes. This time is in addition to time spent 

performing 


reported procedures but includes the following: 





[x] Data Review and interpretation 





[x] Patient assessment and monitoring of vital signs 





[x] Documentation 





[x] Medication orders and management











Subjective


Date of service: 12/01/19


Principal diagnosis: Ac Hypoxemic Resp Failure; Jesus. Pulm infiltrates; DKA; 

Sepsis


Interval history: 





Patient is seen today for: Acute Hypoxemic Respiratory Failure; Bilateral 

Pulmonary infiltrates (suspect Pulm edema over Pneumonia); DKA (diabetic 

ketoacidosis); severe metabolic acidosis


Sepsis Syndrome





Seen and examined at bedside; 24-hour events reviewed; nursing and respiratory 

care staff consulted; no adverse overnight events reported to me; resting in 

bed; remains on HFNC; no hemoptysis; improving





Objective


                               Vital Signs - 12hr











  12/01/19 12/01/19 12/01/19





  01:00 01:05 01:28


 


Temperature   


 


Pulse Rate 100 H 103 H 109 H


 


Pulse Rate [  106 H 





From Monitor]   


 


Respiratory 19 21 24





Rate   


 


Blood Pressure 100/68  100/68


 


O2 Sat by Pulse 100 97 98





Oximetry   














  12/01/19 12/01/19 12/01/19





  02:00 03:00 03:20


 


Temperature   


 


Pulse Rate 103 H 98 H 98 H


 


Pulse Rate [   86





From Monitor]   


 


Respiratory 23 19 21





Rate   


 


Blood Pressure 102/66 111/72 


 


O2 Sat by Pulse 99 99 97





Oximetry   














  12/01/19 12/01/19 12/01/19





  04:00 05:00 05:03


 


Temperature 98.9 F  


 


Pulse Rate 99 H 94 H 98 H


 


Pulse Rate [   





From Monitor]   


 


Respiratory 20 15 24





Rate   


 


Blood Pressure 130/86 103/76 103/76


 


O2 Sat by Pulse 100 100 99





Oximetry   














  12/01/19 12/01/19 12/01/19





  05:20 06:00 07:00


 


Temperature   


 


Pulse Rate 98 H 94 H 97 H


 


Pulse Rate [ 93 H  





From Monitor]   


 


Respiratory 15 15 14





Rate   


 


Blood Pressure  114/87 99/68


 


O2 Sat by Pulse 97 100 100





Oximetry   














  12/01/19 12/01/19 12/01/19





  07:44 09:27 10:29


 


Temperature   


 


Pulse Rate  110 H 


 


Pulse Rate [   





From Monitor]   


 


Respiratory   





Rate   


 


Blood Pressure  113/80 


 


O2 Sat by Pulse 95  95





Oximetry   














  12/01/19





  12:00


 


Temperature 97.7 F


 


Pulse Rate 


 


Pulse Rate [ 





From Monitor] 


 


Respiratory 





Rate 


 


Blood Pressure 


 


O2 Sat by Pulse 





Oximetry 











Constitutional: appears uncomfortable, other (Young male, normocephalic with 

mildly increased respiratory effort at rest)


Eyes: non-icteric


ENT: oropharynx moist, other (Mallampati 2)


Neck: supple, no JVD


Effort: mildly labored


Ascultation: Bilateral: diminished breath sounds, rales


Percussion: Bilateral: not dull


Cardiovascular: regular rate and rhythm, other (S1,S2, no murmurs)


Gastrointestinal: normoactive bowel sounds, soft, non-tender, non-distended


Integumentary: normal


Extremities: no cyanosis, no edema, pulses normal, no ischemia or petechiae


Neurologic: normal mental status, non-focal exam, pupils equal and round, CN II-

XII normal, motor strength normal and


Psychiatric: mood appropriate, anxious


CBC and BMP: 


                                 12/04/19 13:58





                                 12/05/19 06:21


ABG, PT/INR, D-dimer: 


ABG











POC ABG pH  7.453  (7.35-7.45)  H  11/28/19  21:30    


 


POC ABG pCO2  30.0  (35-45)  L  11/28/19  21:30    


 


POC ABG HCO3  21.0  (22-26 mml/L)   11/28/19  21:30    


 


POC ABG Total CO2  22  (23-27mmol/L)   11/28/19  21:30    


 


POC ABG O2 Sat  76   11/28/19  21:30    





PT/INR, D-dimer











D-Dimer  5088.17 ng/mlDDU (0-234)  H  11/28/19  11:47    








Abnormal lab findings: 


                                  Abnormal Labs











  11/26/19 11/26/19 11/26/19





  18:43 18:43 18:43


 


WBC   


 


Hgb   


 


Hct   


 


MCHC   


 


Plt Count   


 


Eos % (Auto)   


 


Seg Neutrophils %   


 


Seg Neuts % (Manual)   


 


Lymphocytes % (Manual)   


 


Seg Neutrophils # Man   


 


Lymphocytes # (Manual)   


 


Monocytes # (Manual)   


 


D-Dimer   


 


POC ABG pH   


 


POC ABG pCO2   


 


POC ABG pO2   


 


VBG pH   


 


Sodium   134 L 


 


Potassium   


 


Chloride   92.4 L 


 


Carbon Dioxide   3 L* 


 


BUN   


 


Creatinine   1.6 H 


 


Glucose   581 H* 


 


POC Glucose   


 


Hemoglobin A1c   


 


Lactic Acid   


 


Calcium   


 


Phosphorus  5.50 H  


 


Magnesium   


 


Alkaline Phosphatase    166 H


 


NT-Pro-B Natriuret Pep   


 


Total Protein   


 


Albumin   


 


Lipase   














  11/26/19 11/26/19 11/26/19





  18:43 18:43 19:04


 


WBC   


 


Hgb   


 


Hct   


 


MCHC   


 


Plt Count   


 


Eos % (Auto)   


 


Seg Neutrophils %   


 


Seg Neuts % (Manual)   


 


Lymphocytes % (Manual)   


 


Seg Neutrophils # Man   


 


Lymphocytes # (Manual)   


 


Monocytes # (Manual)   


 


D-Dimer   


 


POC ABG pH   


 


POC ABG pCO2   


 


POC ABG pO2   


 


VBG pH  6.993 L*  


 


Sodium   


 


Potassium   


 


Chloride   


 


Carbon Dioxide   


 


BUN   


 


Creatinine   


 


Glucose   


 


POC Glucose    437 H


 


Hemoglobin A1c   


 


Lactic Acid   


 


Calcium   


 


Phosphorus   


 


Magnesium   


 


Alkaline Phosphatase   


 


NT-Pro-B Natriuret Pep   


 


Total Protein   


 


Albumin   


 


Lipase   64 H 














  11/26/19 11/26/19 11/26/19





  19:40 20:14 20:45


 


WBC   24.8 H 


 


Hgb   


 


Hct   


 


MCHC   31 L 


 


Plt Count   


 


Eos % (Auto)   


 


Seg Neutrophils %   


 


Seg Neuts % (Manual)   89.0 H 


 


Lymphocytes % (Manual)   7.0 L 


 


Seg Neutrophils # Man   22.1 H 


 


Lymphocytes # (Manual)   


 


Monocytes # (Manual)   1.0 H 


 


D-Dimer   


 


POC ABG pH   


 


POC ABG pCO2   


 


POC ABG pO2   


 


VBG pH   


 


Sodium  135 L  


 


Potassium   


 


Chloride  94.4 L  


 


Carbon Dioxide  2 L*  


 


BUN   


 


Creatinine  1.6 H  


 


Glucose  536 H*  


 


POC Glucose    411 H


 


Hemoglobin A1c   


 


Lactic Acid   


 


Calcium   


 


Phosphorus  5.10 H  


 


Magnesium   


 


Alkaline Phosphatase   


 


NT-Pro-B Natriuret Pep   


 


Total Protein   


 


Albumin   


 


Lipase   














  11/26/19 11/26/19 11/26/19





  21:25 21:25 21:59


 


WBC   


 


Hgb   


 


Hct   


 


MCHC   


 


Plt Count   


 


Eos % (Auto)   


 


Seg Neutrophils %   


 


Seg Neuts % (Manual)   


 


Lymphocytes % (Manual)   


 


Seg Neutrophils # Man   


 


Lymphocytes # (Manual)   


 


Monocytes # (Manual)   


 


D-Dimer   


 


POC ABG pH   


 


POC ABG pCO2   


 


POC ABG pO2   


 


VBG pH   


 


Sodium   


 


Potassium  3.5 L  


 


Chloride   


 


Carbon Dioxide  5 L*  


 


BUN   


 


Creatinine   


 


Glucose  370 H  


 


POC Glucose    289 H


 


Hemoglobin A1c   


 


Lactic Acid   2.80 H* 


 


Calcium  8.2 L  


 


Phosphorus   


 


Magnesium   


 


Alkaline Phosphatase   


 


NT-Pro-B Natriuret Pep   


 


Total Protein   


 


Albumin   


 


Lipase   














  11/26/19 11/27/19 11/27/19





  23:16 00:32 00:37


 


WBC   


 


Hgb   


 


Hct   


 


MCHC   


 


Plt Count   


 


Eos % (Auto)   


 


Seg Neutrophils %   


 


Seg Neuts % (Manual)   


 


Lymphocytes % (Manual)   


 


Seg Neutrophils # Man   


 


Lymphocytes # (Manual)   


 


Monocytes # (Manual)   


 


D-Dimer   


 


POC ABG pH   


 


POC ABG pCO2   


 


POC ABG pO2   


 


VBG pH   


 


Sodium    148 H


 


Potassium    3.0 L


 


Chloride    116.1 H


 


Carbon Dioxide    8 L*


 


BUN   


 


Creatinine   


 


Glucose    135 H


 


POC Glucose  192 H  127 H 


 


Hemoglobin A1c   


 


Lactic Acid   


 


Calcium    7.1 L


 


Phosphorus   


 


Magnesium   


 


Alkaline Phosphatase   


 


NT-Pro-B Natriuret Pep   


 


Total Protein   


 


Albumin   


 


Lipase   














  11/27/19 11/27/19 11/27/19





  01:17 02:17 03:27


 


WBC   


 


Hgb   


 


Hct   


 


MCHC   


 


Plt Count   


 


Eos % (Auto)   


 


Seg Neutrophils %   


 


Seg Neuts % (Manual)   


 


Lymphocytes % (Manual)   


 


Seg Neutrophils # Man   


 


Lymphocytes # (Manual)   


 


Monocytes # (Manual)   


 


D-Dimer   


 


POC ABG pH   


 


POC ABG pCO2   


 


POC ABG pO2   


 


VBG pH   


 


Sodium   


 


Potassium   


 


Chloride   


 


Carbon Dioxide   


 


BUN   


 


Creatinine   


 


Glucose   


 


POC Glucose  135 H  166 H  254 H


 


Hemoglobin A1c   


 


Lactic Acid   


 


Calcium   


 


Phosphorus   


 


Magnesium   


 


Alkaline Phosphatase   


 


NT-Pro-B Natriuret Pep   


 


Total Protein   


 


Albumin   


 


Lipase   














  11/27/19 11/27/19 11/27/19





  04:13 04:34 05:41


 


WBC   


 


Hgb   


 


Hct   


 


MCHC   


 


Plt Count   


 


Eos % (Auto)   


 


Seg Neutrophils %   


 


Seg Neuts % (Manual)   


 


Lymphocytes % (Manual)   


 


Seg Neutrophils # Man   


 


Lymphocytes # (Manual)   


 


Monocytes # (Manual)   


 


D-Dimer   


 


POC ABG pH   


 


POC ABG pCO2   


 


POC ABG pO2   


 


VBG pH   


 


Sodium  147 H  


 


Potassium  3.3 L  


 


Chloride  113.4 H  


 


Carbon Dioxide  7 L*  


 


BUN   


 


Creatinine   


 


Glucose  320 H  


 


POC Glucose   320 H  333 H


 


Hemoglobin A1c   


 


Lactic Acid   


 


Calcium  7.5 L  


 


Phosphorus   


 


Magnesium   


 


Alkaline Phosphatase   


 


NT-Pro-B Natriuret Pep   


 


Total Protein   


 


Albumin   


 


Lipase   














  11/27/19 11/27/19 11/27/19





  06:20 07:27 07:53


 


WBC   


 


Hgb   


 


Hct   


 


MCHC   


 


Plt Count   


 


Eos % (Auto)   


 


Seg Neutrophils %   


 


Seg Neuts % (Manual)   


 


Lymphocytes % (Manual)   


 


Seg Neutrophils # Man   


 


Lymphocytes # (Manual)   


 


Monocytes # (Manual)   


 


D-Dimer   


 


POC ABG pH   


 


POC ABG pCO2   


 


POC ABG pO2   


 


VBG pH   


 


Sodium   


 


Potassium   


 


Chloride   


 


Carbon Dioxide   


 


BUN   


 


Creatinine   


 


Glucose   


 


POC Glucose  363 H  343 H  319 H


 


Hemoglobin A1c   


 


Lactic Acid   


 


Calcium   


 


Phosphorus   


 


Magnesium   


 


Alkaline Phosphatase   


 


NT-Pro-B Natriuret Pep   


 


Total Protein   


 


Albumin   


 


Lipase   














  11/27/19 11/27/19 11/27/19





  08:46 09:45 10:03


 


WBC   


 


Hgb   


 


Hct   


 


MCHC   


 


Plt Count   


 


Eos % (Auto)   


 


Seg Neutrophils %   


 


Seg Neuts % (Manual)   


 


Lymphocytes % (Manual)   


 


Seg Neutrophils # Man   


 


Lymphocytes # (Manual)   


 


Monocytes # (Manual)   


 


D-Dimer   


 


POC ABG pH   


 


POC ABG pCO2   


 


POC ABG pO2   


 


VBG pH   


 


Sodium    148 H


 


Potassium    3.1 L


 


Chloride    114.1 H


 


Carbon Dioxide    4 L*


 


BUN   


 


Creatinine   


 


Glucose    364 H


 


POC Glucose  402 H  340 H 


 


Hemoglobin A1c   


 


Lactic Acid   


 


Calcium    8.3 L


 


Phosphorus   


 


Magnesium   


 


Alkaline Phosphatase   


 


NT-Pro-B Natriuret Pep   


 


Total Protein   


 


Albumin   


 


Lipase   














  11/27/19 11/27/19 11/27/19





  10:42 11:26 12:53


 


WBC   


 


Hgb   


 


Hct   


 


MCHC   


 


Plt Count   


 


Eos % (Auto)   


 


Seg Neutrophils %   


 


Seg Neuts % (Manual)   


 


Lymphocytes % (Manual)   


 


Seg Neutrophils # Man   


 


Lymphocytes # (Manual)   


 


Monocytes # (Manual)   


 


D-Dimer   


 


POC ABG pH   


 


POC ABG pCO2   


 


POC ABG pO2   


 


VBG pH   


 


Sodium   


 


Potassium   


 


Chloride   


 


Carbon Dioxide   


 


BUN   


 


Creatinine   


 


Glucose   


 


POC Glucose  321 H  241 H  175 H


 


Hemoglobin A1c   


 


Lactic Acid   


 


Calcium   


 


Phosphorus   


 


Magnesium   


 


Alkaline Phosphatase   


 


NT-Pro-B Natriuret Pep   


 


Total Protein   


 


Albumin   


 


Lipase   














  11/27/19 11/27/19 11/27/19





  13:36 13:47 17:36


 


WBC   


 


Hgb   


 


Hct   


 


MCHC   


 


Plt Count   


 


Eos % (Auto)   


 


Seg Neutrophils %   


 


Seg Neuts % (Manual)   


 


Lymphocytes % (Manual)   


 


Seg Neutrophils # Man   


 


Lymphocytes # (Manual)   


 


Monocytes # (Manual)   


 


D-Dimer   


 


POC ABG pH   


 


POC ABG pCO2   


 


POC ABG pO2   


 


VBG pH   


 


Sodium  151 H  


 


Potassium  3.1 L  


 


Chloride  126.5 H  


 


Carbon Dioxide  12 L D  


 


BUN   


 


Creatinine   


 


Glucose  131 H  


 


POC Glucose   120 H  107 H


 


Hemoglobin A1c   


 


Lactic Acid   


 


Calcium  8.2 L  


 


Phosphorus   


 


Magnesium   


 


Alkaline Phosphatase   


 


NT-Pro-B Natriuret Pep   


 


Total Protein   


 


Albumin   


 


Lipase   














  11/27/19 11/27/19 11/27/19





  18:59 20:32 22:05


 


WBC   


 


Hgb   


 


Hct   


 


MCHC   


 


Plt Count   


 


Eos % (Auto)   


 


Seg Neutrophils %   


 


Seg Neuts % (Manual)   


 


Lymphocytes % (Manual)   


 


Seg Neutrophils # Man   


 


Lymphocytes # (Manual)   


 


Monocytes # (Manual)   


 


D-Dimer   


 


POC ABG pH   


 


POC ABG pCO2   


 


POC ABG pO2   


 


VBG pH   


 


Sodium   146 H 


 


Potassium   


 


Chloride   116.4 H 


 


Carbon Dioxide   10 L 


 


BUN   


 


Creatinine   


 


Glucose   267 H 


 


POC Glucose  153 H   336 H


 


Hemoglobin A1c   


 


Lactic Acid   


 


Calcium   


 


Phosphorus   


 


Magnesium   


 


Alkaline Phosphatase   


 


NT-Pro-B Natriuret Pep   


 


Total Protein   


 


Albumin   


 


Lipase   














  11/28/19 11/28/19 11/28/19





  05:00 07:47 11:21


 


WBC   


 


Hgb   


 


Hct   


 


MCHC   


 


Plt Count   


 


Eos % (Auto)   


 


Seg Neutrophils %   


 


Seg Neuts % (Manual)   


 


Lymphocytes % (Manual)   


 


Seg Neutrophils # Man   


 


Lymphocytes # (Manual)   


 


Monocytes # (Manual)   


 


D-Dimer   


 


POC ABG pH   


 


POC ABG pCO2   


 


POC ABG pO2   


 


VBG pH   


 


Sodium   


 


Potassium  3.3 L  


 


Chloride  111.1 H  


 


Carbon Dioxide  7 L*  


 


BUN  7 L  


 


Creatinine   


 


Glucose  290 H  


 


POC Glucose   328 H  390 H


 


Hemoglobin A1c   


 


Lactic Acid   


 


Calcium   


 


Phosphorus  1.30 L D  


 


Magnesium   


 


Alkaline Phosphatase   


 


NT-Pro-B Natriuret Pep   


 


Total Protein   


 


Albumin   


 


Lipase   














  11/28/19 11/28/19 11/28/19





  11:47 11:47 11:54


 


WBC   


 


Hgb   


 


Hct   


 


MCHC   


 


Plt Count   


 


Eos % (Auto)   


 


Seg Neutrophils %   


 


Seg Neuts % (Manual)   


 


Lymphocytes % (Manual)   


 


Seg Neutrophils # Man   


 


Lymphocytes # (Manual)   


 


Monocytes # (Manual)   


 


D-Dimer   5088.17 H 


 


POC ABG pH   


 


POC ABG pCO2   


 


POC ABG pO2   


 


VBG pH   


 


Sodium   


 


Potassium   


 


Chloride   


 


Carbon Dioxide  5 L*  


 


BUN  7 L  


 


Creatinine   


 


Glucose   


 


POC Glucose   


 


Hemoglobin A1c    15.1 H


 


Lactic Acid   


 


Calcium   


 


Phosphorus   


 


Magnesium   


 


Alkaline Phosphatase  150 H  


 


NT-Pro-B Natriuret Pep   


 


Total Protein  5.6 L D  


 


Albumin  3.4 L  


 


Lipase   














  11/28/19 11/28/19 11/28/19





  11:54 13:04 14:00


 


WBC  17.7 H  


 


Hgb   


 


Hct   


 


MCHC   


 


Plt Count   


 


Eos % (Auto)   


 


Seg Neutrophils %   


 


Seg Neuts % (Manual)   


 


Lymphocytes % (Manual)  4.0 L  


 


Seg Neutrophils # Man  12.2 H  


 


Lymphocytes # (Manual)  0.7 L  


 


Monocytes # (Manual)   


 


D-Dimer   


 


POC ABG pH   7.076 L 


 


POC ABG pCO2   27.9 L 


 


POC ABG pO2   71 L 


 


VBG pH   


 


Sodium   


 


Potassium   


 


Chloride   


 


Carbon Dioxide   


 


BUN   


 


Creatinine   


 


Glucose   


 


POC Glucose    371 H


 


Hemoglobin A1c   


 


Lactic Acid   


 


Calcium   


 


Phosphorus   


 


Magnesium   


 


Alkaline Phosphatase   


 


NT-Pro-B Natriuret Pep   


 


Total Protein   


 


Albumin   


 


Lipase   














  11/28/19 11/28/19 11/28/19





  15:13 16:09 17:13


 


WBC   


 


Hgb   


 


Hct   


 


MCHC   


 


Plt Count   


 


Eos % (Auto)   


 


Seg Neutrophils %   


 


Seg Neuts % (Manual)   


 


Lymphocytes % (Manual)   


 


Seg Neutrophils # Man   


 


Lymphocytes # (Manual)   


 


Monocytes # (Manual)   


 


D-Dimer   


 


POC ABG pH   


 


POC ABG pCO2   


 


POC ABG pO2   


 


VBG pH   


 


Sodium   


 


Potassium   


 


Chloride   


 


Carbon Dioxide   


 


BUN   


 


Creatinine   


 


Glucose   


 


POC Glucose  259 H  186 H  138 H


 


Hemoglobin A1c   


 


Lactic Acid   


 


Calcium   


 


Phosphorus   


 


Magnesium   


 


Alkaline Phosphatase   


 


NT-Pro-B Natriuret Pep   


 


Total Protein   


 


Albumin   


 


Lipase   














  11/28/19 11/28/19 11/28/19





  18:12 18:43 18:43


 


WBC   


 


Hgb   


 


Hct   


 


MCHC   


 


Plt Count   


 


Eos % (Auto)   


 


Seg Neutrophils %   


 


Seg Neuts % (Manual)   


 


Lymphocytes % (Manual)   


 


Seg Neutrophils # Man   


 


Lymphocytes # (Manual)   


 


Monocytes # (Manual)   


 


D-Dimer   


 


POC ABG pH   


 


POC ABG pCO2   


 


POC ABG pO2   


 


VBG pH   


 


Sodium   


 


Potassium    3.0 L


 


Chloride   


 


Carbon Dioxide    15 L D


 


BUN    7 L


 


Creatinine   


 


Glucose    132 H


 


POC Glucose  121 H  


 


Hemoglobin A1c   


 


Lactic Acid   


 


Calcium    8.3 L


 


Phosphorus   1.70 L D 


 


Magnesium   1.50 L 


 


Alkaline Phosphatase   


 


NT-Pro-B Natriuret Pep   


 


Total Protein   


 


Albumin   


 


Lipase   














  11/28/19 11/28/19 11/28/19





  18:43 18:43 19:20


 


WBC   


 


Hgb   


 


Hct   


 


MCHC   


 


Plt Count   


 


Eos % (Auto)   


 


Seg Neutrophils %   


 


Seg Neuts % (Manual)   


 


Lymphocytes % (Manual)   


 


Seg Neutrophils # Man   


 


Lymphocytes # (Manual)   


 


Monocytes # (Manual)   


 


D-Dimer   


 


POC ABG pH   


 


POC ABG pCO2   


 


POC ABG pO2   


 


VBG pH   


 


Sodium   


 


Potassium   


 


Chloride   


 


Carbon Dioxide   


 


BUN   


 


Creatinine   


 


Glucose   


 


POC Glucose    120 H


 


Hemoglobin A1c   


 


Lactic Acid  4.00 H*  


 


Calcium   


 


Phosphorus   


 


Magnesium   


 


Alkaline Phosphatase   


 


NT-Pro-B Natriuret Pep   1292 H 


 


Total Protein   


 


Albumin   


 


Lipase   














  11/28/19 11/28/19 11/28/19





  20:55 21:30 21:33


 


WBC   


 


Hgb   


 


Hct   


 


MCHC   


 


Plt Count   


 


Eos % (Auto)   


 


Seg Neutrophils %   


 


Seg Neuts % (Manual)   


 


Lymphocytes % (Manual)   


 


Seg Neutrophils # Man   


 


Lymphocytes # (Manual)   


 


Monocytes # (Manual)   


 


D-Dimer   


 


POC ABG pH   7.453 H 


 


POC ABG pCO2  30.5 L  30.0 L 


 


POC ABG pO2   


 


VBG pH   


 


Sodium   


 


Potassium   


 


Chloride   


 


Carbon Dioxide   


 


BUN   


 


Creatinine   


 


Glucose   


 


POC Glucose    121 H


 


Hemoglobin A1c   


 


Lactic Acid   


 


Calcium   


 


Phosphorus   


 


Magnesium   


 


Alkaline Phosphatase   


 


NT-Pro-B Natriuret Pep   


 


Total Protein   


 


Albumin   


 


Lipase   














  11/28/19 11/28/19 11/28/19





  21:59 22:40 22:53


 


WBC   


 


Hgb   


 


Hct   


 


MCHC   


 


Plt Count   


 


Eos % (Auto)   


 


Seg Neutrophils %   


 


Seg Neuts % (Manual)   


 


Lymphocytes % (Manual)   


 


Seg Neutrophils # Man   


 


Lymphocytes # (Manual)   


 


Monocytes # (Manual)   


 


D-Dimer   


 


POC ABG pH   


 


POC ABG pCO2   


 


POC ABG pO2   


 


VBG pH   


 


Sodium   


 


Potassium  3.0 L   2.6 L*


 


Chloride  107.9 H  


 


Carbon Dioxide  19 L   21 L


 


BUN  7 L   7 L


 


Creatinine   


 


Glucose  138 H   148 H


 


POC Glucose   137 H 


 


Hemoglobin A1c   


 


Lactic Acid   


 


Calcium  7.9 L   7.9 L


 


Phosphorus   


 


Magnesium   


 


Alkaline Phosphatase   


 


NT-Pro-B Natriuret Pep   


 


Total Protein   


 


Albumin   


 


Lipase   














  11/28/19 11/29/19 11/29/19





  23:41 02:54 03:51


 


WBC   


 


Hgb   


 


Hct   


 


MCHC   


 


Plt Count   


 


Eos % (Auto)   


 


Seg Neutrophils %   


 


Seg Neuts % (Manual)   


 


Lymphocytes % (Manual)   


 


Seg Neutrophils # Man   


 


Lymphocytes # (Manual)   


 


Monocytes # (Manual)   


 


D-Dimer   


 


POC ABG pH   


 


POC ABG pCO2   


 


POC ABG pO2   


 


VBG pH   


 


Sodium   


 


Potassium   


 


Chloride   


 


Carbon Dioxide   


 


BUN   


 


Creatinine   


 


Glucose   


 


POC Glucose  115 H  143 H  126 H


 


Hemoglobin A1c   


 


Lactic Acid   


 


Calcium   


 


Phosphorus   


 


Magnesium   


 


Alkaline Phosphatase   


 


NT-Pro-B Natriuret Pep   


 


Total Protein   


 


Albumin   


 


Lipase   














  11/29/19 11/29/19 11/29/19





  04:42 05:06 05:44


 


WBC   


 


Hgb   


 


Hct   


 


MCHC   


 


Plt Count   


 


Eos % (Auto)   


 


Seg Neutrophils %   


 


Seg Neuts % (Manual)   


 


Lymphocytes % (Manual)   


 


Seg Neutrophils # Man   


 


Lymphocytes # (Manual)   


 


Monocytes # (Manual)   


 


D-Dimer   


 


POC ABG pH   


 


POC ABG pCO2   


 


POC ABG pO2   


 


VBG pH   


 


Sodium   


 


Potassium   3.0 L 


 


Chloride   108.2 H 


 


Carbon Dioxide   19 L 


 


BUN   7 L 


 


Creatinine   


 


Glucose   173 H 


 


POC Glucose  127 H   163 H


 


Hemoglobin A1c   


 


Lactic Acid   


 


Calcium   8.1 L 


 


Phosphorus   1.40 L 


 


Magnesium   1.50 L 


 


Alkaline Phosphatase   


 


NT-Pro-B Natriuret Pep   


 


Total Protein   


 


Albumin   


 


Lipase   














  11/29/19 11/29/19 11/29/19





  06:41 08:36 09:31


 


WBC   


 


Hgb   


 


Hct   


 


MCHC   


 


Plt Count   


 


Eos % (Auto)   


 


Seg Neutrophils %   


 


Seg Neuts % (Manual)   


 


Lymphocytes % (Manual)   


 


Seg Neutrophils # Man   


 


Lymphocytes # (Manual)   


 


Monocytes # (Manual)   


 


D-Dimer   


 


POC ABG pH   


 


POC ABG pCO2   


 


POC ABG pO2   


 


VBG pH   


 


Sodium   


 


Potassium   


 


Chloride   


 


Carbon Dioxide   


 


BUN   


 


Creatinine   


 


Glucose   


 


POC Glucose  159 H  132 H  148 H


 


Hemoglobin A1c   


 


Lactic Acid   


 


Calcium   


 


Phosphorus   


 


Magnesium   


 


Alkaline Phosphatase   


 


NT-Pro-B Natriuret Pep   


 


Total Protein   


 


Albumin   


 


Lipase   














  11/29/19 11/29/19 11/29/19





  10:42 11:53 13:28


 


WBC   


 


Hgb   


 


Hct   


 


MCHC   


 


Plt Count   


 


Eos % (Auto)   


 


Seg Neutrophils %   


 


Seg Neuts % (Manual)   


 


Lymphocytes % (Manual)   


 


Seg Neutrophils # Man   


 


Lymphocytes # (Manual)   


 


Monocytes # (Manual)   


 


D-Dimer   


 


POC ABG pH   


 


POC ABG pCO2   


 


POC ABG pO2   


 


VBG pH   


 


Sodium   


 


Potassium    3.2 L


 


Chloride   


 


Carbon Dioxide    14 L


 


BUN    7 L


 


Creatinine   


 


Glucose    256 H


 


POC Glucose  191 H  181 H 


 


Hemoglobin A1c   


 


Lactic Acid   


 


Calcium    8.0 L


 


Phosphorus   


 


Magnesium   


 


Alkaline Phosphatase   


 


NT-Pro-B Natriuret Pep   


 


Total Protein   


 


Albumin   


 


Lipase   














  11/29/19 11/29/19 11/30/19





  16:14 22:24 06:25


 


WBC   


 


Hgb   


 


Hct   


 


MCHC   


 


Plt Count   


 


Eos % (Auto)   


 


Seg Neutrophils %   


 


Seg Neuts % (Manual)   


 


Lymphocytes % (Manual)   


 


Seg Neutrophils # Man   


 


Lymphocytes # (Manual)   


 


Monocytes # (Manual)   


 


D-Dimer   


 


POC ABG pH   


 


POC ABG pCO2   


 


POC ABG pO2   


 


VBG pH   


 


Sodium    148 H


 


Potassium    3.2 L


 


Chloride    116.3 H


 


Carbon Dioxide    17 L


 


BUN    7 L


 


Creatinine   


 


Glucose    192 H


 


POC Glucose  334 H  106 H 


 


Hemoglobin A1c   


 


Lactic Acid   


 


Calcium    8.3 L


 


Phosphorus   


 


Magnesium   


 


Alkaline Phosphatase   


 


NT-Pro-B Natriuret Pep   


 


Total Protein   


 


Albumin   


 


Lipase   














  11/30/19 11/30/19 11/30/19





  07:38 12:48 16:24


 


WBC   


 


Hgb   


 


Hct   


 


MCHC   


 


Plt Count   


 


Eos % (Auto)   


 


Seg Neutrophils %   


 


Seg Neuts % (Manual)   


 


Lymphocytes % (Manual)   


 


Seg Neutrophils # Man   


 


Lymphocytes # (Manual)   


 


Monocytes # (Manual)   


 


D-Dimer   


 


POC ABG pH   


 


POC ABG pCO2   


 


POC ABG pO2   


 


VBG pH   


 


Sodium   


 


Potassium   


 


Chloride   


 


Carbon Dioxide   


 


BUN   


 


Creatinine   


 


Glucose   


 


POC Glucose  200 H  156 H  223 H


 


Hemoglobin A1c   


 


Lactic Acid   


 


Calcium   


 


Phosphorus   


 


Magnesium   


 


Alkaline Phosphatase   


 


NT-Pro-B Natriuret Pep   


 


Total Protein   


 


Albumin   


 


Lipase   














  11/30/19 12/01/19 12/01/19





  21:27 06:00 09:30


 


WBC   


 


Hgb    11.2 L


 


Hct    33.7 L D


 


MCHC   


 


Plt Count    58 L


 


Eos % (Auto)    4.7 H


 


Seg Neutrophils %    75.7 H


 


Seg Neuts % (Manual)   


 


Lymphocytes % (Manual)   


 


Seg Neutrophils # Man   


 


Lymphocytes # (Manual)   


 


Monocytes # (Manual)   


 


D-Dimer   


 


POC ABG pH   


 


POC ABG pCO2   


 


POC ABG pO2   


 


VBG pH   


 


Sodium   


 


Potassium   2.5 L* D 


 


Chloride   


 


Carbon Dioxide   19 L 


 


BUN   5 L 


 


Creatinine   


 


Glucose   42 L 


 


POC Glucose  219 H  


 


Hemoglobin A1c   


 


Lactic Acid   


 


Calcium   8.1 L 


 


Phosphorus   


 


Magnesium   


 


Alkaline Phosphatase   


 


NT-Pro-B Natriuret Pep   


 


Total Protein   


 


Albumin   


 


Lipase   














  12/01/19





  11:57


 


WBC 


 


Hgb 


 


Hct 


 


MCHC 


 


Plt Count 


 


Eos % (Auto) 


 


Seg Neutrophils % 


 


Seg Neuts % (Manual) 


 


Lymphocytes % (Manual) 


 


Seg Neutrophils # Man 


 


Lymphocytes # (Manual) 


 


Monocytes # (Manual) 


 


D-Dimer 


 


POC ABG pH 


 


POC ABG pCO2 


 


POC ABG pO2 


 


VBG pH 


 


Sodium 


 


Potassium 


 


Chloride 


 


Carbon Dioxide 


 


BUN 


 


Creatinine 


 


Glucose 


 


POC Glucose  237 H


 


Hemoglobin A1c 


 


Lactic Acid 


 


Calcium 


 


Phosphorus 


 


Magnesium 


 


Alkaline Phosphatase 


 


NT-Pro-B Natriuret Pep 


 


Total Protein 


 


Albumin 


 


Lipase 











Allied health notes reviewed: nursing

## 2019-12-02 LAB
BASOPHILS # (AUTO): 0 K/MM3 (ref 0–0.1)
BASOPHILS NFR BLD AUTO: 0.3 % (ref 0–1.8)
BUN SERPL-MCNC: 3 MG/DL (ref 9–20)
BUN/CREAT SERPL: 3 %
CALCIUM SERPL-MCNC: 8.1 MG/DL (ref 8.4–10.2)
EOSINOPHIL # BLD AUTO: 0.6 K/MM3 (ref 0–0.4)
EOSINOPHIL NFR BLD AUTO: 9 % (ref 0–4.3)
HCT VFR BLD CALC: 32.7 % (ref 35.5–45.6)
HEMOLYSIS INDEX: 11
HGB BLD-MCNC: 11.1 GM/DL (ref 11.8–15.2)
LYMPHOCYTES # BLD AUTO: 1.2 K/MM3 (ref 1.2–5.4)
LYMPHOCYTES NFR BLD AUTO: 16.1 % (ref 13.4–35)
MCHC RBC AUTO-ENTMCNC: 34 % (ref 32–34)
MCV RBC AUTO: 84 FL (ref 84–94)
MONOCYTES # (AUTO): 0.5 K/MM3 (ref 0–0.8)
MONOCYTES % (AUTO): 7.4 % (ref 0–7.3)
PLATELET # BLD: 38 K/MM3 (ref 140–440)
RBC # BLD AUTO: 3.9 M/MM3 (ref 3.65–5.03)

## 2019-12-02 PROCEDURE — 5A09357 ASSISTANCE WITH RESPIRATORY VENTILATION, LESS THAN 24 CONSECUTIVE HOURS, CONTINUOUS POSITIVE AIRWAY PRESSURE: ICD-10-PCS | Performed by: HOSPITALIST

## 2019-12-02 RX ADMIN — DEXTROSE MONOHYDRATE PRN ML: 25 INJECTION, SOLUTION INTRAVENOUS at 17:10

## 2019-12-02 RX ADMIN — METOPROLOL TARTRATE SCH MG: 25 TABLET, FILM COATED ORAL at 09:23

## 2019-12-02 RX ADMIN — POTASSIUM CHLORIDE SCH MLS/HR: 2 INJECTION, SOLUTION, CONCENTRATE INTRAVENOUS at 21:37

## 2019-12-02 RX ADMIN — POTASSIUM CHLORIDE SCH MLS/HR: 2 INJECTION, SOLUTION, CONCENTRATE INTRAVENOUS at 08:32

## 2019-12-02 RX ADMIN — POTASSIUM CHLORIDE SCH MLS/HR: 200 INJECTION, SOLUTION INTRAVENOUS at 17:10

## 2019-12-02 RX ADMIN — POTASSIUM CHLORIDE SCH MLS/HR: 200 INJECTION, SOLUTION INTRAVENOUS at 18:44

## 2019-12-02 RX ADMIN — INSULIN LISPRO SCH UNIT: 100 INJECTION, SOLUTION INTRAVENOUS; SUBCUTANEOUS at 22:08

## 2019-12-02 RX ADMIN — ZOLPIDEM TARTRATE PRN MG: 5 TABLET ORAL at 21:39

## 2019-12-02 RX ADMIN — INSULIN HUMAN SCH UNIT: 100 INJECTION, SUSPENSION SUBCUTANEOUS at 08:37

## 2019-12-02 RX ADMIN — Medication SCH ML: at 09:24

## 2019-12-02 RX ADMIN — INSULIN LISPRO SCH: 100 INJECTION, SOLUTION INTRAVENOUS; SUBCUTANEOUS at 17:09

## 2019-12-02 RX ADMIN — FAMOTIDINE SCH MG: 20 TABLET ORAL at 21:38

## 2019-12-02 RX ADMIN — CHOLESTYRAMINE SCH GM: 4 POWDER, FOR SUSPENSION ORAL at 09:23

## 2019-12-02 RX ADMIN — LOPERAMIDE HYDROCHLORIDE PRN MG: 1 SOLUTION ORAL at 15:01

## 2019-12-02 RX ADMIN — INSULIN LISPRO SCH: 100 INJECTION, SOLUTION INTRAVENOUS; SUBCUTANEOUS at 08:32

## 2019-12-02 RX ADMIN — INSULIN LISPRO SCH UNIT: 100 INJECTION, SOLUTION INTRAVENOUS; SUBCUTANEOUS at 12:05

## 2019-12-02 RX ADMIN — Medication SCH ML: at 21:42

## 2019-12-02 RX ADMIN — METOPROLOL TARTRATE SCH MG: 25 TABLET, FILM COATED ORAL at 21:40

## 2019-12-02 RX ADMIN — FAMOTIDINE SCH MG: 20 TABLET ORAL at 09:23

## 2019-12-02 RX ADMIN — INSULIN HUMAN SCH: 100 INJECTION, SUSPENSION SUBCUTANEOUS at 18:30

## 2019-12-02 RX ADMIN — DEXTROSE MONOHYDRATE PRN ML: 25 INJECTION, SOLUTION INTRAVENOUS at 01:18

## 2019-12-02 RX ADMIN — CHOLESTYRAMINE SCH GM: 4 POWDER, FOR SUSPENSION ORAL at 21:40

## 2019-12-02 NOTE — PROGRESS NOTE
Assessment and Plan


Assessment and plan: 


40 YO Male with DM, Gastritis presents to ED for evaluation. Pt states that he 

has experienced nausea, and multiple episodes of vomiting over the past 1 day 

with persistent symptoms over the same time frame. Pt transported to [facility] via 

private vehicle. Pt seen and evaluated in ED and found to have DKA, Sepsis, 

Acidosis, and ARIANNA. Pt found to be critically ill and admitted to ICU and 

initiated on DKA and Sepsis protocols. Pt denies fever, chills, CP, 

palpitations, Trauma, BRBPR, Productive cough, skin rash or recent ill contacts.

Pulmonary team consulted in ED.








Acute Hypoxemic Respiratory Failure


Bilateral Pulmonary infiltrates (suspect Pulm edema over Pneumonia)


DKA (diabetic ketoacidosis)


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


severe metabolic acidosis


Severe dehydration


Sepsis


Hypokalemia


Tachycardia


Chronic Diastolic Heart failure


Hypophostemia


Hypomagnesemia


Severe Protien calorie Malnutrition


ARIANNA (acute kidney injury) secondary to vasomotor nephropathy


Hypernatremia--RESOLVED








PLAN


Continues on High flow oxygen and improving,


Give K


Replace Electrolytes. With patients personal hx of Chronic Diarrhea, he will 

probably need daily supplementation


Case management consult. Patient reports he ran out of Insulin.


Continue Abx, and will discuss with Pulmonary if to descalate if repeat CBC is 

negative


Add Questran and imodium


Lasix Held


Continue 70/30, was a bit hypoglycemia, may decrease to 15 units


Follow cultures


Dietician consult


DVT/GI prophy


Aspiration Precautions. Patient reports unable to chew food properly, also 

asking for supplements. 








The high probability of a clinically significant, sudden or life threatening 

deterioration of the [GI, pulm, endocrine] system(s) required my full and direct

attention, intervention and personal management. The aggregate critical care 

time was [45] minutes. This time is in addition to time spent performing 

reported procedures but includes the following: 





[x] Data Review and interpretation 





[x] Patient assessment and monitoring of vital signs 





[x] Documentation 





[x] Medication orders and management























History


Interval history: 


Patient seen and examined, still with shortness of breath and chronic diarrhea, 

stool slowed. Pt denies fever, chills, CP, palpitations, Trauma, BRBPR, Pr

oductive cough, skin rash or recent ill contacts. 





Hospitalist Physical





- Physical exam


Narrative exam: 


General appearance: Present: mild distress, cachectic, 





- EENT


Eyes: Present: PERRL, EOM intact


ENT: hearing intact, clear oral mucosa





- Neck


Neck: Present: supple, normal ROM





- Respiratory


Respiratory effort: normal


Respiratory: bilateral: diminished





- Cardiovascular


Rhythm: regular


Heart Sounds: Present: S1 & S2.  Absent: systolic murmur, diastolic murmur





- Extremities


Extremities: no ischemia, pulses intact, pulses symmetrical


Peripheral Pulses: within normal limits





- Abdominal


General gastrointestinal: soft, non-tender, non-distended, normal bowel sounds





- Integumentary


Integumentary: Present: clear, warm, dry





- Psychiatric


Psychiatric: appropriate mood/affect





- Neurologic


Neurologic: CNII-XII intact, moves all extremities





- Allied Health


Allied health notes reviewed: nursing, social work








- Constitutional


Vitals: 


                                        











Temp Pulse Resp BP Pulse Ox


 


 97.9 F   107 H  17   123/81   99 


 


 12/02/19 16:00  12/02/19 17:00  12/02/19 17:00  12/02/19 17:00  12/02/19 17:00











General appearance: Present: mild distress, cachectic, other (lathergic)





Results





- Labs


CBC & Chem 7: 


                                12/02/19 Unknown





                                 12/02/19 07:49


Labs: 


                             Laboratory Last Values











WBC  7.1 K/mm3 (4.5-11.0)   12/02/19  Unknown


 


RBC  3.90 M/mm3 (3.65-5.03)   12/02/19  Unknown


 


Hgb  11.1 gm/dl (11.8-15.2)  L  12/02/19  Unknown


 


Hct  32.7 % (35.5-45.6)  L  12/02/19  Unknown


 


MCV  84 fl (84-94)   12/02/19  Unknown


 


MCH  28 pg (28-32)   12/02/19  Unknown


 


MCHC  34 % (32-34)   12/02/19  Unknown


 


RDW  14.6 % (13.2-15.2)   12/02/19  Unknown


 


Plt Count  38 K/mm3 (140-440)  L  12/02/19  Unknown


 


Lymph % (Auto)  16.1 % (13.4-35.0)   12/02/19  Unknown


 


Mono % (Auto)  7.4 % (0.0-7.3)  H  12/02/19  Unknown


 


Eos % (Auto)  9.0 % (0.0-4.3)  H  12/02/19  Unknown


 


Baso % (Auto)  0.3 % (0.0-1.8)   12/02/19  Unknown


 


Lymph #  1.2 K/mm3 (1.2-5.4)   12/02/19  Unknown


 


Mono #  0.5 K/mm3 (0.0-0.8)   12/02/19  Unknown


 


Eos #  0.6 K/mm3 (0.0-0.4)  H  12/02/19  Unknown


 


Baso #  0.0 K/mm3 (0.0-0.1)   12/02/19  Unknown


 


Add Manual Diff  Complete   11/28/19  11:54    


 


Total Counted  100   11/28/19  11:54    


 


Seg Neutrophils %  67.2 % (40.0-70.0)   12/02/19  Unknown


 


Seg Neuts % (Manual)  69.0 % (40.0-70.0)   11/28/19  11:54    


 


Band Neutrophils %  23.0 %  11/28/19  11:54    


 


Lymphocytes % (Manual)  4.0 % (13.4-35.0)  L  11/28/19  11:54    


 


Reactive Lymphs % (Man)  0 %  11/28/19  11:54    


 


Monocytes % (Manual)  0 % (0.0-7.3)   11/28/19  11:54    


 


Eosinophils % (Manual)  0 % (0.0-4.3)   11/28/19  11:54    


 


Basophils % (Manual)  0 % (0.0-1.8)   11/28/19  11:54    


 


Metamyelocytes %  4.0 %  11/28/19  11:54    


 


Myelocytes %  0 %  11/28/19  11:54    


 


Promyelocytes %  0 %  11/28/19  11:54    


 


Blast Cells %  0 %  11/28/19  11:54    


 


Nucleated RBC %  Not Reportable   11/28/19  11:54    


 


Seg Neutrophils #  4.8 K/mm3 (1.8-7.7)   12/02/19  Unknown


 


Seg Neutrophils # Man  12.2 K/mm3 (1.8-7.7)  H  11/28/19  11:54    


 


Band Neutrophils #  4.1 K/mm3  11/28/19  11:54    


 


Lymphocytes # (Manual)  0.7 K/mm3 (1.2-5.4)  L  11/28/19  11:54    


 


Abs React Lymphs (Man)  0.0 K/mm3  11/28/19  11:54    


 


Monocytes # (Manual)  0.0 K/mm3 (0.0-0.8)   11/28/19  11:54    


 


Eosinophils # (Manual)  0.0 K/mm3 (0.0-0.4)   11/28/19  11:54    


 


Basophils # (Manual)  0.0 K/mm3 (0.0-0.1)   11/28/19  11:54    


 


Metamyelocytes #  0.7 K/mm3  11/28/19  11:54    


 


Myelocytes #  0.0 K/mm3  11/28/19  11:54    


 


Promyelocytes #  0.0 K/mm3  11/28/19  11:54    


 


Blast Cells #  0.0 K/mm3  11/28/19  11:54    


 


WBC Morphology  Not Reportable   11/28/19  11:54    


 


Hypersegmented Neuts  Not Reportable   11/28/19  11:54    


 


Hyposegmented Neuts  Not Reportable   11/28/19  11:54    


 


Hypogranular Neuts  Not Reportable   11/28/19  11:54    


 


Smudge Cells  Not Reportable   11/28/19  11:54    


 


Toxic Granulation  1+   11/28/19  11:54    


 


Toxic Vacuolation  Few   11/28/19  11:54    


 


Dohle Bodies  Not Reportable   11/28/19  11:54    


 


Pelger-Huet Anomaly  Not Reportable   11/28/19  11:54    


 


Jose Rods  Not Reportable   11/28/19  11:54    


 


Platelet Estimate  Consistent w auto   11/28/19  11:54    


 


Clumped Platelets  Not Reportable   11/28/19  11:54    


 


Plt Clumps, EDTA  Not Reportable   11/28/19  11:54    


 


Large Platelets  Not Reportable   11/28/19  11:54    


 


Giant Platelets  Not Reportable   11/28/19  11:54    


 


Platelet Satelliting  Not Reportable   11/28/19  11:54    


 


Plt Morphology Comment  Not Reportable   11/28/19  11:54    


 


RBC Morphology  Normal   11/28/19  11:54    


 


Dimorphic RBCs  Not Reportable   11/28/19  11:54    


 


Polychromasia  Not Reportable   11/28/19  11:54    


 


Hypochromasia  Not Reportable   11/28/19  11:54    


 


Poikilocytosis  Not Reportable   11/28/19  11:54    


 


Anisocytosis  Not Reportable   11/28/19  11:54    


 


Microcytosis  Not Reportable   11/28/19  11:54    


 


Macrocytosis  Not Reportable   11/28/19  11:54    


 


Spherocytes  Not Reportable   11/28/19  11:54    


 


Pappenheimer Bodies  Not Reportable   11/28/19  11:54    


 


Sickle Cells  Not Reportable   11/28/19  11:54    


 


Target Cells  Not Reportable   11/28/19  11:54    


 


Tear Drop Cells  Not Reportable   11/28/19  11:54    


 


Ovalocytes  Not Reportable   11/28/19  11:54    


 


Helmet Cells  Not Reportable   11/28/19  11:54    


 


Al-[place] Bodies  Not Reportable   11/28/19  11:54    


 


Cabot Rings  Not Reportable   11/28/19  11:54    


 


Clare Cells  Not Reportable   11/28/19  11:54    


 


Bite Cells  Not Reportable   11/28/19  11:54    


 


Crenated Cell  Not Reportable   11/28/19  11:54    


 


Elliptocytes  Not Reportable   11/28/19  11:54    


 


Acanthocytes (Spur)  Not Reportable   11/28/19  11:54    


 


Rouleaux  Not Reportable   11/28/19  11:54    


 


Hemoglobin C Crystals  Not Reportable   11/28/19  11:54    


 


Schistocytes  Not Reportable   11/28/19  11:54    


 


Malaria parasites  Not Reportable   11/28/19  11:54    


 


Davide Bodies  Not Reportable   11/28/19  11:54    


 


Hem Pathologist Commnt  No   11/28/19  11:54    


 


D-Dimer  5088.17 ng/mlDDU (0-234)  H  11/28/19  11:47    


 


POC ABG pH  7.453  (7.35-7.45)  H  11/28/19  21:30    


 


POC ABG pCO2  30.0  (35-45)  L  11/28/19  21:30    


 


POC ABG pO2  < 50  ()  L  11/28/19  21:30    


 


POC ABG HCO3  21.0  (22-26 mml/L)   11/28/19  21:30    


 


POC ABG Total CO2  22  (23-27mmol/L)   11/28/19  21:30    


 


POC ABG O2 Sat  76   11/28/19  21:30    


 


POC ABG Base Excess  -3  ((-2) - (+3)mmol/L)   11/28/19  21:30    


 


VBG pH  6.993  (7.320-7.420)  L*  11/26/19  18:43    


 


FiO2  70 %  11/28/19  21:30    


 


Sodium  141 mmol/L (137-145)   12/02/19  07:49    


 


Potassium  2.9 mmol/L (3.6-5.0)  L*  12/02/19  07:49    


 


Chloride  105.0 mmol/L ()   12/02/19  07:49    


 


Carbon Dioxide  20 mmol/L (22-30)  L  12/02/19  07:49    


 


Anion Gap  19 mmol/L  12/02/19  07:49    


 


BUN  3 mg/dL (9-20)  L  12/02/19  07:49    


 


Creatinine  1.0 mg/dL (0.8-1.5)   12/02/19  07:49    


 


Estimated GFR  > 60 ml/min  12/02/19  07:49    


 


BUN/Creatinine Ratio  3 %  12/02/19  07:49    


 


Glucose  188 mg/dL ()  H  12/02/19  07:49    


 


POC Glucose  111  ()  H  12/02/19  18:08    


 


Hemoglobin A1c  15.1 % (4-6)  H  11/28/19  11:54    


 


Lactic Acid  1.60 mmol/L (0.7-2.0)   11/28/19  22:53    


 


Calcium  8.1 mg/dL (8.4-10.2)  L  12/02/19  07:49    


 


Phosphorus  3.20 mg/dL (2.5-4.5)  D 11/30/19  06:25    


 


Magnesium  2.10 mg/dL (1.7-2.3)   11/30/19  06:25    


 


Total Bilirubin  0.20 mg/dL (0.1-1.2)   11/28/19  11:47    


 


Direct Bilirubin  < 0.2 mg/dL (0-0.2)   11/26/19  18:43    


 


Indirect Bilirubin  0.0 mg/dL  11/26/19  18:43    


 


AST  39 units/L (5-40)   11/28/19  11:47    


 


ALT  11 units/L (7-56)   11/28/19  11:47    


 


Alkaline Phosphatase  150 units/L ()  H  11/28/19  11:47    


 


Total Creatine Kinase  140 units/L ()   11/28/19  18:43    


 


CK-MB (CK-2)  3.5 ng/mL (0.0-4.0)   11/28/19  18:43    


 


CK-MB (CK-2) Rel Index  2.5  (0-4)   11/28/19  18:43    


 


Troponin T  < 0.010 ng/mL (0.00-0.029)   11/28/19  18:43    


 


NT-Pro-B Natriuret Pep  1292 pg/mL (0-450)  H  11/28/19  18:43    


 


Total Protein  5.6 g/dL (6.3-8.2)  L D 11/28/19  11:47    


 


Albumin  3.4 g/dL (3.9-5)  L  11/28/19  11:47    


 


Albumin/Globulin Ratio  1.5 %  11/28/19  11:47    


 


Lipase  64 units/L (13-60)  H  11/26/19  18:43    


 


Urine Color  Straw  (Yellow)   11/26/19  21:29    


 


Urine Turbidity  Clear  (Clear)   11/26/19  21:29    


 


Urine pH  5.0  (5.0-7.0)   11/26/19  21:29    


 


Ur Specific Gravity  1.013  (1.003-1.030)   11/26/19  21:29    


 


Urine Protein  100 mg/dl mg/dL (Negative)   11/26/19  21:29    


 


Urine Glucose (UA)  >=500 mg/dL (Negative)   11/26/19  21:29    


 


Urine Ketones  80 mg/dL (Negative)   11/26/19  21:29    


 


Urine Blood  Sm  (Negative)   11/26/19  21:29    


 


Urine Nitrite  Neg  (Negative)   11/26/19  21:29    


 


Urine Bilirubin  Neg  (Negative)   11/26/19  21:29    


 


Urine Urobilinogen  < 2.0 mg/dL (<2.0)   11/26/19  21:29    


 


Ur Leukocyte Esterase  Neg  (Negative)   11/26/19  21:29    


 


Urine WBC (Auto)  1.0 /HPF (0.0-6.0)   11/26/19  21:29    


 


Urine RBC (Auto)  3.0 /HPF (0.0-6.0)   11/26/19  21:29    


 


U Epithel Cells (Auto)  1.0 /HPF (0-13.0)   11/26/19  21:29    


 


Urine Bacteria (Auto)  1+ /HPF (Negative)   11/26/19  21:29    


 


Urine Mucus  Few /HPF  11/26/19  21:29    


 


Urine Opiates Screen  Presumptive negative   11/29/19  18:20    


 


Urine Methadone Screen  Presumptive negative   11/29/19  18:20    


 


Ur Barbiturates Screen  Presumptive negative   11/29/19  18:20    


 


Ur Phencyclidine Scrn  Presumptive negative   11/29/19  18:20    


 


Ur Amphetamines Screen  Presumptive negative   11/29/19  18:20    


 


U Benzodiazepines Scrn  Presumptive negative   11/29/19  18:20    


 


Urine Cocaine Screen  Presumptive negative   11/29/19  18:20    


 


U Marijuana (THC) Screen  Presumptive negative   11/29/19  18:20    


 


Drugs of Abuse Note  Disclamer   11/29/19  18:20    














Active Medications





- Current Medications


Current Medications: 














Generic Name Dose Route Start Last Admin





  Trade Name Freq  PRN Reason Stop Dose Admin


 


Acetaminophen  650 mg  12/01/19 23:03  12/01/19 23:25





  Tylenol  PO   650 mg





  Q6H PRN   Administration





  Pain, Mild (1-3)  


 


Al Hydrox/Mg Hydrox/Simethicone  15 ml  11/27/19 19:03 





  Alum-Mag Hydrox-Simeth 362-203-77yd/5ml  PO  





  Q4H PRN  





  Indigestion  


 


Cholestyramine Resin  8 gm  12/01/19 10:00  12/02/19 09:23





  Questran  PO   8 gm





  BID SAI   Administration


 


Dextrose  0 ml  11/28/19 13:16  12/02/19 17:10





  D50w (25gm) Syringe  IV   20 ml





  Q30MIN PRN   Administration





  Hypoglycemia  





  Protocol  


 


Famotidine  20 mg  11/30/19 10:00  12/02/19 09:23





  Pepcid  PO   20 mg





  BID SAI   Administration


 


Potassium Chloride 10 meq/  1,005 mls @ 100 mls/hr  12/01/19 04:15  12/02/19 

08:32





  Sodium Chloride  IV   100 mls/hr





  AS DIRECT SAI   Administration


 


Insulin Human Isoph/Insulin Regular  20 unit  12/01/19 08:00  12/02/19 08:37





  Humulin 70/30  SUB-Q   20 unit





  BIDDIAB SAI   Administration


 


Insulin Human Lispro  0 unit  11/29/19 11:30  12/02/19 17:09





  Humalog  SUB-Q   Not Given





  ACHS SAI  





  Protocol  


 


Loperamide HCl  2 mg  12/01/19 09:39  12/02/19 15:01





  Imodium A-D  PO   2 mg





  Q2H PRN   Administration





  Diarrhea  


 


Metoprolol Tartrate  12.5 mg  11/28/19 22:00  12/02/19 09:23





  Metoprolol  PO   12.5 mg





  BID SAI   Administration


 


Ondansetron HCl  4 mg  11/27/19 19:03  11/27/19 21:20





  Zofran  IV   4 mg





  Q4H PRN   Administration





  Nausea And Vomiting  


 


Sodium Chloride  10 ml  11/26/19 22:00  12/02/19 09:24





  Sodium Chloride Flush Syringe 10 Ml  IV   10 ml





  BID SAI   Administration


 


Sodium Chloride  10 ml  11/26/19 20:04 





  Sodium Chloride Flush Syringe 10 Ml  IV  





  PRN PRN  





  LINE FLUSH  


 


Zolpidem Tartrate  5 mg  11/27/19 19:05 





  Ambien  PO  





  QHS PRN  





  Sleep  














Nutrition/Malnutrition Assess





- Dietary Evaluation


Nutrition/Malnutrition Findings: 


                                        





Nutrition Notes                                            Start:  11/28/19 11

:49


Freq:                                                      Status: Active       

 


Protocol:                                                                       

 


 Document     12/02/19 13:27  LM  (Rec: 12/02/19 13:30  LM  [id number])


 Nutrition Notes


     Initial or Follow up                        Brief Note


     Current Diagnosis                           Diabetes


     Other Pertinent Diagnosis                   DKA


     Current Diet                                Consistent CHO/mech soft


     Labs/Tests                                  POC glu 81


     Pertinent Medications                       Humulin


     Height                                      5 ft 4 in


     Weight                                      51.3 kg


     Ideal Body Weight (kg)                      59.09


     BMI                                         19.4


     Weight change and time frame                wt gain noted


     Subjective/Other Information                Pt asleep at time of vist.


 Nutrition Intervention


     Follow-Up By:                               12/03/19


     Additional Comments                         F/U for intakes, ONS needs, wt

## 2019-12-02 NOTE — PROGRESS NOTE
Assessment and Plan





Patient alert, awake. Patient complaining shortness of breath. Patient is on 

high flow O2. Vapotherm ,FIO2 50%. O2 saturation running 98%. Patient has cough 

with productive white sputum. No chest pain. History of depression and 

hypertension. Patient admitted for DKA. Patient has history of smoking  1/2 a 

pack a day for 10 years. Complaining nasal congestion. No sore throat. Patient 

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 Denies 

alcohol or drug abuse. No known drug allergies.





- Patient Problems


(1) Sepsis


Current Visit: Yes   Status: Acute   


Qualifiers: 


   Sepsis acute organ dysfunction status: with acute organ dysfunction 


Plan to address problem: 


Patient treated with ceftrioxone, Zithromax and vancomycin.








(2) DKA (diabetic ketoacidoses)


Current Visit: Yes   Status: Acute   


Plan to address problem: 


Improving . still anion gap 19.


Management as per primary care.








(3) ARIANNA (acute kidney injury)


Current Visit: Yes   Status: Acute   


Plan to address problem: 


Management as per nephrology.








(4) Pulmonary infiltrates on CXR


Current Visit: Yes   Status: Acute   


Plan to address problem: 


Patient treated with ceftrioxone, zithromax and vancomycin.


Patient afebrile, No leukocytosis.








Subjective


Date of service: 12/02/19


Principal diagnosis: Ac Hypoxemic Resp Failure; Jesus. Pulm infiltrates; DKA; 

Sepsis


Interval history: 





Patient alert, awake. Patient complaining shortness of breath. Patient is on 

high flow O2. Vapotherm ,FIO2 50%. O2 saturation running 98%. Patient has cough 

with productive white sputum. No chest pain. History of depression and 

hypertension. Patient admitted for DKA. Patient has history of smoking  1/2 a 

pack a day for 10 years. Complaining nasal congestion. No sore throat. Patient 

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 Denies 

alcohol or drug abuse. No known drug allergies.





Objective


                               Vital Signs - 12hr











  12/02/19 12/02/19 12/02/19





  05:00 06:00 07:00


 


Temperature   


 


Pulse Rate 96 H 93 H 102 H


 


Pulse Rate [   





From Monitor]   


 


Respiratory 24 17 20





Rate   


 


Blood Pressure 84/52 93/59 93/59


 


O2 Sat by Pulse 99 99 100





Oximetry   














  12/02/19 12/02/19 12/02/19





  08:00 08:02 09:00


 


Temperature 98.2 F  


 


Pulse Rate 98 H  110 H


 


Pulse Rate [ 110 H  





From Monitor]   


 


Respiratory 22  22





Rate   


 


Blood Pressure 105/74  121/88


 


O2 Sat by Pulse 98 96 





Oximetry   














  12/02/19 12/02/19 12/02/19





  09:23 10:00 11:00


 


Temperature   


 


Pulse Rate 101 H 102 H 100 H


 


Pulse Rate [   





From Monitor]   


 


Respiratory  19 20





Rate   


 


Blood Pressure 121/88 112/81 99/63


 


O2 Sat by Pulse   





Oximetry   














  12/02/19 12/02/19 12/02/19





  12:00 13:00 13:44


 


Temperature 98.9 F  


 


Pulse Rate 102 H 103 H 


 


Pulse Rate [ 98 H  





From Monitor]   


 


Respiratory 20 23 





Rate   


 


Blood Pressure 94/59 107/73 


 


O2 Sat by Pulse 96  94





Oximetry   














  12/02/19 12/02/19 12/02/19





  14:00 15:00 15:58


 


Temperature   


 


Pulse Rate 98 H 101 H 


 


Pulse Rate [   





From Monitor]   


 


Respiratory 26 H 11 L 





Rate   


 


Blood Pressure 104/73 101/70 


 


O2 Sat by Pulse   95





Oximetry   











Constitutional: no acute distress, alert, other (Young male, normocephalic with 

mildly increased respiratory effort at rest)


Eyes: non-icteric


ENT: oropharynx moist, other (Mallampati 2)


Neck: supple, no JVD


Effort: mildly labored


Ascultation: Bilateral: diminished breath sounds, rales


Percussion: Bilateral: not dull


Cardiovascular: regular rate and rhythm, other (S1,S2, no murmurs)


Gastrointestinal: normoactive bowel sounds, soft, non-tender, non-distended


Integumentary: normal


Extremities: no cyanosis, no edema, pulses normal, no ischemia or petechiae


Neurologic: normal mental status, non-focal exam, pupils equal and round, CN II-

XII normal, motor strength normal and


Psychiatric: mood appropriate, anxious


CBC and BMP: 


                                12/02/19 Unknown





                                 12/02/19 07:49


ABG, PT/INR, D-dimer: 


ABG











POC ABG pH  7.453  (7.35-7.45)  H  11/28/19  21:30    


 


POC ABG pCO2  30.0  (35-45)  L  11/28/19  21:30    


 


POC ABG pO2  < 50  ()  L  11/28/19  21:30    


 


POC ABG HCO3  21.0  (22-26 mml/L)   11/28/19  21:30    


 


POC ABG Total CO2  22  (23-27mmol/L)   11/28/19  21:30    


 


POC ABG O2 Sat  76   11/28/19  21:30    





PT/INR, D-dimer











D-Dimer  5088.17 ng/mlDDU (0-234)  H  11/28/19  11:47    








Abnormal lab findings: 


                                  Abnormal Labs











  11/26/19 11/26/19 11/26/19





  18:43 18:43 18:43


 


WBC   


 


Hgb   


 


Hct   


 


MCHC   


 


Plt Count   


 


Mono % (Auto)   


 


Eos % (Auto)   


 


Eos #   


 


Seg Neutrophils %   


 


Seg Neuts % (Manual)   


 


Lymphocytes % (Manual)   


 


Seg Neutrophils # Man   


 


Lymphocytes # (Manual)   


 


Monocytes # (Manual)   


 


D-Dimer   


 


POC ABG pH   


 


POC ABG pCO2   


 


POC ABG pO2   


 


VBG pH   


 


Sodium   134 L 


 


Potassium   


 


Chloride   92.4 L 


 


Carbon Dioxide   3 L* 


 


BUN   


 


Creatinine   1.6 H 


 


Glucose   581 H* 


 


POC Glucose   


 


Hemoglobin A1c   


 


Lactic Acid   


 


Calcium   


 


Phosphorus  5.50 H  


 


Magnesium   


 


Alkaline Phosphatase    166 H


 


NT-Pro-B Natriuret Pep   


 


Total Protein   


 


Albumin   


 


Lipase   














  11/26/19 11/26/19 11/26/19





  18:43 18:43 19:04


 


WBC   


 


Hgb   


 


Hct   


 


MCHC   


 


Plt Count   


 


Mono % (Auto)   


 


Eos % (Auto)   


 


Eos #   


 


Seg Neutrophils %   


 


Seg Neuts % (Manual)   


 


Lymphocytes % (Manual)   


 


Seg Neutrophils # Man   


 


Lymphocytes # (Manual)   


 


Monocytes # (Manual)   


 


D-Dimer   


 


POC ABG pH   


 


POC ABG pCO2   


 


POC ABG pO2   


 


VBG pH  6.993 L*  


 


Sodium   


 


Potassium   


 


Chloride   


 


Carbon Dioxide   


 


BUN   


 


Creatinine   


 


Glucose   


 


POC Glucose    437 H


 


Hemoglobin A1c   


 


Lactic Acid   


 


Calcium   


 


Phosphorus   


 


Magnesium   


 


Alkaline Phosphatase   


 


NT-Pro-B Natriuret Pep   


 


Total Protein   


 


Albumin   


 


Lipase   64 H 














  11/26/19 11/26/19 11/26/19





  19:40 20:14 20:45


 


WBC   24.8 H 


 


Hgb   


 


Hct   


 


MCHC   31 L 


 


Plt Count   


 


Mono % (Auto)   


 


Eos % (Auto)   


 


Eos #   


 


Seg Neutrophils %   


 


Seg Neuts % (Manual)   89.0 H 


 


Lymphocytes % (Manual)   7.0 L 


 


Seg Neutrophils # Man   22.1 H 


 


Lymphocytes # (Manual)   


 


Monocytes # (Manual)   1.0 H 


 


D-Dimer   


 


POC ABG pH   


 


POC ABG pCO2   


 


POC ABG pO2   


 


VBG pH   


 


Sodium  135 L  


 


Potassium   


 


Chloride  94.4 L  


 


Carbon Dioxide  2 L*  


 


BUN   


 


Creatinine  1.6 H  


 


Glucose  536 H*  


 


POC Glucose    411 H


 


Hemoglobin A1c   


 


Lactic Acid   


 


Calcium   


 


Phosphorus  5.10 H  


 


Magnesium   


 


Alkaline Phosphatase   


 


NT-Pro-B Natriuret Pep   


 


Total Protein   


 


Albumin   


 


Lipase   














  11/26/19 11/26/19 11/26/19





  21:25 21:25 21:59


 


WBC   


 


Hgb   


 


Hct   


 


MCHC   


 


Plt Count   


 


Mono % (Auto)   


 


Eos % (Auto)   


 


Eos #   


 


Seg Neutrophils %   


 


Seg Neuts % (Manual)   


 


Lymphocytes % (Manual)   


 


Seg Neutrophils # Man   


 


Lymphocytes # (Manual)   


 


Monocytes # (Manual)   


 


D-Dimer   


 


POC ABG pH   


 


POC ABG pCO2   


 


POC ABG pO2   


 


VBG pH   


 


Sodium   


 


Potassium  3.5 L  


 


Chloride   


 


Carbon Dioxide  5 L*  


 


BUN   


 


Creatinine   


 


Glucose  370 H  


 


POC Glucose    289 H


 


Hemoglobin A1c   


 


Lactic Acid   2.80 H* 


 


Calcium  8.2 L  


 


Phosphorus   


 


Magnesium   


 


Alkaline Phosphatase   


 


NT-Pro-B Natriuret Pep   


 


Total Protein   


 


Albumin   


 


Lipase   














  11/26/19 11/27/19 11/27/19





  23:16 00:32 00:37


 


WBC   


 


Hgb   


 


Hct   


 


MCHC   


 


Plt Count   


 


Mono % (Auto)   


 


Eos % (Auto)   


 


Eos #   


 


Seg Neutrophils %   


 


Seg Neuts % (Manual)   


 


Lymphocytes % (Manual)   


 


Seg Neutrophils # Man   


 


Lymphocytes # (Manual)   


 


Monocytes # (Manual)   


 


D-Dimer   


 


POC ABG pH   


 


POC ABG pCO2   


 


POC ABG pO2   


 


VBG pH   


 


Sodium    148 H


 


Potassium    3.0 L


 


Chloride    116.1 H


 


Carbon Dioxide    8 L*


 


BUN   


 


Creatinine   


 


Glucose    135 H


 


POC Glucose  192 H  127 H 


 


Hemoglobin A1c   


 


Lactic Acid   


 


Calcium    7.1 L


 


Phosphorus   


 


Magnesium   


 


Alkaline Phosphatase   


 


NT-Pro-B Natriuret Pep   


 


Total Protein   


 


Albumin   


 


Lipase   














  11/27/19 11/27/19 11/27/19





  01:17 02:17 03:27


 


WBC   


 


Hgb   


 


Hct   


 


MCHC   


 


Plt Count   


 


Mono % (Auto)   


 


Eos % (Auto)   


 


Eos #   


 


Seg Neutrophils %   


 


Seg Neuts % (Manual)   


 


Lymphocytes % (Manual)   


 


Seg Neutrophils # Man   


 


Lymphocytes # (Manual)   


 


Monocytes # (Manual)   


 


D-Dimer   


 


POC ABG pH   


 


POC ABG pCO2   


 


POC ABG pO2   


 


VBG pH   


 


Sodium   


 


Potassium   


 


Chloride   


 


Carbon Dioxide   


 


BUN   


 


Creatinine   


 


Glucose   


 


POC Glucose  135 H  166 H  254 H


 


Hemoglobin A1c   


 


Lactic Acid   


 


Calcium   


 


Phosphorus   


 


Magnesium   


 


Alkaline Phosphatase   


 


NT-Pro-B Natriuret Pep   


 


Total Protein   


 


Albumin   


 


Lipase   














  11/27/19 11/27/19 11/27/19





  04:13 04:34 05:41


 


WBC   


 


Hgb   


 


Hct   


 


MCHC   


 


Plt Count   


 


Mono % (Auto)   


 


Eos % (Auto)   


 


Eos #   


 


Seg Neutrophils %   


 


Seg Neuts % (Manual)   


 


Lymphocytes % (Manual)   


 


Seg Neutrophils # Man   


 


Lymphocytes # (Manual)   


 


Monocytes # (Manual)   


 


D-Dimer   


 


POC ABG pH   


 


POC ABG pCO2   


 


POC ABG pO2   


 


VBG pH   


 


Sodium  147 H  


 


Potassium  3.3 L  


 


Chloride  113.4 H  


 


Carbon Dioxide  7 L*  


 


BUN   


 


Creatinine   


 


Glucose  320 H  


 


POC Glucose   320 H  333 H


 


Hemoglobin A1c   


 


Lactic Acid   


 


Calcium  7.5 L  


 


Phosphorus   


 


Magnesium   


 


Alkaline Phosphatase   


 


NT-Pro-B Natriuret Pep   


 


Total Protein   


 


Albumin   


 


Lipase   














  11/27/19 11/27/19 11/27/19





  06:20 07:27 07:53


 


WBC   


 


Hgb   


 


Hct   


 


MCHC   


 


Plt Count   


 


Mono % (Auto)   


 


Eos % (Auto)   


 


Eos #   


 


Seg Neutrophils %   


 


Seg Neuts % (Manual)   


 


Lymphocytes % (Manual)   


 


Seg Neutrophils # Man   


 


Lymphocytes # (Manual)   


 


Monocytes # (Manual)   


 


D-Dimer   


 


POC ABG pH   


 


POC ABG pCO2   


 


POC ABG pO2   


 


VBG pH   


 


Sodium   


 


Potassium   


 


Chloride   


 


Carbon Dioxide   


 


BUN   


 


Creatinine   


 


Glucose   


 


POC Glucose  363 H  343 H  319 H


 


Hemoglobin A1c   


 


Lactic Acid   


 


Calcium   


 


Phosphorus   


 


Magnesium   


 


Alkaline Phosphatase   


 


NT-Pro-B Natriuret Pep   


 


Total Protein   


 


Albumin   


 


Lipase   














  11/27/19 11/27/19 11/27/19





  08:46 09:45 10:03


 


WBC   


 


Hgb   


 


Hct   


 


MCHC   


 


Plt Count   


 


Mono % (Auto)   


 


Eos % (Auto)   


 


Eos #   


 


Seg Neutrophils %   


 


Seg Neuts % (Manual)   


 


Lymphocytes % (Manual)   


 


Seg Neutrophils # Man   


 


Lymphocytes # (Manual)   


 


Monocytes # (Manual)   


 


D-Dimer   


 


POC ABG pH   


 


POC ABG pCO2   


 


POC ABG pO2   


 


VBG pH   


 


Sodium    148 H


 


Potassium    3.1 L


 


Chloride    114.1 H


 


Carbon Dioxide    4 L*


 


BUN   


 


Creatinine   


 


Glucose    364 H


 


POC Glucose  402 H  340 H 


 


Hemoglobin A1c   


 


Lactic Acid   


 


Calcium    8.3 L


 


Phosphorus   


 


Magnesium   


 


Alkaline Phosphatase   


 


NT-Pro-B Natriuret Pep   


 


Total Protein   


 


Albumin   


 


Lipase   














  11/27/19 11/27/19 11/27/19





  10:42 11:26 12:53


 


WBC   


 


Hgb   


 


Hct   


 


MCHC   


 


Plt Count   


 


Mono % (Auto)   


 


Eos % (Auto)   


 


Eos #   


 


Seg Neutrophils %   


 


Seg Neuts % (Manual)   


 


Lymphocytes % (Manual)   


 


Seg Neutrophils # Man   


 


Lymphocytes # (Manual)   


 


Monocytes # (Manual)   


 


D-Dimer   


 


POC ABG pH   


 


POC ABG pCO2   


 


POC ABG pO2   


 


VBG pH   


 


Sodium   


 


Potassium   


 


Chloride   


 


Carbon Dioxide   


 


BUN   


 


Creatinine   


 


Glucose   


 


POC Glucose  321 H  241 H  175 H


 


Hemoglobin A1c   


 


Lactic Acid   


 


Calcium   


 


Phosphorus   


 


Magnesium   


 


Alkaline Phosphatase   


 


NT-Pro-B Natriuret Pep   


 


Total Protein   


 


Albumin   


 


Lipase   














  11/27/19 11/27/19 11/27/19





  13:36 13:47 17:36


 


WBC   


 


Hgb   


 


Hct   


 


MCHC   


 


Plt Count   


 


Mono % (Auto)   


 


Eos % (Auto)   


 


Eos #   


 


Seg Neutrophils %   


 


Seg Neuts % (Manual)   


 


Lymphocytes % (Manual)   


 


Seg Neutrophils # Man   


 


Lymphocytes # (Manual)   


 


Monocytes # (Manual)   


 


D-Dimer   


 


POC ABG pH   


 


POC ABG pCO2   


 


POC ABG pO2   


 


VBG pH   


 


Sodium  151 H  


 


Potassium  3.1 L  


 


Chloride  126.5 H  


 


Carbon Dioxide  12 L D  


 


BUN   


 


Creatinine   


 


Glucose  131 H  


 


POC Glucose   120 H  107 H


 


Hemoglobin A1c   


 


Lactic Acid   


 


Calcium  8.2 L  


 


Phosphorus   


 


Magnesium   


 


Alkaline Phosphatase   


 


NT-Pro-B Natriuret Pep   


 


Total Protein   


 


Albumin   


 


Lipase   














  11/27/19 11/27/19 11/27/19





  18:59 20:32 22:05


 


WBC   


 


Hgb   


 


Hct   


 


MCHC   


 


Plt Count   


 


Mono % (Auto)   


 


Eos % (Auto)   


 


Eos #   


 


Seg Neutrophils %   


 


Seg Neuts % (Manual)   


 


Lymphocytes % (Manual)   


 


Seg Neutrophils # Man   


 


Lymphocytes # (Manual)   


 


Monocytes # (Manual)   


 


D-Dimer   


 


POC ABG pH   


 


POC ABG pCO2   


 


POC ABG pO2   


 


VBG pH   


 


Sodium   146 H 


 


Potassium   


 


Chloride   116.4 H 


 


Carbon Dioxide   10 L 


 


BUN   


 


Creatinine   


 


Glucose   267 H 


 


POC Glucose  153 H   336 H


 


Hemoglobin A1c   


 


Lactic Acid   


 


Calcium   


 


Phosphorus   


 


Magnesium   


 


Alkaline Phosphatase   


 


NT-Pro-B Natriuret Pep   


 


Total Protein   


 


Albumin   


 


Lipase   














  11/28/19 11/28/19 11/28/19





  05:00 07:47 11:21


 


WBC   


 


Hgb   


 


Hct   


 


MCHC   


 


Plt Count   


 


Mono % (Auto)   


 


Eos % (Auto)   


 


Eos #   


 


Seg Neutrophils %   


 


Seg Neuts % (Manual)   


 


Lymphocytes % (Manual)   


 


Seg Neutrophils # Man   


 


Lymphocytes # (Manual)   


 


Monocytes # (Manual)   


 


D-Dimer   


 


POC ABG pH   


 


POC ABG pCO2   


 


POC ABG pO2   


 


VBG pH   


 


Sodium   


 


Potassium  3.3 L  


 


Chloride  111.1 H  


 


Carbon Dioxide  7 L*  


 


BUN  7 L  


 


Creatinine   


 


Glucose  290 H  


 


POC Glucose   328 H  390 H


 


Hemoglobin A1c   


 


Lactic Acid   


 


Calcium   


 


Phosphorus  1.30 L D  


 


Magnesium   


 


Alkaline Phosphatase   


 


NT-Pro-B Natriuret Pep   


 


Total Protein   


 


Albumin   


 


Lipase   














  11/28/19 11/28/19 11/28/19





  11:47 11:47 11:54


 


WBC   


 


Hgb   


 


Hct   


 


MCHC   


 


Plt Count   


 


Mono % (Auto)   


 


Eos % (Auto)   


 


Eos #   


 


Seg Neutrophils %   


 


Seg Neuts % (Manual)   


 


Lymphocytes % (Manual)   


 


Seg Neutrophils # Man   


 


Lymphocytes # (Manual)   


 


Monocytes # (Manual)   


 


D-Dimer   5088.17 H 


 


POC ABG pH   


 


POC ABG pCO2   


 


POC ABG pO2   


 


VBG pH   


 


Sodium   


 


Potassium   


 


Chloride   


 


Carbon Dioxide  5 L*  


 


BUN  7 L  


 


Creatinine   


 


Glucose   


 


POC Glucose   


 


Hemoglobin A1c    15.1 H


 


Lactic Acid   


 


Calcium   


 


Phosphorus   


 


Magnesium   


 


Alkaline Phosphatase  150 H  


 


NT-Pro-B Natriuret Pep   


 


Total Protein  5.6 L D  


 


Albumin  3.4 L  


 


Lipase   














  11/28/19 11/28/19 11/28/19





  11:54 13:04 14:00


 


WBC  17.7 H  


 


Hgb   


 


Hct   


 


MCHC   


 


Plt Count   


 


Mono % (Auto)   


 


Eos % (Auto)   


 


Eos #   


 


Seg Neutrophils %   


 


Seg Neuts % (Manual)   


 


Lymphocytes % (Manual)  4.0 L  


 


Seg Neutrophils # Man  12.2 H  


 


Lymphocytes # (Manual)  0.7 L  


 


Monocytes # (Manual)   


 


D-Dimer   


 


POC ABG pH   7.076 L 


 


POC ABG pCO2   27.9 L 


 


POC ABG pO2   71 L 


 


VBG pH   


 


Sodium   


 


Potassium   


 


Chloride   


 


Carbon Dioxide   


 


BUN   


 


Creatinine   


 


Glucose   


 


POC Glucose    371 H


 


Hemoglobin A1c   


 


Lactic Acid   


 


Calcium   


 


Phosphorus   


 


Magnesium   


 


Alkaline Phosphatase   


 


NT-Pro-B Natriuret Pep   


 


Total Protein   


 


Albumin   


 


Lipase   














  11/28/19 11/28/19 11/28/19





  15:13 16:09 17:13


 


WBC   


 


Hgb   


 


Hct   


 


MCHC   


 


Plt Count   


 


Mono % (Auto)   


 


Eos % (Auto)   


 


Eos #   


 


Seg Neutrophils %   


 


Seg Neuts % (Manual)   


 


Lymphocytes % (Manual)   


 


Seg Neutrophils # Man   


 


Lymphocytes # (Manual)   


 


Monocytes # (Manual)   


 


D-Dimer   


 


POC ABG pH   


 


POC ABG pCO2   


 


POC ABG pO2   


 


VBG pH   


 


Sodium   


 


Potassium   


 


Chloride   


 


Carbon Dioxide   


 


BUN   


 


Creatinine   


 


Glucose   


 


POC Glucose  259 H  186 H  138 H


 


Hemoglobin A1c   


 


Lactic Acid   


 


Calcium   


 


Phosphorus   


 


Magnesium   


 


Alkaline Phosphatase   


 


NT-Pro-B Natriuret Pep   


 


Total Protein   


 


Albumin   


 


Lipase   














  11/28/19 11/28/19 11/28/19





  18:12 18:43 18:43


 


WBC   


 


Hgb   


 


Hct   


 


MCHC   


 


Plt Count   


 


Mono % (Auto)   


 


Eos % (Auto)   


 


Eos #   


 


Seg Neutrophils %   


 


Seg Neuts % (Manual)   


 


Lymphocytes % (Manual)   


 


Seg Neutrophils # Man   


 


Lymphocytes # (Manual)   


 


Monocytes # (Manual)   


 


D-Dimer   


 


POC ABG pH   


 


POC ABG pCO2   


 


POC ABG pO2   


 


VBG pH   


 


Sodium   


 


Potassium    3.0 L


 


Chloride   


 


Carbon Dioxide    15 L D


 


BUN    7 L


 


Creatinine   


 


Glucose    132 H


 


POC Glucose  121 H  


 


Hemoglobin A1c   


 


Lactic Acid   


 


Calcium    8.3 L


 


Phosphorus   1.70 L D 


 


Magnesium   1.50 L 


 


Alkaline Phosphatase   


 


NT-Pro-B Natriuret Pep   


 


Total Protein   


 


Albumin   


 


Lipase   














  11/28/19 11/28/19 11/28/19





  18:43 18:43 19:20


 


WBC   


 


Hgb   


 


Hct   


 


MCHC   


 


Plt Count   


 


Mono % (Auto)   


 


Eos % (Auto)   


 


Eos #   


 


Seg Neutrophils %   


 


Seg Neuts % (Manual)   


 


Lymphocytes % (Manual)   


 


Seg Neutrophils # Man   


 


Lymphocytes # (Manual)   


 


Monocytes # (Manual)   


 


D-Dimer   


 


POC ABG pH   


 


POC ABG pCO2   


 


POC ABG pO2   


 


VBG pH   


 


Sodium   


 


Potassium   


 


Chloride   


 


Carbon Dioxide   


 


BUN   


 


Creatinine   


 


Glucose   


 


POC Glucose    120 H


 


Hemoglobin A1c   


 


Lactic Acid  4.00 H*  


 


Calcium   


 


Phosphorus   


 


Magnesium   


 


Alkaline Phosphatase   


 


NT-Pro-B Natriuret Pep   1292 H 


 


Total Protein   


 


Albumin   


 


Lipase   














  11/28/19 11/28/19 11/28/19





  20:55 21:30 21:33


 


WBC   


 


Hgb   


 


Hct   


 


MCHC   


 


Plt Count   


 


Mono % (Auto)   


 


Eos % (Auto)   


 


Eos #   


 


Seg Neutrophils %   


 


Seg Neuts % (Manual)   


 


Lymphocytes % (Manual)   


 


Seg Neutrophils # Man   


 


Lymphocytes # (Manual)   


 


Monocytes # (Manual)   


 


D-Dimer   


 


POC ABG pH   7.453 H 


 


POC ABG pCO2  30.5 L  30.0 L 


 


POC ABG pO2   < 50 L 


 


VBG pH   


 


Sodium   


 


Potassium   


 


Chloride   


 


Carbon Dioxide   


 


BUN   


 


Creatinine   


 


Glucose   


 


POC Glucose    121 H


 


Hemoglobin A1c   


 


Lactic Acid   


 


Calcium   


 


Phosphorus   


 


Magnesium   


 


Alkaline Phosphatase   


 


NT-Pro-B Natriuret Pep   


 


Total Protein   


 


Albumin   


 


Lipase   














  11/28/19 11/28/19 11/28/19





  21:59 22:40 22:53


 


WBC   


 


Hgb   


 


Hct   


 


MCHC   


 


Plt Count   


 


Mono % (Auto)   


 


Eos % (Auto)   


 


Eos #   


 


Seg Neutrophils %   


 


Seg Neuts % (Manual)   


 


Lymphocytes % (Manual)   


 


Seg Neutrophils # Man   


 


Lymphocytes # (Manual)   


 


Monocytes # (Manual)   


 


D-Dimer   


 


POC ABG pH   


 


POC ABG pCO2   


 


POC ABG pO2   


 


VBG pH   


 


Sodium   


 


Potassium  3.0 L   2.6 L*


 


Chloride  107.9 H  


 


Carbon Dioxide  19 L   21 L


 


BUN  7 L   7 L


 


Creatinine   


 


Glucose  138 H   148 H


 


POC Glucose   137 H 


 


Hemoglobin A1c   


 


Lactic Acid   


 


Calcium  7.9 L   7.9 L


 


Phosphorus   


 


Magnesium   


 


Alkaline Phosphatase   


 


NT-Pro-B Natriuret Pep   


 


Total Protein   


 


Albumin   


 


Lipase   














  11/28/19 11/29/19 11/29/19





  23:41 02:54 03:51


 


WBC   


 


Hgb   


 


Hct   


 


MCHC   


 


Plt Count   


 


Mono % (Auto)   


 


Eos % (Auto)   


 


Eos #   


 


Seg Neutrophils %   


 


Seg Neuts % (Manual)   


 


Lymphocytes % (Manual)   


 


Seg Neutrophils # Man   


 


Lymphocytes # (Manual)   


 


Monocytes # (Manual)   


 


D-Dimer   


 


POC ABG pH   


 


POC ABG pCO2   


 


POC ABG pO2   


 


VBG pH   


 


Sodium   


 


Potassium   


 


Chloride   


 


Carbon Dioxide   


 


BUN   


 


Creatinine   


 


Glucose   


 


POC Glucose  115 H  143 H  126 H


 


Hemoglobin A1c   


 


Lactic Acid   


 


Calcium   


 


Phosphorus   


 


Magnesium   


 


Alkaline Phosphatase   


 


NT-Pro-B Natriuret Pep   


 


Total Protein   


 


Albumin   


 


Lipase   














  11/29/19 11/29/19 11/29/19





  04:42 05:06 05:44


 


WBC   


 


Hgb   


 


Hct   


 


MCHC   


 


Plt Count   


 


Mono % (Auto)   


 


Eos % (Auto)   


 


Eos #   


 


Seg Neutrophils %   


 


Seg Neuts % (Manual)   


 


Lymphocytes % (Manual)   


 


Seg Neutrophils # Man   


 


Lymphocytes # (Manual)   


 


Monocytes # (Manual)   


 


D-Dimer   


 


POC ABG pH   


 


POC ABG pCO2   


 


POC ABG pO2   


 


VBG pH   


 


Sodium   


 


Potassium   3.0 L 


 


Chloride   108.2 H 


 


Carbon Dioxide   19 L 


 


BUN   7 L 


 


Creatinine   


 


Glucose   173 H 


 


POC Glucose  127 H   163 H


 


Hemoglobin A1c   


 


Lactic Acid   


 


Calcium   8.1 L 


 


Phosphorus   1.40 L 


 


Magnesium   1.50 L 


 


Alkaline Phosphatase   


 


NT-Pro-B Natriuret Pep   


 


Total Protein   


 


Albumin   


 


Lipase   














  11/29/19 11/29/19 11/29/19





  06:41 08:36 09:31


 


WBC   


 


Hgb   


 


Hct   


 


MCHC   


 


Plt Count   


 


Mono % (Auto)   


 


Eos % (Auto)   


 


Eos #   


 


Seg Neutrophils %   


 


Seg Neuts % (Manual)   


 


Lymphocytes % (Manual)   


 


Seg Neutrophils # Man   


 


Lymphocytes # (Manual)   


 


Monocytes # (Manual)   


 


D-Dimer   


 


POC ABG pH   


 


POC ABG pCO2   


 


POC ABG pO2   


 


VBG pH   


 


Sodium   


 


Potassium   


 


Chloride   


 


Carbon Dioxide   


 


BUN   


 


Creatinine   


 


Glucose   


 


POC Glucose  159 H  132 H  148 H


 


Hemoglobin A1c   


 


Lactic Acid   


 


Calcium   


 


Phosphorus   


 


Magnesium   


 


Alkaline Phosphatase   


 


NT-Pro-B Natriuret Pep   


 


Total Protein   


 


Albumin   


 


Lipase   














  11/29/19 11/29/19 11/29/19





  10:42 11:53 13:28


 


WBC   


 


Hgb   


 


Hct   


 


MCHC   


 


Plt Count   


 


Mono % (Auto)   


 


Eos % (Auto)   


 


Eos #   


 


Seg Neutrophils %   


 


Seg Neuts % (Manual)   


 


Lymphocytes % (Manual)   


 


Seg Neutrophils # Man   


 


Lymphocytes # (Manual)   


 


Monocytes # (Manual)   


 


D-Dimer   


 


POC ABG pH   


 


POC ABG pCO2   


 


POC ABG pO2   


 


VBG pH   


 


Sodium   


 


Potassium    3.2 L


 


Chloride   


 


Carbon Dioxide    14 L


 


BUN    7 L


 


Creatinine   


 


Glucose    256 H


 


POC Glucose  191 H  181 H 


 


Hemoglobin A1c   


 


Lactic Acid   


 


Calcium    8.0 L


 


Phosphorus   


 


Magnesium   


 


Alkaline Phosphatase   


 


NT-Pro-B Natriuret Pep   


 


Total Protein   


 


Albumin   


 


Lipase   














  11/29/19 11/29/19 11/30/19





  16:14 22:24 06:25


 


WBC   


 


Hgb   


 


Hct   


 


MCHC   


 


Plt Count   


 


Mono % (Auto)   


 


Eos % (Auto)   


 


Eos #   


 


Seg Neutrophils %   


 


Seg Neuts % (Manual)   


 


Lymphocytes % (Manual)   


 


Seg Neutrophils # Man   


 


Lymphocytes # (Manual)   


 


Monocytes # (Manual)   


 


D-Dimer   


 


POC ABG pH   


 


POC ABG pCO2   


 


POC ABG pO2   


 


VBG pH   


 


Sodium    148 H


 


Potassium    3.2 L


 


Chloride    116.3 H


 


Carbon Dioxide    17 L


 


BUN    7 L


 


Creatinine   


 


Glucose    192 H


 


POC Glucose  334 H  106 H 


 


Hemoglobin A1c   


 


Lactic Acid   


 


Calcium    8.3 L


 


Phosphorus   


 


Magnesium   


 


Alkaline Phosphatase   


 


NT-Pro-B Natriuret Pep   


 


Total Protein   


 


Albumin   


 


Lipase   














  11/30/19 11/30/19 11/30/19





  07:38 12:48 16:24


 


WBC   


 


Hgb   


 


Hct   


 


MCHC   


 


Plt Count   


 


Mono % (Auto)   


 


Eos % (Auto)   


 


Eos #   


 


Seg Neutrophils %   


 


Seg Neuts % (Manual)   


 


Lymphocytes % (Manual)   


 


Seg Neutrophils # Man   


 


Lymphocytes # (Manual)   


 


Monocytes # (Manual)   


 


D-Dimer   


 


POC ABG pH   


 


POC ABG pCO2   


 


POC ABG pO2   


 


VBG pH   


 


Sodium   


 


Potassium   


 


Chloride   


 


Carbon Dioxide   


 


BUN   


 


Creatinine   


 


Glucose   


 


POC Glucose  200 H  156 H  223 H


 


Hemoglobin A1c   


 


Lactic Acid   


 


Calcium   


 


Phosphorus   


 


Magnesium   


 


Alkaline Phosphatase   


 


NT-Pro-B Natriuret Pep   


 


Total Protein   


 


Albumin   


 


Lipase   














  11/30/19 12/01/19 12/01/19





  21:27 06:00 09:30


 


WBC   


 


Hgb    11.2 L


 


Hct    33.7 L D


 


MCHC   


 


Plt Count    58 L


 


Todd % (Auto)   


 


Eos % (Auto)    4.7 H


 


Eos #   


 


Seg Neutrophils %    75.7 H


 


Seg Neuts % (Manual)   


 


Lymphocytes % (Manual)   


 


Seg Neutrophils # Man   


 


Lymphocytes # (Manual)   


 


Monocytes # (Manual)   


 


D-Dimer   


 


POC ABG pH   


 


POC ABG pCO2   


 


POC ABG pO2   


 


VBG pH   


 


Sodium   


 


Potassium   2.5 L* D 


 


Chloride   


 


Carbon Dioxide   19 L 


 


BUN   5 L 


 


Creatinine   


 


Glucose   42 L 


 


POC Glucose  219 H  


 


Hemoglobin A1c   


 


Lactic Acid   


 


Calcium   8.1 L 


 


Phosphorus   


 


Magnesium   


 


Alkaline Phosphatase   


 


NT-Pro-B Natriuret Pep   


 


Total Protein   


 


Albumin   


 


Lipase   














  12/01/19 12/01/19 12/02/19





  11:57 22:08 01:15


 


WBC   


 


Hgb   


 


Hct   


 


MCHC   


 


Plt Count   


 


Mono % (Auto)   


 


Eos % (Auto)   


 


Eos #   


 


Seg Neutrophils %   


 


Seg Neuts % (Manual)   


 


Lymphocytes % (Manual)   


 


Seg Neutrophils # Man   


 


Lymphocytes # (Manual)   


 


Monocytes # (Manual)   


 


D-Dimer   


 


POC ABG pH   


 


POC ABG pCO2   


 


POC ABG pO2   


 


VBG pH   


 


Sodium   


 


Potassium   


 


Chloride   


 


Carbon Dioxide   


 


BUN   


 


Creatinine   


 


Glucose   


 


POC Glucose  237 H  55 L  < 40 L


 


Hemoglobin A1c   


 


Lactic Acid   


 


Calcium   


 


Phosphorus   


 


Magnesium   


 


Alkaline Phosphatase   


 


NT-Pro-B Natriuret Pep   


 


Total Protein   


 


Albumin   


 


Lipase   














  12/02/19 12/02/19 12/02/19





  02:05 07:49 11:46


 


WBC   


 


Hgb   


 


Hct   


 


MCHC   


 


Plt Count   


 


Mono % (Auto)   


 


Eos % (Auto)   


 


Eos #   


 


Seg Neutrophils %   


 


Seg Neuts % (Manual)   


 


Lymphocytes % (Manual)   


 


Seg Neutrophils # Man   


 


Lymphocytes # (Manual)   


 


Monocytes # (Manual)   


 


D-Dimer   


 


POC ABG pH   


 


POC ABG pCO2   


 


POC ABG pO2   


 


VBG pH   


 


Sodium   


 


Potassium   2.9 L* 


 


Chloride   


 


Carbon Dioxide   20 L 


 


BUN   3 L 


 


Creatinine   


 


Glucose   188 H 


 


POC Glucose  159 H   182 H


 


Hemoglobin A1c   


 


Lactic Acid   


 


Calcium   8.1 L 


 


Phosphorus   


 


Magnesium   


 


Alkaline Phosphatase   


 


NT-Pro-B Natriuret Pep   


 


Total Protein   


 


Albumin   


 


Lipase   














  12/02/19





  Unknown


 


WBC 


 


Hgb  11.1 L


 


Hct  32.7 L


 


MCHC 


 


Plt Count  38 L


 


Mono % (Auto)  7.4 H


 


Eos % (Auto)  9.0 H


 


Eos #  0.6 H


 


Seg Neutrophils % 


 


Seg Neuts % (Manual) 


 


Lymphocytes % (Manual) 


 


Seg Neutrophils # Man 


 


Lymphocytes # (Manual) 


 


Monocytes # (Manual) 


 


D-Dimer 


 


POC ABG pH 


 


POC ABG pCO2 


 


POC ABG pO2 


 


VBG pH 


 


Sodium 


 


Potassium 


 


Chloride 


 


Carbon Dioxide 


 


BUN 


 


Creatinine 


 


Glucose 


 


POC Glucose 


 


Hemoglobin A1c 


 


Lactic Acid 


 


Calcium 


 


Phosphorus 


 


Magnesium 


 


Alkaline Phosphatase 


 


NT-Pro-B Natriuret Pep 


 


Total Protein 


 


Albumin 


 


Lipase 











Chest x-ray: report reviewed (Bilateral pulmonary opacities slightly better than

before.), image reviewed


CT scan - chest: report reviewed, image reviewed


Additional Studies: 








Venous duplex studies of lower extremities. 11/29/19 reported no evidence of 

DVT.








Angio CT of chest reported 11/28/19


IMPRESSION: 


 1. Negative for PTE. 


 2. Severe interstitial and alveolar edema with small bilateral effusions. 


Allied health notes reviewed: nursing

## 2019-12-03 LAB
BUN SERPL-MCNC: 4 MG/DL (ref 9–20)
BUN/CREAT SERPL: 4 %
CALCIUM SERPL-MCNC: 7.4 MG/DL (ref 8.4–10.2)
HCT VFR BLD CALC: 27.6 % (ref 35.5–45.6)
HEMOLYSIS INDEX: 3
HGB BLD-MCNC: 9.3 GM/DL (ref 11.8–15.2)
MCHC RBC AUTO-ENTMCNC: 34 % (ref 32–34)
MCV RBC AUTO: 85 FL (ref 84–94)
PLATELET # BLD: 30 K/MM3 (ref 140–440)
RBC # BLD AUTO: 3.26 M/MM3 (ref 3.65–5.03)

## 2019-12-03 RX ADMIN — POTASSIUM CHLORIDE SCH MEQ: 1500 TABLET, EXTENDED RELEASE ORAL at 21:50

## 2019-12-03 RX ADMIN — Medication SCH ML: at 10:57

## 2019-12-03 RX ADMIN — INSULIN LISPRO SCH: 100 INJECTION, SOLUTION INTRAVENOUS; SUBCUTANEOUS at 18:19

## 2019-12-03 RX ADMIN — POTASSIUM CHLORIDE SCH MLS/HR: 2 INJECTION, SOLUTION, CONCENTRATE INTRAVENOUS at 15:22

## 2019-12-03 RX ADMIN — LOPERAMIDE HYDROCHLORIDE PRN MG: 1 SOLUTION ORAL at 23:06

## 2019-12-03 RX ADMIN — LOPERAMIDE HYDROCHLORIDE PRN MG: 1 SOLUTION ORAL at 18:18

## 2019-12-03 RX ADMIN — METOPROLOL TARTRATE SCH MG: 25 TABLET, FILM COATED ORAL at 10:55

## 2019-12-03 RX ADMIN — INSULIN HUMAN SCH UNIT: 100 INJECTION, SUSPENSION SUBCUTANEOUS at 08:45

## 2019-12-03 RX ADMIN — CHOLESTYRAMINE SCH GM: 4 POWDER, FOR SUSPENSION ORAL at 21:55

## 2019-12-03 RX ADMIN — CHOLESTYRAMINE SCH GM: 4 POWDER, FOR SUSPENSION ORAL at 10:56

## 2019-12-03 RX ADMIN — FAMOTIDINE SCH MG: 20 TABLET ORAL at 10:56

## 2019-12-03 RX ADMIN — FAMOTIDINE SCH MG: 20 TABLET ORAL at 21:54

## 2019-12-03 RX ADMIN — METOPROLOL TARTRATE SCH MG: 25 TABLET, FILM COATED ORAL at 21:53

## 2019-12-03 RX ADMIN — INSULIN LISPRO SCH UNIT: 100 INJECTION, SOLUTION INTRAVENOUS; SUBCUTANEOUS at 08:45

## 2019-12-03 RX ADMIN — INSULIN LISPRO SCH UNIT: 100 INJECTION, SOLUTION INTRAVENOUS; SUBCUTANEOUS at 13:32

## 2019-12-03 RX ADMIN — Medication SCH ML: at 21:55

## 2019-12-03 RX ADMIN — LOPERAMIDE HYDROCHLORIDE PRN MG: 1 SOLUTION ORAL at 10:54

## 2019-12-03 RX ADMIN — POTASSIUM CHLORIDE SCH MEQ: 1500 TABLET, EXTENDED RELEASE ORAL at 15:28

## 2019-12-03 RX ADMIN — INSULIN LISPRO SCH: 100 INJECTION, SOLUTION INTRAVENOUS; SUBCUTANEOUS at 16:30

## 2019-12-03 RX ADMIN — INSULIN LISPRO SCH UNIT: 100 INJECTION, SOLUTION INTRAVENOUS; SUBCUTANEOUS at 21:51

## 2019-12-03 RX ADMIN — ENOXAPARIN SODIUM SCH MG: 100 INJECTION SUBCUTANEOUS at 15:28

## 2019-12-03 RX ADMIN — ZOLPIDEM TARTRATE PRN MG: 5 TABLET ORAL at 21:56

## 2019-12-03 NOTE — PROGRESS NOTE
Assessment and Plan





Patient alert, awake. Patient still complaining shortness of breath on exertion.

Patient is on high flow O2. Vapotherm ,FIO2 40%. O2 saturation running 98%. 

Patient has cough with productive white sputum. No chest pain. History of 

depression and hypertension. Patient admitted for DKA. Patient has history of 

smoking  1/2 a pack a day for 10 years. Complaining nasal congestion. No sore 

throat. 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 

children. Denies alcohol or drug abuse. No known drug allergies.





- Patient Problems


(1) Sepsis


Current Visit: Yes   Status: Acute   


Qualifiers: 


   Sepsis acute organ dysfunction status: with acute organ dysfunction 


Plan to address problem: 


Patient treated with ceftrioxone, Zithromax and vancomycin.








(2) DKA (diabetic ketoacidoses)


Current Visit: Yes   Status: Acute   


Plan to address problem: 


Improved.  Anion gap 11.


Management as per primary care.








(3) ARIANNA (acute kidney injury)


Current Visit: Yes   Status: Acute   


Plan to address problem: 


Management as per nephrology.








(4) Pulmonary infiltrates on CXR


Current Visit: Yes   Status: Acute   


Plan to address problem: 


Patient treated with ceftrioxone, zithromax and vancomycin.


Patient afebrile, No leukocytosis.








Subjective


Date of service: 12/03/19


Principal diagnosis: Ac Hypoxemic Resp Failure; Jesus. Pulm infiltrates; DKA; 

Sepsis


Interval history: 





Patient alert, awake. Patient still complaining shortness of breath on exertion.

Patient is on high flow O2. Vapotherm ,FIO2 40%. O2 saturation running 98%. 

Patient has cough with productive white sputum. No chest pain. History of 

depression and hypertension. Patient admitted for DKA. Patient has history of 

smoking  1/2 a pack a day for 10 years. Complaining nasal congestion. No sore 

throat. 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 

children. Denies alcohol or drug abuse. No known drug allergies.





Objective


                               Vital Signs - 12hr











  12/03/19 12/03/19 12/03/19





  06:00 07:00 08:00


 


Temperature   98.2 F


 


Pulse Rate 92 H 103 H 99 H


 


Pulse Rate [   96 H





From Monitor]   


 


Respiratory 16 21 16





Rate   


 


Blood Pressure   


 


O2 Sat by Pulse 100 100 100





Oximetry   














  12/03/19 12/03/19 12/03/19





  08:53 09:00 10:00


 


Temperature   


 


Pulse Rate  112 H 94 H


 


Pulse Rate [   





From Monitor]   


 


Respiratory  16 15





Rate   


 


Blood Pressure  112/73 113/74


 


O2 Sat by Pulse 95 97 99





Oximetry   














  12/03/19 12/03/19 12/03/19





  10:55 11:00 12:00


 


Temperature   98.3 F


 


Pulse Rate 97 H 96 H 100 H


 


Pulse Rate [   100 H





From Monitor]   


 


Respiratory  17 18





Rate   


 


Blood Pressure 113/74 120/78 110/81


 


O2 Sat by Pulse  96 100





Oximetry   














  12/03/19 12/03/19 12/03/19





  13:00 13:35 14:00


 


Temperature   


 


Pulse Rate 103 H  101 H


 


Pulse Rate [   





From Monitor]   


 


Respiratory 21  20





Rate   


 


Blood Pressure 114/82  114/82


 


O2 Sat by Pulse 99 95 96





Oximetry   














  12/03/19 12/03/19





  16:00 16:04


 


Temperature 98.0 F 


 


Pulse Rate  


 


Pulse Rate [  





From Monitor]  


 


Respiratory  





Rate  


 


Blood Pressure  


 


O2 Sat by Pulse  96





Oximetry  











Constitutional: no acute distress, alert, other (Young male, normocephalic with 

mildly increased respiratory effort at rest)


Eyes: non-icteric


ENT: oropharynx moist, other (Mallampati 2)


Neck: supple, no JVD


Effort: mildly labored


Ascultation: Bilateral: diminished breath sounds, rales


Percussion: Bilateral: not dull


Cardiovascular: regular rate and rhythm, other (S1,S2, no murmurs)


Gastrointestinal: normoactive bowel sounds, soft, non-tender, non-distended


Integumentary: normal


Extremities: no cyanosis, no edema, pulses normal, no ischemia or petechiae


Neurologic: normal mental status, non-focal exam, pupils equal and round, CN II-

XII normal, motor strength normal and


Psychiatric: mood appropriate, anxious


CBC and BMP: 


                                 12/03/19 04:50





                                 12/03/19 04:50


ABG, PT/INR, D-dimer: 


ABG











POC ABG pH  7.453  (7.35-7.45)  H  11/28/19  21:30    


 


POC ABG pCO2  30.0  (35-45)  L  11/28/19  21:30    


 


POC ABG pO2  < 50  ()  L  11/28/19  21:30    


 


POC ABG HCO3  21.0  (22-26 mml/L)   11/28/19  21:30    


 


POC ABG Total CO2  22  (23-27mmol/L)   11/28/19  21:30    


 


POC ABG O2 Sat  76   11/28/19  21:30    





PT/INR, D-dimer











D-Dimer  5088.17 ng/mlDDU (0-234)  H  11/28/19  11:47    








Abnormal lab findings: 


                                  Abnormal Labs











  11/26/19 11/26/19 11/26/19





  18:43 18:43 18:43


 


WBC   


 


RBC   


 


Hgb   


 


Hct   


 


MCHC   


 


Plt Count   


 


Mono % (Auto)   


 


Eos % (Auto)   


 


Eos #   


 


Seg Neutrophils %   


 


Seg Neuts % (Manual)   


 


Lymphocytes % (Manual)   


 


Seg Neutrophils # Man   


 


Lymphocytes # (Manual)   


 


Monocytes # (Manual)   


 


D-Dimer   


 


POC ABG pH   


 


POC ABG pCO2   


 


POC ABG pO2   


 


VBG pH   


 


Sodium   134 L 


 


Potassium   


 


Chloride   92.4 L 


 


Carbon Dioxide   3 L* 


 


BUN   


 


Creatinine   1.6 H 


 


Glucose   581 H* 


 


POC Glucose   


 


Hemoglobin A1c   


 


Lactic Acid   


 


Calcium   


 


Phosphorus  5.50 H  


 


Magnesium   


 


Alkaline Phosphatase    166 H


 


NT-Pro-B Natriuret Pep   


 


Total Protein   


 


Albumin   


 


Lipase   














  11/26/19 11/26/19 11/26/19





  18:43 18:43 19:04


 


WBC   


 


RBC   


 


Hgb   


 


Hct   


 


MCHC   


 


Plt Count   


 


Mono % (Auto)   


 


Eos % (Auto)   


 


Eos #   


 


Seg Neutrophils %   


 


Seg Neuts % (Manual)   


 


Lymphocytes % (Manual)   


 


Seg Neutrophils # Man   


 


Lymphocytes # (Manual)   


 


Monocytes # (Manual)   


 


D-Dimer   


 


POC ABG pH   


 


POC ABG pCO2   


 


POC ABG pO2   


 


VBG pH  6.993 L*  


 


Sodium   


 


Potassium   


 


Chloride   


 


Carbon Dioxide   


 


BUN   


 


Creatinine   


 


Glucose   


 


POC Glucose    437 H


 


Hemoglobin A1c   


 


Lactic Acid   


 


Calcium   


 


Phosphorus   


 


Magnesium   


 


Alkaline Phosphatase   


 


NT-Pro-B Natriuret Pep   


 


Total Protein   


 


Albumin   


 


Lipase   64 H 














  11/26/19 11/26/19 11/26/19





  19:40 20:14 20:45


 


WBC   24.8 H 


 


RBC   


 


Hgb   


 


Hct   


 


MCHC   31 L 


 


Plt Count   


 


Mono % (Auto)   


 


Eos % (Auto)   


 


Eos #   


 


Seg Neutrophils %   


 


Seg Neuts % (Manual)   89.0 H 


 


Lymphocytes % (Manual)   7.0 L 


 


Seg Neutrophils # Man   22.1 H 


 


Lymphocytes # (Manual)   


 


Monocytes # (Manual)   1.0 H 


 


D-Dimer   


 


POC ABG pH   


 


POC ABG pCO2   


 


POC ABG pO2   


 


VBG pH   


 


Sodium  135 L  


 


Potassium   


 


Chloride  94.4 L  


 


Carbon Dioxide  2 L*  


 


BUN   


 


Creatinine  1.6 H  


 


Glucose  536 H*  


 


POC Glucose    411 H


 


Hemoglobin A1c   


 


Lactic Acid   


 


Calcium   


 


Phosphorus  5.10 H  


 


Magnesium   


 


Alkaline Phosphatase   


 


NT-Pro-B Natriuret Pep   


 


Total Protein   


 


Albumin   


 


Lipase   














  11/26/19 11/26/19 11/26/19





  21:25 21:25 21:59


 


WBC   


 


RBC   


 


Hgb   


 


Hct   


 


MCHC   


 


Plt Count   


 


Mono % (Auto)   


 


Eos % (Auto)   


 


Eos #   


 


Seg Neutrophils %   


 


Seg Neuts % (Manual)   


 


Lymphocytes % (Manual)   


 


Seg Neutrophils # Man   


 


Lymphocytes # (Manual)   


 


Monocytes # (Manual)   


 


D-Dimer   


 


POC ABG pH   


 


POC ABG pCO2   


 


POC ABG pO2   


 


VBG pH   


 


Sodium   


 


Potassium  3.5 L  


 


Chloride   


 


Carbon Dioxide  5 L*  


 


BUN   


 


Creatinine   


 


Glucose  370 H  


 


POC Glucose    289 H


 


Hemoglobin A1c   


 


Lactic Acid   2.80 H* 


 


Calcium  8.2 L  


 


Phosphorus   


 


Magnesium   


 


Alkaline Phosphatase   


 


NT-Pro-B Natriuret Pep   


 


Total Protein   


 


Albumin   


 


Lipase   














  11/26/19 11/27/19 11/27/19





  23:16 00:32 00:37


 


WBC   


 


RBC   


 


Hgb   


 


Hct   


 


MCHC   


 


Plt Count   


 


Mono % (Auto)   


 


Eos % (Auto)   


 


Eos #   


 


Seg Neutrophils %   


 


Seg Neuts % (Manual)   


 


Lymphocytes % (Manual)   


 


Seg Neutrophils # Man   


 


Lymphocytes # (Manual)   


 


Monocytes # (Manual)   


 


D-Dimer   


 


POC ABG pH   


 


POC ABG pCO2   


 


POC ABG pO2   


 


VBG pH   


 


Sodium    148 H


 


Potassium    3.0 L


 


Chloride    116.1 H


 


Carbon Dioxide    8 L*


 


BUN   


 


Creatinine   


 


Glucose    135 H


 


POC Glucose  192 H  127 H 


 


Hemoglobin A1c   


 


Lactic Acid   


 


Calcium    7.1 L


 


Phosphorus   


 


Magnesium   


 


Alkaline Phosphatase   


 


NT-Pro-B Natriuret Pep   


 


Total Protein   


 


Albumin   


 


Lipase   














  11/27/19 11/27/19 11/27/19





  01:17 02:17 03:27


 


WBC   


 


RBC   


 


Hgb   


 


Hct   


 


MCHC   


 


Plt Count   


 


Mono % (Auto)   


 


Eos % (Auto)   


 


Eos #   


 


Seg Neutrophils %   


 


Seg Neuts % (Manual)   


 


Lymphocytes % (Manual)   


 


Seg Neutrophils # Man   


 


Lymphocytes # (Manual)   


 


Monocytes # (Manual)   


 


D-Dimer   


 


POC ABG pH   


 


POC ABG pCO2   


 


POC ABG pO2   


 


VBG pH   


 


Sodium   


 


Potassium   


 


Chloride   


 


Carbon Dioxide   


 


BUN   


 


Creatinine   


 


Glucose   


 


POC Glucose  135 H  166 H  254 H


 


Hemoglobin A1c   


 


Lactic Acid   


 


Calcium   


 


Phosphorus   


 


Magnesium   


 


Alkaline Phosphatase   


 


NT-Pro-B Natriuret Pep   


 


Total Protein   


 


Albumin   


 


Lipase   














  11/27/19 11/27/19 11/27/19





  04:13 04:34 05:41


 


WBC   


 


RBC   


 


Hgb   


 


Hct   


 


MCHC   


 


Plt Count   


 


Mono % (Auto)   


 


Eos % (Auto)   


 


Eos #   


 


Seg Neutrophils %   


 


Seg Neuts % (Manual)   


 


Lymphocytes % (Manual)   


 


Seg Neutrophils # Man   


 


Lymphocytes # (Manual)   


 


Monocytes # (Manual)   


 


D-Dimer   


 


POC ABG pH   


 


POC ABG pCO2   


 


POC ABG pO2   


 


VBG pH   


 


Sodium  147 H  


 


Potassium  3.3 L  


 


Chloride  113.4 H  


 


Carbon Dioxide  7 L*  


 


BUN   


 


Creatinine   


 


Glucose  320 H  


 


POC Glucose   320 H  333 H


 


Hemoglobin A1c   


 


Lactic Acid   


 


Calcium  7.5 L  


 


Phosphorus   


 


Magnesium   


 


Alkaline Phosphatase   


 


NT-Pro-B Natriuret Pep   


 


Total Protein   


 


Albumin   


 


Lipase   














  11/27/19 11/27/19 11/27/19





  06:20 07:27 07:53


 


WBC   


 


RBC   


 


Hgb   


 


Hct   


 


MCHC   


 


Plt Count   


 


Mono % (Auto)   


 


Eos % (Auto)   


 


Eos #   


 


Seg Neutrophils %   


 


Seg Neuts % (Manual)   


 


Lymphocytes % (Manual)   


 


Seg Neutrophils # Man   


 


Lymphocytes # (Manual)   


 


Monocytes # (Manual)   


 


D-Dimer   


 


POC ABG pH   


 


POC ABG pCO2   


 


POC ABG pO2   


 


VBG pH   


 


Sodium   


 


Potassium   


 


Chloride   


 


Carbon Dioxide   


 


BUN   


 


Creatinine   


 


Glucose   


 


POC Glucose  363 H  343 H  319 H


 


Hemoglobin A1c   


 


Lactic Acid   


 


Calcium   


 


Phosphorus   


 


Magnesium   


 


Alkaline Phosphatase   


 


NT-Pro-B Natriuret Pep   


 


Total Protein   


 


Albumin   


 


Lipase   














  11/27/19 11/27/19 11/27/19





  08:46 09:45 10:03


 


WBC   


 


RBC   


 


Hgb   


 


Hct   


 


MCHC   


 


Plt Count   


 


Mono % (Auto)   


 


Eos % (Auto)   


 


Eos #   


 


Seg Neutrophils %   


 


Seg Neuts % (Manual)   


 


Lymphocytes % (Manual)   


 


Seg Neutrophils # Man   


 


Lymphocytes # (Manual)   


 


Monocytes # (Manual)   


 


D-Dimer   


 


POC ABG pH   


 


POC ABG pCO2   


 


POC ABG pO2   


 


VBG pH   


 


Sodium    148 H


 


Potassium    3.1 L


 


Chloride    114.1 H


 


Carbon Dioxide    4 L*


 


BUN   


 


Creatinine   


 


Glucose    364 H


 


POC Glucose  402 H  340 H 


 


Hemoglobin A1c   


 


Lactic Acid   


 


Calcium    8.3 L


 


Phosphorus   


 


Magnesium   


 


Alkaline Phosphatase   


 


NT-Pro-B Natriuret Pep   


 


Total Protein   


 


Albumin   


 


Lipase   














  11/27/19 11/27/19 11/27/19





  10:42 11:26 12:53


 


WBC   


 


RBC   


 


Hgb   


 


Hct   


 


MCHC   


 


Plt Count   


 


Mono % (Auto)   


 


Eos % (Auto)   


 


Eos #   


 


Seg Neutrophils %   


 


Seg Neuts % (Manual)   


 


Lymphocytes % (Manual)   


 


Seg Neutrophils # Man   


 


Lymphocytes # (Manual)   


 


Monocytes # (Manual)   


 


D-Dimer   


 


POC ABG pH   


 


POC ABG pCO2   


 


POC ABG pO2   


 


VBG pH   


 


Sodium   


 


Potassium   


 


Chloride   


 


Carbon Dioxide   


 


BUN   


 


Creatinine   


 


Glucose   


 


POC Glucose  321 H  241 H  175 H


 


Hemoglobin A1c   


 


Lactic Acid   


 


Calcium   


 


Phosphorus   


 


Magnesium   


 


Alkaline Phosphatase   


 


NT-Pro-B Natriuret Pep   


 


Total Protein   


 


Albumin   


 


Lipase   














  11/27/19 11/27/19 11/27/19





  13:36 13:47 17:36


 


WBC   


 


RBC   


 


Hgb   


 


Hct   


 


MCHC   


 


Plt Count   


 


Mono % (Auto)   


 


Eos % (Auto)   


 


Eos #   


 


Seg Neutrophils %   


 


Seg Neuts % (Manual)   


 


Lymphocytes % (Manual)   


 


Seg Neutrophils # Man   


 


Lymphocytes # (Manual)   


 


Monocytes # (Manual)   


 


D-Dimer   


 


POC ABG pH   


 


POC ABG pCO2   


 


POC ABG pO2   


 


VBG pH   


 


Sodium  151 H  


 


Potassium  3.1 L  


 


Chloride  126.5 H  


 


Carbon Dioxide  12 L D  


 


BUN   


 


Creatinine   


 


Glucose  131 H  


 


POC Glucose   120 H  107 H


 


Hemoglobin A1c   


 


Lactic Acid   


 


Calcium  8.2 L  


 


Phosphorus   


 


Magnesium   


 


Alkaline Phosphatase   


 


NT-Pro-B Natriuret Pep   


 


Total Protein   


 


Albumin   


 


Lipase   














  11/27/19 11/27/19 11/27/19





  18:59 20:32 22:05


 


WBC   


 


RBC   


 


Hgb   


 


Hct   


 


MCHC   


 


Plt Count   


 


Mono % (Auto)   


 


Eos % (Auto)   


 


Eos #   


 


Seg Neutrophils %   


 


Seg Neuts % (Manual)   


 


Lymphocytes % (Manual)   


 


Seg Neutrophils # Man   


 


Lymphocytes # (Manual)   


 


Monocytes # (Manual)   


 


D-Dimer   


 


POC ABG pH   


 


POC ABG pCO2   


 


POC ABG pO2   


 


VBG pH   


 


Sodium   146 H 


 


Potassium   


 


Chloride   116.4 H 


 


Carbon Dioxide   10 L 


 


BUN   


 


Creatinine   


 


Glucose   267 H 


 


POC Glucose  153 H   336 H


 


Hemoglobin A1c   


 


Lactic Acid   


 


Calcium   


 


Phosphorus   


 


Magnesium   


 


Alkaline Phosphatase   


 


NT-Pro-B Natriuret Pep   


 


Total Protein   


 


Albumin   


 


Lipase   














  11/28/19 11/28/19 11/28/19





  05:00 07:47 11:21


 


WBC   


 


RBC   


 


Hgb   


 


Hct   


 


MCHC   


 


Plt Count   


 


Mono % (Auto)   


 


Eos % (Auto)   


 


Eos #   


 


Seg Neutrophils %   


 


Seg Neuts % (Manual)   


 


Lymphocytes % (Manual)   


 


Seg Neutrophils # Man   


 


Lymphocytes # (Manual)   


 


Monocytes # (Manual)   


 


D-Dimer   


 


POC ABG pH   


 


POC ABG pCO2   


 


POC ABG pO2   


 


VBG pH   


 


Sodium   


 


Potassium  3.3 L  


 


Chloride  111.1 H  


 


Carbon Dioxide  7 L*  


 


BUN  7 L  


 


Creatinine   


 


Glucose  290 H  


 


POC Glucose   328 H  390 H


 


Hemoglobin A1c   


 


Lactic Acid   


 


Calcium   


 


Phosphorus  1.30 L D  


 


Magnesium   


 


Alkaline Phosphatase   


 


NT-Pro-B Natriuret Pep   


 


Total Protein   


 


Albumin   


 


Lipase   














  11/28/19 11/28/19 11/28/19





  11:47 11:47 11:54


 


WBC   


 


RBC   


 


Hgb   


 


Hct   


 


MCHC   


 


Plt Count   


 


Mono % (Auto)   


 


Eos % (Auto)   


 


Eos #   


 


Seg Neutrophils %   


 


Seg Neuts % (Manual)   


 


Lymphocytes % (Manual)   


 


Seg Neutrophils # Man   


 


Lymphocytes # (Manual)   


 


Monocytes # (Manual)   


 


D-Dimer   5088.17 H 


 


POC ABG pH   


 


POC ABG pCO2   


 


POC ABG pO2   


 


VBG pH   


 


Sodium   


 


Potassium   


 


Chloride   


 


Carbon Dioxide  5 L*  


 


BUN  7 L  


 


Creatinine   


 


Glucose   


 


POC Glucose   


 


Hemoglobin A1c    15.1 H


 


Lactic Acid   


 


Calcium   


 


Phosphorus   


 


Magnesium   


 


Alkaline Phosphatase  150 H  


 


NT-Pro-B Natriuret Pep   


 


Total Protein  5.6 L D  


 


Albumin  3.4 L  


 


Lipase   














  11/28/19 11/28/19 11/28/19





  11:54 13:04 14:00


 


WBC  17.7 H  


 


RBC   


 


Hgb   


 


Hct   


 


MCHC   


 


Plt Count   


 


Mono % (Auto)   


 


Eos % (Auto)   


 


Eos #   


 


Seg Neutrophils %   


 


Seg Neuts % (Manual)   


 


Lymphocytes % (Manual)  4.0 L  


 


Seg Neutrophils # Man  12.2 H  


 


Lymphocytes # (Manual)  0.7 L  


 


Monocytes # (Manual)   


 


D-Dimer   


 


POC ABG pH   7.076 L 


 


POC ABG pCO2   27.9 L 


 


POC ABG pO2   71 L 


 


VBG pH   


 


Sodium   


 


Potassium   


 


Chloride   


 


Carbon Dioxide   


 


BUN   


 


Creatinine   


 


Glucose   


 


POC Glucose    371 H


 


Hemoglobin A1c   


 


Lactic Acid   


 


Calcium   


 


Phosphorus   


 


Magnesium   


 


Alkaline Phosphatase   


 


NT-Pro-B Natriuret Pep   


 


Total Protein   


 


Albumin   


 


Lipase   














  11/28/19 11/28/19 11/28/19





  15:13 16:09 17:13


 


WBC   


 


RBC   


 


Hgb   


 


Hct   


 


MCHC   


 


Plt Count   


 


Mono % (Auto)   


 


Eos % (Auto)   


 


Eos #   


 


Seg Neutrophils %   


 


Seg Neuts % (Manual)   


 


Lymphocytes % (Manual)   


 


Seg Neutrophils # Man   


 


Lymphocytes # (Manual)   


 


Monocytes # (Manual)   


 


D-Dimer   


 


POC ABG pH   


 


POC ABG pCO2   


 


POC ABG pO2   


 


VBG pH   


 


Sodium   


 


Potassium   


 


Chloride   


 


Carbon Dioxide   


 


BUN   


 


Creatinine   


 


Glucose   


 


POC Glucose  259 H  186 H  138 H


 


Hemoglobin A1c   


 


Lactic Acid   


 


Calcium   


 


Phosphorus   


 


Magnesium   


 


Alkaline Phosphatase   


 


NT-Pro-B Natriuret Pep   


 


Total Protein   


 


Albumin   


 


Lipase   














  11/28/19 11/28/19 11/28/19





  18:12 18:43 18:43


 


WBC   


 


RBC   


 


Hgb   


 


Hct   


 


MCHC   


 


Plt Count   


 


Mono % (Auto)   


 


Eos % (Auto)   


 


Eos #   


 


Seg Neutrophils %   


 


Seg Neuts % (Manual)   


 


Lymphocytes % (Manual)   


 


Seg Neutrophils # Man   


 


Lymphocytes # (Manual)   


 


Monocytes # (Manual)   


 


D-Dimer   


 


POC ABG pH   


 


POC ABG pCO2   


 


POC ABG pO2   


 


VBG pH   


 


Sodium   


 


Potassium    3.0 L


 


Chloride   


 


Carbon Dioxide    15 L D


 


BUN    7 L


 


Creatinine   


 


Glucose    132 H


 


POC Glucose  121 H  


 


Hemoglobin A1c   


 


Lactic Acid   


 


Calcium    8.3 L


 


Phosphorus   1.70 L D 


 


Magnesium   1.50 L 


 


Alkaline Phosphatase   


 


NT-Pro-B Natriuret Pep   


 


Total Protein   


 


Albumin   


 


Lipase   














  11/28/19 11/28/19 11/28/19





  18:43 18:43 19:20


 


WBC   


 


RBC   


 


Hgb   


 


Hct   


 


MCHC   


 


Plt Count   


 


Mono % (Auto)   


 


Eos % (Auto)   


 


Eos #   


 


Seg Neutrophils %   


 


Seg Neuts % (Manual)   


 


Lymphocytes % (Manual)   


 


Seg Neutrophils # Man   


 


Lymphocytes # (Manual)   


 


Monocytes # (Manual)   


 


D-Dimer   


 


POC ABG pH   


 


POC ABG pCO2   


 


POC ABG pO2   


 


VBG pH   


 


Sodium   


 


Potassium   


 


Chloride   


 


Carbon Dioxide   


 


BUN   


 


Creatinine   


 


Glucose   


 


POC Glucose    120 H


 


Hemoglobin A1c   


 


Lactic Acid  4.00 H*  


 


Calcium   


 


Phosphorus   


 


Magnesium   


 


Alkaline Phosphatase   


 


NT-Pro-B Natriuret Pep   1292 H 


 


Total Protein   


 


Albumin   


 


Lipase   














  11/28/19 11/28/19 11/28/19





  20:55 21:30 21:33


 


WBC   


 


RBC   


 


Hgb   


 


Hct   


 


MCHC   


 


Plt Count   


 


Mono % (Auto)   


 


Eos % (Auto)   


 


Eos #   


 


Seg Neutrophils %   


 


Seg Neuts % (Manual)   


 


Lymphocytes % (Manual)   


 


Seg Neutrophils # Man   


 


Lymphocytes # (Manual)   


 


Monocytes # (Manual)   


 


D-Dimer   


 


POC ABG pH   7.453 H 


 


POC ABG pCO2  30.5 L  30.0 L 


 


POC ABG pO2   < 50 L 


 


VBG pH   


 


Sodium   


 


Potassium   


 


Chloride   


 


Carbon Dioxide   


 


BUN   


 


Creatinine   


 


Glucose   


 


POC Glucose    121 H


 


Hemoglobin A1c   


 


Lactic Acid   


 


Calcium   


 


Phosphorus   


 


Magnesium   


 


Alkaline Phosphatase   


 


NT-Pro-B Natriuret Pep   


 


Total Protein   


 


Albumin   


 


Lipase   














  11/28/19 11/28/19 11/28/19





  21:59 22:40 22:53


 


WBC   


 


RBC   


 


Hgb   


 


Hct   


 


MCHC   


 


Plt Count   


 


Mono % (Auto)   


 


Eos % (Auto)   


 


Eos #   


 


Seg Neutrophils %   


 


Seg Neuts % (Manual)   


 


Lymphocytes % (Manual)   


 


Seg Neutrophils # Man   


 


Lymphocytes # (Manual)   


 


Monocytes # (Manual)   


 


D-Dimer   


 


POC ABG pH   


 


POC ABG pCO2   


 


POC ABG pO2   


 


VBG pH   


 


Sodium   


 


Potassium  3.0 L   2.6 L*


 


Chloride  107.9 H  


 


Carbon Dioxide  19 L   21 L


 


BUN  7 L   7 L


 


Creatinine   


 


Glucose  138 H   148 H


 


POC Glucose   137 H 


 


Hemoglobin A1c   


 


Lactic Acid   


 


Calcium  7.9 L   7.9 L


 


Phosphorus   


 


Magnesium   


 


Alkaline Phosphatase   


 


NT-Pro-B Natriuret Pep   


 


Total Protein   


 


Albumin   


 


Lipase   














  11/28/19 11/29/19 11/29/19





  23:41 02:54 03:51


 


WBC   


 


RBC   


 


Hgb   


 


Hct   


 


MCHC   


 


Plt Count   


 


Mono % (Auto)   


 


Eos % (Auto)   


 


Eos #   


 


Seg Neutrophils %   


 


Seg Neuts % (Manual)   


 


Lymphocytes % (Manual)   


 


Seg Neutrophils # Man   


 


Lymphocytes # (Manual)   


 


Monocytes # (Manual)   


 


D-Dimer   


 


POC ABG pH   


 


POC ABG pCO2   


 


POC ABG pO2   


 


VBG pH   


 


Sodium   


 


Potassium   


 


Chloride   


 


Carbon Dioxide   


 


BUN   


 


Creatinine   


 


Glucose   


 


POC Glucose  115 H  143 H  126 H


 


Hemoglobin A1c   


 


Lactic Acid   


 


Calcium   


 


Phosphorus   


 


Magnesium   


 


Alkaline Phosphatase   


 


NT-Pro-B Natriuret Pep   


 


Total Protein   


 


Albumin   


 


Lipase   














  11/29/19 11/29/19 11/29/19





  04:42 05:06 05:44


 


WBC   


 


RBC   


 


Hgb   


 


Hct   


 


MCHC   


 


Plt Count   


 


Mono % (Auto)   


 


Eos % (Auto)   


 


Eos #   


 


Seg Neutrophils %   


 


Seg Neuts % (Manual)   


 


Lymphocytes % (Manual)   


 


Seg Neutrophils # Man   


 


Lymphocytes # (Manual)   


 


Monocytes # (Manual)   


 


D-Dimer   


 


POC ABG pH   


 


POC ABG pCO2   


 


POC ABG pO2   


 


VBG pH   


 


Sodium   


 


Potassium   3.0 L 


 


Chloride   108.2 H 


 


Carbon Dioxide   19 L 


 


BUN   7 L 


 


Creatinine   


 


Glucose   173 H 


 


POC Glucose  127 H   163 H


 


Hemoglobin A1c   


 


Lactic Acid   


 


Calcium   8.1 L 


 


Phosphorus   1.40 L 


 


Magnesium   1.50 L 


 


Alkaline Phosphatase   


 


NT-Pro-B Natriuret Pep   


 


Total Protein   


 


Albumin   


 


Lipase   














  11/29/19 11/29/19 11/29/19





  06:41 08:36 09:31


 


WBC   


 


RBC   


 


Hgb   


 


Hct   


 


MCHC   


 


Plt Count   


 


Mono % (Auto)   


 


Eos % (Auto)   


 


Eos #   


 


Seg Neutrophils %   


 


Seg Neuts % (Manual)   


 


Lymphocytes % (Manual)   


 


Seg Neutrophils # Man   


 


Lymphocytes # (Manual)   


 


Monocytes # (Manual)   


 


D-Dimer   


 


POC ABG pH   


 


POC ABG pCO2   


 


POC ABG pO2   


 


VBG pH   


 


Sodium   


 


Potassium   


 


Chloride   


 


Carbon Dioxide   


 


BUN   


 


Creatinine   


 


Glucose   


 


POC Glucose  159 H  132 H  148 H


 


Hemoglobin A1c   


 


Lactic Acid   


 


Calcium   


 


Phosphorus   


 


Magnesium   


 


Alkaline Phosphatase   


 


NT-Pro-B Natriuret Pep   


 


Total Protein   


 


Albumin   


 


Lipase   














  11/29/19 11/29/19 11/29/19





  10:42 11:53 13:28


 


WBC   


 


RBC   


 


Hgb   


 


Hct   


 


MCHC   


 


Plt Count   


 


Mono % (Auto)   


 


Eos % (Auto)   


 


Eos #   


 


Seg Neutrophils %   


 


Seg Neuts % (Manual)   


 


Lymphocytes % (Manual)   


 


Seg Neutrophils # Man   


 


Lymphocytes # (Manual)   


 


Monocytes # (Manual)   


 


D-Dimer   


 


POC ABG pH   


 


POC ABG pCO2   


 


POC ABG pO2   


 


VBG pH   


 


Sodium   


 


Potassium    3.2 L


 


Chloride   


 


Carbon Dioxide    14 L


 


BUN    7 L


 


Creatinine   


 


Glucose    256 H


 


POC Glucose  191 H  181 H 


 


Hemoglobin A1c   


 


Lactic Acid   


 


Calcium    8.0 L


 


Phosphorus   


 


Magnesium   


 


Alkaline Phosphatase   


 


NT-Pro-B Natriuret Pep   


 


Total Protein   


 


Albumin   


 


Lipase   














  11/29/19 11/29/19 11/30/19





  16:14 22:24 06:25


 


WBC   


 


RBC   


 


Hgb   


 


Hct   


 


MCHC   


 


Plt Count   


 


Mono % (Auto)   


 


Eos % (Auto)   


 


Eos #   


 


Seg Neutrophils %   


 


Seg Neuts % (Manual)   


 


Lymphocytes % (Manual)   


 


Seg Neutrophils # Man   


 


Lymphocytes # (Manual)   


 


Monocytes # (Manual)   


 


D-Dimer   


 


POC ABG pH   


 


POC ABG pCO2   


 


POC ABG pO2   


 


VBG pH   


 


Sodium    148 H


 


Potassium    3.2 L


 


Chloride    116.3 H


 


Carbon Dioxide    17 L


 


BUN    7 L


 


Creatinine   


 


Glucose    192 H


 


POC Glucose  334 H  106 H 


 


Hemoglobin A1c   


 


Lactic Acid   


 


Calcium    8.3 L


 


Phosphorus   


 


Magnesium   


 


Alkaline Phosphatase   


 


NT-Pro-B Natriuret Pep   


 


Total Protein   


 


Albumin   


 


Lipase   














  11/30/19 11/30/19 11/30/19





  07:38 12:48 16:24


 


WBC   


 


RBC   


 


Hgb   


 


Hct   


 


MCHC   


 


Plt Count   


 


Mono % (Auto)   


 


Eos % (Auto)   


 


Eos #   


 


Seg Neutrophils %   


 


Seg Neuts % (Manual)   


 


Lymphocytes % (Manual)   


 


Seg Neutrophils # Man   


 


Lymphocytes # (Manual)   


 


Monocytes # (Manual)   


 


D-Dimer   


 


POC ABG pH   


 


POC ABG pCO2   


 


POC ABG pO2   


 


VBG pH   


 


Sodium   


 


Potassium   


 


Chloride   


 


Carbon Dioxide   


 


BUN   


 


Creatinine   


 


Glucose   


 


POC Glucose  200 H  156 H  223 H


 


Hemoglobin A1c   


 


Lactic Acid   


 


Calcium   


 


Phosphorus   


 


Magnesium   


 


Alkaline Phosphatase   


 


NT-Pro-B Natriuret Pep   


 


Total Protein   


 


Albumin   


 


Lipase   














  11/30/19 12/01/19 12/01/19





  21:27 06:00 09:30


 


WBC   


 


RBC   


 


Hgb    11.2 L


 


Hct    33.7 L D


 


MCHC   


 


Plt Count    58 L


 


[place] % (Auto)   


 


Eos % (Auto)    4.7 H


 


Eos #   


 


Seg Neutrophils %    75.7 H


 


Seg Neuts % (Manual)   


 


Lymphocytes % (Manual)   


 


Seg Neutrophils # Man   


 


Lymphocytes # (Manual)   


 


Monocytes # (Manual)   


 


D-Dimer   


 


POC ABG pH   


 


POC ABG pCO2   


 


POC ABG pO2   


 


VBG pH   


 


Sodium   


 


Potassium   2.5 L* D 


 


Chloride   


 


Carbon Dioxide   19 L 


 


BUN   5 L 


 


Creatinine   


 


Glucose   42 L 


 


POC Glucose  219 H  


 


Hemoglobin A1c   


 


Lactic Acid   


 


Calcium   8.1 L 


 


Phosphorus   


 


Magnesium   


 


Alkaline Phosphatase   


 


NT-Pro-B Natriuret Pep   


 


Total Protein   


 


Albumin   


 


Lipase   














  12/01/19 12/01/19 12/02/19





  11:57 22:08 01:15


 


WBC   


 


RBC   


 


Hgb   


 


Hct   


 


MCHC   


 


Plt Count   


 


Mono % (Auto)   


 


Eos % (Auto)   


 


Eos #   


 


Seg Neutrophils %   


 


Seg Neuts % (Manual)   


 


Lymphocytes % (Manual)   


 


Seg Neutrophils # Man   


 


Lymphocytes # (Manual)   


 


Monocytes # (Manual)   


 


D-Dimer   


 


POC ABG pH   


 


POC ABG pCO2   


 


POC ABG pO2   


 


VBG pH   


 


Sodium   


 


Potassium   


 


Chloride   


 


Carbon Dioxide   


 


BUN   


 


Creatinine   


 


Glucose   


 


POC Glucose  237 H  55 L  < 40 L


 


Hemoglobin A1c   


 


Lactic Acid   


 


Calcium   


 


Phosphorus   


 


Magnesium   


 


Alkaline Phosphatase   


 


NT-Pro-B Natriuret Pep   


 


Total Protein   


 


Albumin   


 


Lipase   














  12/02/19 12/02/19 12/02/19





  02:05 07:49 11:46


 


WBC   


 


RBC   


 


Hgb   


 


Hct   


 


MCHC   


 


Plt Count   


 


Mono % (Auto)   


 


Eos % (Auto)   


 


Eos #   


 


Seg Neutrophils %   


 


Seg Neuts % (Manual)   


 


Lymphocytes % (Manual)   


 


Seg Neutrophils # Man   


 


Lymphocytes # (Manual)   


 


Monocytes # (Manual)   


 


D-Dimer   


 


POC ABG pH   


 


POC ABG pCO2   


 


POC ABG pO2   


 


VBG pH   


 


Sodium   


 


Potassium   2.9 L* 


 


Chloride   


 


Carbon Dioxide   20 L 


 


BUN   3 L 


 


Creatinine   


 


Glucose   188 H 


 


POC Glucose  159 H   182 H


 


Hemoglobin A1c   


 


Lactic Acid   


 


Calcium   8.1 L 


 


Phosphorus   


 


Magnesium   


 


Alkaline Phosphatase   


 


NT-Pro-B Natriuret Pep   


 


Total Protein   


 


Albumin   


 


Lipase   














  12/02/19 12/02/19 12/02/19





  17:06 18:08 21:55


 


WBC   


 


RBC   


 


Hgb   


 


Hct   


 


MCHC   


 


Plt Count   


 


Mono % (Auto)   


 


Eos % (Auto)   


 


Eos #   


 


Seg Neutrophils %   


 


Seg Neuts % (Manual)   


 


Lymphocytes % (Manual)   


 


Seg Neutrophils # Man   


 


Lymphocytes # (Manual)   


 


Monocytes # (Manual)   


 


D-Dimer   


 


POC ABG pH   


 


POC ABG pCO2   


 


POC ABG pO2   


 


VBG pH   


 


Sodium   


 


Potassium   


 


Chloride   


 


Carbon Dioxide   


 


BUN   


 


Creatinine   


 


Glucose   


 


POC Glucose  < 40 L  111 H  255 H


 


Hemoglobin A1c   


 


Lactic Acid   


 


Calcium   


 


Phosphorus   


 


Magnesium   


 


Alkaline Phosphatase   


 


NT-Pro-B Natriuret Pep   


 


Total Protein   


 


Albumin   


 


Lipase   














  12/02/19 12/03/19 12/03/19





  Unknown 04:50 04:50


 


WBC   


 


RBC   3.26 L 


 


Hgb  11.1 L  9.3 L 


 


Hct  32.7 L  27.6 L 


 


MCHC   


 


Plt Count  38 L  30 L 


 


Mono % (Auto)  7.4 H  


 


Eos % (Auto)  9.0 H  


 


Eos #  0.6 H  


 


Seg Neutrophils %   


 


Seg Neuts % (Manual)   


 


Lymphocytes % (Manual)   


 


Seg Neutrophils # Man   


 


Lymphocytes # (Manual)   


 


Monocytes # (Manual)   


 


D-Dimer   


 


POC ABG pH   


 


POC ABG pCO2   


 


POC ABG pO2   


 


VBG pH   


 


Sodium   


 


Potassium    3.3 L D


 


Chloride    112.5 H


 


Carbon Dioxide   


 


BUN    4 L


 


Creatinine   


 


Glucose    275 H


 


POC Glucose   


 


Hemoglobin A1c   


 


Lactic Acid   


 


Calcium    7.4 L


 


Phosphorus   


 


Magnesium   


 


Alkaline Phosphatase   


 


NT-Pro-B Natriuret Pep   


 


Total Protein   


 


Albumin   


 


Lipase   














  12/03/19 12/03/19





  07:36 11:29


 


WBC  


 


RBC  


 


Hgb  


 


Hct  


 


MCHC  


 


Plt Count  


 


Mono % (Auto)  


 


Eos % (Auto)  


 


Eos #  


 


Seg Neutrophils %  


 


Seg Neuts % (Manual)  


 


Lymphocytes % (Manual)  


 


Seg Neutrophils # Man  


 


Lymphocytes # (Manual)  


 


Monocytes # (Manual)  


 


D-Dimer  


 


POC ABG pH  


 


POC ABG pCO2  


 


POC ABG pO2  


 


VBG pH  


 


Sodium  


 


Potassium  


 


Chloride  


 


Carbon Dioxide  


 


BUN  


 


Creatinine  


 


Glucose  


 


POC Glucose  294 H  460 H


 


Hemoglobin A1c  


 


Lactic Acid  


 


Calcium  


 


Phosphorus  


 


Magnesium  


 


Alkaline Phosphatase  


 


NT-Pro-B Natriuret Pep  


 


Total Protein  


 


Albumin  


 


Lipase  











Allied health notes reviewed: nursing

## 2019-12-04 LAB
BUN SERPL-MCNC: 3 MG/DL (ref 9–20)
BUN/CREAT SERPL: 4 %
CALCIUM SERPL-MCNC: 7.7 MG/DL (ref 8.4–10.2)
HCT VFR BLD CALC: 29.5 % (ref 35.5–45.6)
HEMOLYSIS INDEX: 3
HGB BLD-MCNC: 9.9 GM/DL (ref 11.8–15.2)
MCHC RBC AUTO-ENTMCNC: 34 % (ref 32–34)
MCV RBC AUTO: 85 FL (ref 84–94)
PLATELET # BLD: 79 K/MM3 (ref 140–440)
RBC # BLD AUTO: 3.48 M/MM3 (ref 3.65–5.03)

## 2019-12-04 RX ADMIN — Medication SCH ML: at 22:01

## 2019-12-04 RX ADMIN — LOPERAMIDE HYDROCHLORIDE PRN MG: 1 SOLUTION ORAL at 22:17

## 2019-12-04 RX ADMIN — POTASSIUM CHLORIDE SCH MLS/HR: 2 INJECTION, SOLUTION, CONCENTRATE INTRAVENOUS at 18:22

## 2019-12-04 RX ADMIN — POTASSIUM CHLORIDE SCH MLS/HR: 2 INJECTION, SOLUTION, CONCENTRATE INTRAVENOUS at 06:06

## 2019-12-04 RX ADMIN — ENOXAPARIN SODIUM SCH MG: 100 INJECTION SUBCUTANEOUS at 09:05

## 2019-12-04 RX ADMIN — INSULIN LISPRO SCH: 100 INJECTION, SOLUTION INTRAVENOUS; SUBCUTANEOUS at 16:02

## 2019-12-04 RX ADMIN — INSULIN LISPRO SCH: 100 INJECTION, SOLUTION INTRAVENOUS; SUBCUTANEOUS at 17:37

## 2019-12-04 RX ADMIN — LOPERAMIDE HYDROCHLORIDE PRN MG: 1 SOLUTION ORAL at 09:12

## 2019-12-04 RX ADMIN — INSULIN LISPRO SCH UNIT: 100 INJECTION, SOLUTION INTRAVENOUS; SUBCUTANEOUS at 22:00

## 2019-12-04 RX ADMIN — METOPROLOL TARTRATE SCH: 25 TABLET, FILM COATED ORAL at 21:57

## 2019-12-04 RX ADMIN — Medication SCH ML: at 09:07

## 2019-12-04 RX ADMIN — INSULIN LISPRO SCH UNIT: 100 INJECTION, SOLUTION INTRAVENOUS; SUBCUTANEOUS at 08:00

## 2019-12-04 RX ADMIN — METOPROLOL TARTRATE SCH MG: 25 TABLET, FILM COATED ORAL at 09:08

## 2019-12-04 RX ADMIN — INSULIN LISPRO SCH: 100 INJECTION, SOLUTION INTRAVENOUS; SUBCUTANEOUS at 07:30

## 2019-12-04 RX ADMIN — FAMOTIDINE SCH MG: 20 TABLET ORAL at 09:08

## 2019-12-04 RX ADMIN — INSULIN LISPRO SCH UNIT: 100 INJECTION, SOLUTION INTRAVENOUS; SUBCUTANEOUS at 12:00

## 2019-12-04 RX ADMIN — INSULIN LISPRO SCH: 100 INJECTION, SOLUTION INTRAVENOUS; SUBCUTANEOUS at 11:30

## 2019-12-04 RX ADMIN — ZOLPIDEM TARTRATE PRN MG: 5 TABLET ORAL at 22:17

## 2019-12-04 RX ADMIN — CHOLESTYRAMINE SCH GM: 4 POWDER, FOR SUSPENSION ORAL at 09:04

## 2019-12-04 RX ADMIN — METOPROLOL TARTRATE SCH MG: 25 TABLET, FILM COATED ORAL at 22:00

## 2019-12-04 RX ADMIN — CHOLESTYRAMINE SCH GM: 4 POWDER, FOR SUSPENSION ORAL at 21:56

## 2019-12-04 RX ADMIN — FAMOTIDINE SCH MG: 20 TABLET ORAL at 21:59

## 2019-12-04 NOTE — PROGRESS NOTE
Assessment and Plan


Assessment and plan: 





Acute Hypoxemic Respiratory Failure


-probably due to undiagnosed severe COPD with acute exacerbation


-CTA chest showed interstitial and alveolar edema with small bilateral effusion


-was on high flow 02 and transitioned to nasal cannula this am. cont 02 wean as 

tolerated


-cont neb tx, off antibiotic





DM with hypoglycemia/hyperglycemia


-Blood glucose fluctuating due to diet noncompliance. It was reported that the 

pt sometimes orders his food from outside the hospital.


-Lantus changed to 10U QHS


-Patient counseled


-Monitor blood glucose





Acute thrombocytopenia


-Patient received Lovenox during this admission and his plt level trended down 

from 347 to 30


-HIT test pending


-No evidence of overt bleeding


-Will monitor platelet level





Sinus tachycardia


-improved


-cont metoprolol and monitor





DKA (diabetic ketoacidosis)


-Resolved





Severe metabolic acidosis


-Resolved





Hypernatremia 2/2 dehydration


-Resolved





Hypophosphatemia


-Repleted and resolved





Hypomagnesemia


-Repleted and resolved





ARIANNA (acute kidney injury) probably vasomotor nephropathy due to dehydration


-resolved





Presumed Severe sepsis with organ failure


-Patient treated with ceftrioxone, Zithromax and vancomycin.


-Repeat Chest x-ray showed bilateral airspace opacities


-Blood cultures negative for any growth





Chronic Diastolic Heart failure with chronic interstitial edema


-Stable


-Echo showed EF of 60-65% with diastolic dysfunction





Severe Protein calorie Malnutrition


-On oral supplements


-Nutrition following





Tobacco abuse


-counseled on cessation








DVT prophylaxis: SCD








Disposition: Will transfer patient out of the ICU to the telemetry floor.  Plan 

is to discharge patient when he's stable on nasal cannula and HIT test available





Time spent: 35 mins














History


Interval history: 





Patient's shortness of breath has improved.  This am, he was transitioned to 

nasal cannula from high flow oxygen.





Hospitalist Physical





- Constitutional


Vitals: 


                                        











Temp Pulse Resp BP Pulse Ox


 


 98.5 F   91 H  20   105/74   95 


 


 12/04/19 12:00  12/04/19 12:00  12/04/19 08:00  12/04/19 12:00  12/04/19 12:00











General appearance: Present: no acute distress, cachectic





- EENT


Eyes: Present: PERRL, EOM intact


ENT: hearing intact, clear oral mucosa





- Neck


Neck: Present: supple





- Respiratory


Respiratory effort: normal


Respiratory: bilateral: diminished, negative: rales, wheezing





- Cardiovascular


Rhythm: regular


Heart Sounds: Present: S1 & S2





- Extremities


Extremities: No edema





- Abdominal


General gastrointestinal: soft, non-tender, non-distended, normal bowel sounds





- Integumentary


Integumentary: Present: warm, dry





- Psychiatric


Psychiatric: appropriate mood/affect





- Neurologic


Neurologic: CNII-XII intact





Results





- Labs


CBC & Chem 7: 


                                 12/03/19 04:50





                                 12/04/19 03:36


Labs: 


                             Laboratory Last Values











WBC  4.9 K/mm3 (4.5-11.0)   12/03/19  04:50    


 


RBC  3.26 M/mm3 (3.65-5.03)  L  12/03/19  04:50    


 


Hgb  9.3 gm/dl (11.8-15.2)  L  12/03/19  04:50    


 


Hct  27.6 % (35.5-45.6)  L  12/03/19  04:50    


 


MCV  85 fl (84-94)   12/03/19  04:50    


 


MCH  28 pg (28-32)   12/03/19  04:50    


 


MCHC  34 % (32-34)   12/03/19  04:50    


 


RDW  15.0 % (13.2-15.2)   12/03/19  04:50    


 


Plt Count  30 K/mm3 (140-440)  L  12/03/19  04:50    


 


Lymph % (Auto)  16.1 % (13.4-35.0)   12/02/19  Unknown


 


Mono % (Auto)  7.4 % (0.0-7.3)  H  12/02/19  Unknown


 


Eos % (Auto)  9.0 % (0.0-4.3)  H  12/02/19  Unknown


 


Baso % (Auto)  0.3 % (0.0-1.8)   12/02/19  Unknown


 


Lymph #  1.2 K/mm3 (1.2-5.4)   12/02/19  Unknown


 


Mono #  0.5 K/mm3 (0.0-0.8)   12/02/19  Unknown


 


Eos #  0.6 K/mm3 (0.0-0.4)  H  12/02/19  Unknown


 


Baso #  0.0 K/mm3 (0.0-0.1)   12/02/19  Unknown


 


Add Manual Diff  Complete   11/28/19  11:54    


 


Total Counted  100   11/28/19  11:54    


 


Seg Neutrophils %  67.2 % (40.0-70.0)   12/02/19  Unknown


 


Seg Neuts % (Manual)  69.0 % (40.0-70.0)   11/28/19  11:54    


 


Band Neutrophils %  23.0 %  11/28/19  11:54    


 


Lymphocytes % (Manual)  4.0 % (13.4-35.0)  L  11/28/19  11:54    


 


Reactive Lymphs % (Man)  0 %  11/28/19  11:54    


 


Monocytes % (Manual)  0 % (0.0-7.3)   11/28/19  11:54    


 


Eosinophils % (Manual)  0 % (0.0-4.3)   11/28/19  11:54    


 


Basophils % (Manual)  0 % (0.0-1.8)   11/28/19  11:54    


 


Metamyelocytes %  4.0 %  11/28/19  11:54    


 


Myelocytes %  0 %  11/28/19  11:54    


 


Promyelocytes %  0 %  11/28/19  11:54    


 


Blast Cells %  0 %  11/28/19  11:54    


 


Nucleated RBC %  Not Reportable   11/28/19  11:54    


 


Seg Neutrophils #  4.8 K/mm3 (1.8-7.7)   12/02/19  Unknown


 


Seg Neutrophils # Man  12.2 K/mm3 (1.8-7.7)  H  11/28/19  11:54    


 


Band Neutrophils #  4.1 K/mm3  11/28/19  11:54    


 


Lymphocytes # (Manual)  0.7 K/mm3 (1.2-5.4)  L  11/28/19  11:54    


 


Abs React Lymphs (Man)  0.0 K/mm3  11/28/19  11:54    


 


Monocytes # (Manual)  0.0 K/mm3 (0.0-0.8)   11/28/19  11:54    


 


Eosinophils # (Manual)  0.0 K/mm3 (0.0-0.4)   11/28/19  11:54    


 


Basophils # (Manual)  0.0 K/mm3 (0.0-0.1)   11/28/19  11:54    


 


Metamyelocytes #  0.7 K/mm3  11/28/19  11:54    


 


Myelocytes #  0.0 K/mm3  11/28/19  11:54    


 


Promyelocytes #  0.0 K/mm3  11/28/19  11:54    


 


Blast Cells #  0.0 K/mm3  11/28/19  11:54    


 


WBC Morphology  Not Reportable   11/28/19  11:54    


 


Hypersegmented Neuts  Not Reportable   11/28/19  11:54    


 


Hyposegmented Neuts  Not Reportable   11/28/19  11:54    


 


Hypogranular Neuts  Not Reportable   11/28/19  11:54    


 


Smudge Cells  Not Reportable   11/28/19  11:54    


 


Toxic Granulation  1+   11/28/19  11:54    


 


Toxic Vacuolation  Few   11/28/19  11:54    


 


Dohle Bodies  Not Reportable   11/28/19  11:54    


 


Pelger-Huet Anomaly  Not Reportable   11/28/19  11:54    


 


Jose Rods  Not Reportable   11/28/19  11:54    


 


Platelet Estimate  Consistent w auto   11/28/19  11:54    


 


Clumped Platelets  Not Reportable   11/28/19  11:54    


 


Plt Clumps, EDTA  Not Reportable   11/28/19  11:54    


 


Large Platelets  Not Reportable   11/28/19  11:54    


 


Giant Platelets  Not Reportable   11/28/19  11:54    


 


Platelet Satelliting  Not Reportable   11/28/19  11:54    


 


Plt Morphology Comment  Not Reportable   11/28/19  11:54    


 


RBC Morphology  Normal   11/28/19  11:54    


 


Dimorphic RBCs  Not Reportable   11/28/19  11:54    


 


Polychromasia  Not Reportable   11/28/19  11:54    


 


Hypochromasia  Not Reportable   11/28/19  11:54    


 


Poikilocytosis  Not Reportable   11/28/19  11:54    


 


Anisocytosis  Not Reportable   11/28/19  11:54    


 


Microcytosis  Not Reportable   11/28/19  11:54    


 


Macrocytosis  Not Reportable   11/28/19  11:54    


 


Spherocytes  Not Reportable   11/28/19  11:54    


 


Pappenheimer Bodies  Not Reportable   11/28/19  11:54    


 


Sickle Cells  Not Reportable   11/28/19  11:54    


 


Target Cells  Not Reportable   11/28/19  11:54    


 


Tear Drop Cells  Not Reportable   11/28/19  11:54    


 


Ovalocytes  Not Reportable   11/28/19  11:54    


 


Helmet Cells  Not Reportable   11/28/19  11:54    


 


[place] Bodies  Not Reportable   11/28/19  11:54    


 


Cabot Rings  Not Reportable   11/28/19  11:54    


 


Clare Cells  Not Reportable   11/28/19  11:54    


 


Bite Cells  Not Reportable   11/28/19  11:54    


 


Crenated Cell  Not Reportable   11/28/19  11:54    


 


Elliptocytes  Not Reportable   11/28/19  11:54    


 


Acanthocytes (Spur)  Not Reportable   11/28/19  11:54    


 


Rouleaux  Not Reportable   11/28/19  11:54    


 


Hemoglobin C Crystals  Not Reportable   11/28/19  11:54    


 


Schistocytes  Not Reportable   11/28/19  11:54    


 


Malaria parasites  Not Reportable   11/28/19  11:54    


 


Davide Bodies  Not Reportable   11/28/19  11:54    


 


Hem Pathologist Commnt  No   11/28/19  11:54    


 


D-Dimer  5088.17 ng/mlDDU (0-234)  H  11/28/19  11:47    


 


POC ABG pH  7.453  (7.35-7.45)  H  11/28/19  21:30    


 


POC ABG pCO2  30.0  (35-45)  L  11/28/19  21:30    


 


POC ABG pO2  < 50  ()  L  11/28/19  21:30    


 


POC ABG HCO3  21.0  (22-26 mml/L)   11/28/19  21:30    


 


POC ABG Total CO2  22  (23-27mmol/L)   11/28/19  21:30    


 


POC ABG O2 Sat  76   11/28/19  21:30    


 


POC ABG Base Excess  -3  ((-2) - (+3)mmol/L)   11/28/19  21:30    


 


VBG pH  6.993  (7.320-7.420)  L*  11/26/19  18:43    


 


FiO2  70 %  11/28/19  21:30    


 


Sodium  142 mmol/L (137-145)   12/04/19  03:36    


 


Potassium  3.5 mmol/L (3.6-5.0)  L  12/04/19  03:36    


 


Chloride  110.7 mmol/L ()  H  12/04/19  03:36    


 


Carbon Dioxide  21 mmol/L (22-30)  L  12/04/19  03:36    


 


Anion Gap  14 mmol/L  12/04/19  03:36    


 


BUN  3 mg/dL (9-20)  L  12/04/19  03:36    


 


Creatinine  0.8 mg/dL (0.8-1.5)   12/04/19  03:36    


 


Estimated GFR  > 60 ml/min  12/04/19  03:36    


 


BUN/Creatinine Ratio  4 %  12/04/19  03:36    


 


Glucose  147 mg/dL ()  H  12/04/19  03:36    


 


POC Glucose  122  ()  H  12/04/19  11:22    


 


Hemoglobin A1c  15.1 % (4-6)  H  11/28/19  11:54    


 


Lactic Acid  1.60 mmol/L (0.7-2.0)   11/28/19  22:53    


 


Calcium  7.7 mg/dL (8.4-10.2)  L  12/04/19  03:36    


 


Phosphorus  3.20 mg/dL (2.5-4.5)  D 11/30/19  06:25    


 


Magnesium  1.80 mg/dL (1.7-2.3)   12/04/19  03:36    


 


Total Bilirubin  0.20 mg/dL (0.1-1.2)   11/28/19  11:47    


 


Direct Bilirubin  < 0.2 mg/dL (0-0.2)   11/26/19  18:43    


 


Indirect Bilirubin  0.0 mg/dL  11/26/19  18:43    


 


AST  39 units/L (5-40)   11/28/19  11:47    


 


ALT  11 units/L (7-56)   11/28/19  11:47    


 


Alkaline Phosphatase  150 units/L ()  H  11/28/19  11:47    


 


Total Creatine Kinase  140 units/L ()   11/28/19  18:43    


 


CK-MB (CK-2)  3.5 ng/mL (0.0-4.0)   11/28/19  18:43    


 


CK-MB (CK-2) Rel Index  2.5  (0-4)   11/28/19  18:43    


 


Troponin T  < 0.010 ng/mL (0.00-0.029)   11/28/19  18:43    


 


NT-Pro-B Natriuret Pep  1292 pg/mL (0-450)  H  11/28/19  18:43    


 


Total Protein  5.6 g/dL (6.3-8.2)  L D 11/28/19  11:47    


 


Albumin  3.4 g/dL (3.9-5)  L  11/28/19  11:47    


 


Albumin/Globulin Ratio  1.5 %  11/28/19  11:47    


 


Lipase  64 units/L (13-60)  H  11/26/19  18:43    


 


Procalcitonin  16.55 ng/mL (<0.15)   11/28/19  18:43    


 


Urine Color  Straw  (Yellow)   11/26/19  21:29    


 


Urine Turbidity  Clear  (Clear)   11/26/19  21:29    


 


Urine pH  5.0  (5.0-7.0)   11/26/19  21:29    


 


Ur Specific Gravity  1.013  (1.003-1.030)   11/26/19  21:29    


 


Urine Protein  100 mg/dl mg/dL (Negative)   11/26/19  21:29    


 


Urine Glucose (UA)  >=500 mg/dL (Negative)   11/26/19  21:29    


 


Urine Ketones  80 mg/dL (Negative)   11/26/19  21:29    


 


Urine Blood  Sm  (Negative)   11/26/19  21:29    


 


Urine Nitrite  Neg  (Negative)   11/26/19  21:29    


 


Urine Bilirubin  Neg  (Negative)   11/26/19  21:29    


 


Urine Urobilinogen  < 2.0 mg/dL (<2.0)   11/26/19  21:29    


 


Ur Leukocyte Esterase  Neg  (Negative)   11/26/19  21:29    


 


Urine WBC (Auto)  1.0 /HPF (0.0-6.0)   11/26/19  21:29    


 


Urine RBC (Auto)  3.0 /HPF (0.0-6.0)   11/26/19  21:29    


 


U Epithel Cells (Auto)  1.0 /HPF (0-13.0)   11/26/19  21:29    


 


Urine Bacteria (Auto)  1+ /HPF (Negative)   11/26/19  21:29    


 


Urine Mucus  Few /HPF  11/26/19  21:29    


 


Urine Opiates Screen  Presumptive negative   11/29/19  18:20    


 


Urine Methadone Screen  Presumptive negative   11/29/19  18:20    


 


Ur Barbiturates Screen  Presumptive negative   11/29/19  18:20    


 


Ur Phencyclidine Scrn  Presumptive negative   11/29/19  18:20    


 


Ur Amphetamines Screen  Presumptive negative   11/29/19  18:20    


 


U Benzodiazepines Scrn  Presumptive negative   11/29/19  18:20    


 


Urine Cocaine Screen  Presumptive negative   11/29/19  18:20    


 


U Marijuana (THC) Screen  Presumptive negative   11/29/19  18:20    


 


Drugs of Abuse Note  Disclamer   11/29/19  18:20    














Active Medications





- Current Medications


Current Medications: 














Generic Name Dose Route Start Last Admin





  Trade Name Freq  PRN Reason Stop Dose Admin


 


Acetaminophen  650 mg  12/01/19 23:03  12/01/19 23:25





  Tylenol  PO   650 mg





  Q6H PRN   Administration





  Pain, Mild (1-3)  


 


Al Hydrox/Mg Hydrox/Simethicone  15 ml  11/27/19 19:03 





  Alum-Mag Hydrox-Simeth 419-184-43dv/5ml  PO  





  Q4H PRN  





  Indigestion  


 


Cholestyramine Resin  8 gm  12/01/19 10:00  12/04/19 09:04





  Questran  PO   8 gm





  BID SAI   Administration


 


Dextrose  0 ml  11/28/19 13:16  12/02/19 17:10





  D50w (25gm) Syringe  IV   20 ml





  Q30MIN PRN   Administration





  Hypoglycemia  





  Protocol  


 


Famotidine  20 mg  11/30/19 10:00  12/04/19 09:08





  Pepcid  PO   20 mg





  BID SAI   Administration


 


Potassium Chloride 10 meq/  1,005 mls @ 100 mls/hr  12/01/19 04:15  12/04/19 

06:06





  Sodium Chloride  IV   100 mls/hr





  AS DIRECT SAI   Administration


 


Insulin Glargine  10 units  12/04/19 22:00 





  Lantus  SUB-Q  





  QHS SAI  


 


Insulin Human Lispro  0 unit  11/29/19 11:30  12/04/19 07:30





  Humalog  SUB-Q   Not Given





  ACHS SAI  





  Protocol  


 


Insulin Human Lispro  7 unit  12/03/19 17:00  12/04/19 08:00





  Humalog  SUB-Q   7 unit





  TIDWM SAI   Administration


 


Loperamide HCl  2 mg  12/01/19 09:39  12/04/19 09:12





  Imodium A-D  PO   2 mg





  Q2H PRN   Administration





  Diarrhea  


 


Metoprolol Tartrate  12.5 mg  11/28/19 22:00  12/04/19 09:08





  Metoprolol  PO   12.5 mg





  BID SAI   Administration


 


Ondansetron HCl  4 mg  11/27/19 19:03  11/27/19 21:20





  Zofran  IV   4 mg





  Q4H PRN   Administration





  Nausea And Vomiting  


 


Potassium Chloride  40 meq  12/04/19 10:00  12/04/19 09:05





  K-Dur  PO  12/07/19 09:59  40 meq





  QDAY SAI   Administration


 


Sodium Chloride  10 ml  11/26/19 22:00  12/04/19 09:07





  Sodium Chloride Flush Syringe 10 Ml  IV   10 ml





  BID SAI   Administration


 


Sodium Chloride  10 ml  11/26/19 20:04 





  Sodium Chloride Flush Syringe 10 Ml  IV  





  PRN PRN  





  LINE FLUSH  


 


Zolpidem Tartrate  5 mg  11/27/19 19:05  12/03/19 21:56





  Ambien  PO   5 mg





  QHS PRN   Administration





  Sleep  














Nutrition/Malnutrition Assess





- Dietary Evaluation


Nutrition/Malnutrition Findings: 


                                        





Nutrition Notes                                            Start:  11/28/19 

11:49


Freq:                                                      Status: Active       




Protocol:                                                                       




 Document     12/04/19 09:57  CC  (Rec: 12/04/19 10:16  CC  [id number])


 Co-Sign      12/04/19 09:57  LP


 Nutrition Notes


     Initial or Follow up                        Reassessment


     Current Diagnosis                           Acute Kidney Injury,Diabetes,


                                                 Sepsis


     Other Pertinent Diagnosis                   DKA, gastritis


     Current Diet                                Consistent CHO/mech soft


     Labs/Tests                                  K 3.5


                                                 A1c 15.1


                                                 BUN 3


     Pertinent Medications                       Imodium


                                                 K-Dur


     Height                                      5 ft 4 in


     Weight                                      51.3 kg


     Usual Body Weight                           62.7 kg


     Ideal Body Weight (kg)                      59.09


     BMI                                         19.4


     Subjective/Other Information                Pt reports he ate 100% or


                                                 dinner and breakfast. Pt


                                                 reported he did not get a


                                                 snack at dinner and was still


                                                 hungry. Pt was given handout


                                                 on gastroparesis diet after


                                                 inquiring with dietitan about


                                                 recieving this.


     Percent of energy/protein needs met:        100%/100%


     Burn                                        Absent


     Trauma                                      Absent


     Current % PO                                Good (%)


     Minimum of two criteria                     Yes


     Interpretation of Weight Loss (severe)      > 20% in 1 year


     Body Fat Depletion                          Mild depletion (non-severe)


     #2


      Nutrition Diagnosis                        Food and nutrition-related


                                                 knowledge deficit


      Diagnosis Progress(for reassessment        Continues


       documentation)                            


     #1


      Nutrition Diagnosis                        Malnutrition


      Diagnosis Progress(for reassessment        Continues


       documentation)                            


     Is patient on ventilator?                   No


     Is Patient Ambulatory and/or Out of Bed     Yes


     REE-([place] Jeor-ambulatory/OOB) [     6817.200


      [email or web address]]                               


     Kcal/Kg value to use for calculation        41


     Approximate Energy Requirements Using       2103


      kcal/Kg                                    


     Calculation Used for Recommendations        [facility]


     Additional Notes                            Protein: 62-77g/day (1.2-1.5g/


                                                 kg)


                                                 Fluid:1 ml/kcal


 Nutrition Intervention


     Change Diet Order:                          Continue [facility] soft consistent


                                                 CHO diet


     Add Supplement/Snack (indicate name/kcal    double protein and non-starchy


      /protein )                                 vegetables


     Goal #1                                     Meet at least 80% of energy


                                                 and protein needs


     Anticipated Discharge Needs:                Consisntent CHO


     Follow-Up By:                               12/11/19


     Additional Comments                         F/U for intakes

## 2019-12-04 NOTE — PROGRESS NOTE
Assessment and Plan





Patient alert, awake. Patient reports improvement in shortness of breath. 

Patient is on 2L O2. O2 saturation running 97%. Patient reports cough has 

improved. No chest pain. History of depression and hypertension. Patient 

admitted for DKA. Patient has history of smoking  1/2 a pack a day for 10 years.

Complaining nasal congestion. No sore throat. 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 Denies alcohol or drug abuse. No 

known drug allergies.





- Patient Problems


(1) Sepsis


Current Visit: Yes   Status: Acute   


Qualifiers: 


   Sepsis acute organ dysfunction status: with acute organ dysfunction 


Plan to address problem: 


Patient treated with ceftrioxone, Zithromax and vancomycin.








(2) DKA (diabetic ketoacidoses)


Current Visit: Yes   Status: Acute   


Plan to address problem: 


Improved.  Anion gap 14.


Management as per primary care.








(3) ARIANNA (acute kidney injury)


Current Visit: Yes   Status: Acute   


Plan to address problem: 


Management as per nephrology.








(4) Pulmonary infiltrates on CXR


Current Visit: Yes   Status: Acute   


Plan to address problem: 


Patient treated with ceftrioxone, zithromax and vancomycin.


Patient afebrile, No leukocytosis.


Repeat chest x ray tomorrow.








Subjective


Date of service: 12/04/19


Principal diagnosis: Ac Hypoxemic Resp Failure; Jesus. Pulm infiltrates; DKA; 

Sepsis


Interval history: 





Patient alert, awake. Patient reports improvement in shortness of breath. 

Patient is on 2L O2. O2 saturation running 97%. Patient reports cough has 

improved. No chest pain. History of depression and hypertension. Patient 

admitted for DKA. Patient has history of smoking  1/2 a pack a day for 10 years.

Complaining nasal congestion. No sore throat. 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 Denies alcohol or drug abuse. No 

known drug allergies.





Objective


                               Vital Signs - 12hr











  12/04/19 12/04/19 12/04/19





  02:00 03:00 03:45


 


Temperature   


 


Pulse Rate 99 H 103 H 


 


Pulse Rate [   





From Monitor]   


 


Respiratory 16 21 





Rate   


 


Blood Pressure 110/66 121/71 


 


O2 Sat by Pulse 98 93 98





Oximetry   














  12/04/19 12/04/19 12/04/19





  04:00 05:00 06:00


 


Temperature 98.4 F  


 


Pulse Rate 98 H 98 H 96 H


 


Pulse Rate [ 100 H  





From Monitor]   


 


Respiratory 17 19 18





Rate   


 


Blood Pressure 112/76 106/70 106/72


 


O2 Sat by Pulse 97 94 96





Oximetry   














  12/04/19 12/04/19 12/04/19





  07:00 07:33 07:45


 


Temperature   98.4 F


 


Pulse Rate 86  


 


Pulse Rate [   





From Monitor]   


 


Respiratory 14  





Rate   


 


Blood Pressure 111/86  


 


O2 Sat by Pulse 100 100 





Oximetry   














  12/04/19 12/04/19 12/04/19





  08:00 09:00 09:08


 


Temperature   


 


Pulse Rate 95 H 99 H 102 H


 


Pulse Rate [ 90  





From Monitor]   


 


Respiratory 15  





Rate   


 


Blood Pressure 116/74 116/85 116/85


 


O2 Sat by Pulse 96 90 





Oximetry   














  12/04/19 12/04/19 12/04/19





  09:14 10:00 11:00


 


Temperature   


 


Pulse Rate  87 102 H


 


Pulse Rate [   





From Monitor]   


 


Respiratory   





Rate   


 


Blood Pressure  123/90 123/90


 


O2 Sat by Pulse 98 97 97





Oximetry   














  12/04/19





  12:00


 


Temperature 98.5 F


 


Pulse Rate 91 H


 


Pulse Rate [ 89





From Monitor] 


 


Respiratory 23





Rate 


 


Blood Pressure 105/74


 


O2 Sat by Pulse 95





Oximetry 











Constitutional: no acute distress, alert, other (Young male, normocephalic with 

mildly increased respiratory effort at rest)


Eyes: non-icteric


ENT: oropharynx moist, other (Mallampati 2)


Neck: supple, no JVD


Effort: mildly labored


Ascultation: Bilateral: diminished breath sounds, rales


Percussion: Bilateral: not dull


Cardiovascular: regular rate and rhythm, other (S1,S2, no murmurs)


Gastrointestinal: normoactive bowel sounds, soft, non-tender, non-distended


Integumentary: normal


Extremities: no cyanosis, no edema, pulses normal, no ischemia or petechiae


Neurologic: normal mental status, non-focal exam, pupils equal and round, CN II-

XII normal, motor strength normal and


Psychiatric: mood appropriate, anxious


CBC and BMP: 


                                 12/04/19 13:58





                                 12/04/19 03:36


ABG, PT/INR, D-dimer: 


ABG











POC ABG pH  7.453  (7.35-7.45)  H  11/28/19  21:30    


 


POC ABG pCO2  30.0  (35-45)  L  11/28/19  21:30    


 


POC ABG pO2  < 50  ()  L  11/28/19  21:30    


 


POC ABG HCO3  21.0  (22-26 mml/L)   11/28/19  21:30    


 


POC ABG Total CO2  22  (23-27mmol/L)   11/28/19  21:30    


 


POC ABG O2 Sat  76   11/28/19  21:30    





PT/INR, D-dimer











D-Dimer  5088.17 ng/mlDDU (0-234)  H  11/28/19  11:47    








Abnormal lab findings: 


                                  Abnormal Labs











  11/26/19 11/26/19 11/26/19





  18:43 18:43 18:43


 


WBC   


 


RBC   


 


Hgb   


 


Hct   


 


MCHC   


 


Plt Count   


 


Mono % (Auto)   


 


Eos % (Auto)   


 


Eos #   


 


Seg Neutrophils %   


 


Seg Neuts % (Manual)   


 


Lymphocytes % (Manual)   


 


Seg Neutrophils # Man   


 


Lymphocytes # (Manual)   


 


Monocytes # (Manual)   


 


D-Dimer   


 


POC ABG pH   


 


POC ABG pCO2   


 


POC ABG pO2   


 


VBG pH   


 


Sodium   134 L 


 


Potassium   


 


Chloride   92.4 L 


 


Carbon Dioxide   3 L* 


 


BUN   


 


Creatinine   1.6 H 


 


Glucose   581 H* 


 


POC Glucose   


 


Hemoglobin A1c   


 


Lactic Acid   


 


Calcium   


 


Phosphorus  5.50 H  


 


Magnesium   


 


Alkaline Phosphatase    166 H


 


NT-Pro-B Natriuret Pep   


 


Total Protein   


 


Albumin   


 


Lipase   














  11/26/19 11/26/19 11/26/19





  18:43 18:43 19:04


 


WBC   


 


RBC   


 


Hgb   


 


Hct   


 


MCHC   


 


Plt Count   


 


Mono % (Auto)   


 


Eos % (Auto)   


 


Eos #   


 


Seg Neutrophils %   


 


Seg Neuts % (Manual)   


 


Lymphocytes % (Manual)   


 


Seg Neutrophils # Man   


 


Lymphocytes # (Manual)   


 


Monocytes # (Manual)   


 


D-Dimer   


 


POC ABG pH   


 


POC ABG pCO2   


 


POC ABG pO2   


 


VBG pH  6.993 L*  


 


Sodium   


 


Potassium   


 


Chloride   


 


Carbon Dioxide   


 


BUN   


 


Creatinine   


 


Glucose   


 


POC Glucose    437 H


 


Hemoglobin A1c   


 


Lactic Acid   


 


Calcium   


 


Phosphorus   


 


Magnesium   


 


Alkaline Phosphatase   


 


NT-Pro-B Natriuret Pep   


 


Total Protein   


 


Albumin   


 


Lipase   64 H 














  11/26/19 11/26/19 11/26/19





  19:40 20:14 20:45


 


WBC   24.8 H 


 


RBC   


 


Hgb   


 


Hct   


 


MCHC   31 L 


 


Plt Count   


 


Mono % (Auto)   


 


Eos % (Auto)   


 


Eos #   


 


Seg Neutrophils %   


 


Seg Neuts % (Manual)   89.0 H 


 


Lymphocytes % (Manual)   7.0 L 


 


Seg Neutrophils # Man   22.1 H 


 


Lymphocytes # (Manual)   


 


Monocytes # (Manual)   1.0 H 


 


D-Dimer   


 


POC ABG pH   


 


POC ABG pCO2   


 


POC ABG pO2   


 


VBG pH   


 


Sodium  135 L  


 


Potassium   


 


Chloride  94.4 L  


 


Carbon Dioxide  2 L*  


 


BUN   


 


Creatinine  1.6 H  


 


Glucose  536 H*  


 


POC Glucose    411 H


 


Hemoglobin A1c   


 


Lactic Acid   


 


Calcium   


 


Phosphorus  5.10 H  


 


Magnesium   


 


Alkaline Phosphatase   


 


NT-Pro-B Natriuret Pep   


 


Total Protein   


 


Albumin   


 


Lipase   














  11/26/19 11/26/19 11/26/19





  21:25 21:25 21:59


 


WBC   


 


RBC   


 


Hgb   


 


Hct   


 


MCHC   


 


Plt Count   


 


Mono % (Auto)   


 


Eos % (Auto)   


 


Eos #   


 


Seg Neutrophils %   


 


Seg Neuts % (Manual)   


 


Lymphocytes % (Manual)   


 


Seg Neutrophils # Man   


 


Lymphocytes # (Manual)   


 


Monocytes # (Manual)   


 


D-Dimer   


 


POC ABG pH   


 


POC ABG pCO2   


 


POC ABG pO2   


 


VBG pH   


 


Sodium   


 


Potassium  3.5 L  


 


Chloride   


 


Carbon Dioxide  5 L*  


 


BUN   


 


Creatinine   


 


Glucose  370 H  


 


POC Glucose    289 H


 


Hemoglobin A1c   


 


Lactic Acid   2.80 H* 


 


Calcium  8.2 L  


 


Phosphorus   


 


Magnesium   


 


Alkaline Phosphatase   


 


NT-Pro-B Natriuret Pep   


 


Total Protein   


 


Albumin   


 


Lipase   














  11/26/19 11/27/19 11/27/19





  23:16 00:32 00:37


 


WBC   


 


RBC   


 


Hgb   


 


Hct   


 


MCHC   


 


Plt Count   


 


Mono % (Auto)   


 


Eos % (Auto)   


 


Eos #   


 


Seg Neutrophils %   


 


Seg Neuts % (Manual)   


 


Lymphocytes % (Manual)   


 


Seg Neutrophils # Man   


 


Lymphocytes # (Manual)   


 


Monocytes # (Manual)   


 


D-Dimer   


 


POC ABG pH   


 


POC ABG pCO2   


 


POC ABG pO2   


 


VBG pH   


 


Sodium    148 H


 


Potassium    3.0 L


 


Chloride    116.1 H


 


Carbon Dioxide    8 L*


 


BUN   


 


Creatinine   


 


Glucose    135 H


 


POC Glucose  192 H  127 H 


 


Hemoglobin A1c   


 


Lactic Acid   


 


Calcium    7.1 L


 


Phosphorus   


 


Magnesium   


 


Alkaline Phosphatase   


 


NT-Pro-B Natriuret Pep   


 


Total Protein   


 


Albumin   


 


Lipase   














  11/27/19 11/27/19 11/27/19





  01:17 02:17 03:27


 


WBC   


 


RBC   


 


Hgb   


 


Hct   


 


MCHC   


 


Plt Count   


 


Mono % (Auto)   


 


Eos % (Auto)   


 


Eos #   


 


Seg Neutrophils %   


 


Seg Neuts % (Manual)   


 


Lymphocytes % (Manual)   


 


Seg Neutrophils # Man   


 


Lymphocytes # (Manual)   


 


Monocytes # (Manual)   


 


D-Dimer   


 


POC ABG pH   


 


POC ABG pCO2   


 


POC ABG pO2   


 


VBG pH   


 


Sodium   


 


Potassium   


 


Chloride   


 


Carbon Dioxide   


 


BUN   


 


Creatinine   


 


Glucose   


 


POC Glucose  135 H  166 H  254 H


 


Hemoglobin A1c   


 


Lactic Acid   


 


Calcium   


 


Phosphorus   


 


Magnesium   


 


Alkaline Phosphatase   


 


NT-Pro-B Natriuret Pep   


 


Total Protein   


 


Albumin   


 


Lipase   














  11/27/19 11/27/19 11/27/19





  04:13 04:34 05:41


 


WBC   


 


RBC   


 


Hgb   


 


Hct   


 


MCHC   


 


Plt Count   


 


Mono % (Auto)   


 


Eos % (Auto)   


 


Eos #   


 


Seg Neutrophils %   


 


Seg Neuts % (Manual)   


 


Lymphocytes % (Manual)   


 


Seg Neutrophils # Man   


 


Lymphocytes # (Manual)   


 


Monocytes # (Manual)   


 


D-Dimer   


 


POC ABG pH   


 


POC ABG pCO2   


 


POC ABG pO2   


 


VBG pH   


 


Sodium  147 H  


 


Potassium  3.3 L  


 


Chloride  113.4 H  


 


Carbon Dioxide  7 L*  


 


BUN   


 


Creatinine   


 


Glucose  320 H  


 


POC Glucose   320 H  333 H


 


Hemoglobin A1c   


 


Lactic Acid   


 


Calcium  7.5 L  


 


Phosphorus   


 


Magnesium   


 


Alkaline Phosphatase   


 


NT-Pro-B Natriuret Pep   


 


Total Protein   


 


Albumin   


 


Lipase   














  11/27/19 11/27/19 11/27/19





  06:20 07:27 07:53


 


WBC   


 


RBC   


 


Hgb   


 


Hct   


 


MCHC   


 


Plt Count   


 


Mono % (Auto)   


 


Eos % (Auto)   


 


Eos #   


 


Seg Neutrophils %   


 


Seg Neuts % (Manual)   


 


Lymphocytes % (Manual)   


 


Seg Neutrophils # Man   


 


Lymphocytes # (Manual)   


 


Monocytes # (Manual)   


 


D-Dimer   


 


POC ABG pH   


 


POC ABG pCO2   


 


POC ABG pO2   


 


VBG pH   


 


Sodium   


 


Potassium   


 


Chloride   


 


Carbon Dioxide   


 


BUN   


 


Creatinine   


 


Glucose   


 


POC Glucose  363 H  343 H  319 H


 


Hemoglobin A1c   


 


Lactic Acid   


 


Calcium   


 


Phosphorus   


 


Magnesium   


 


Alkaline Phosphatase   


 


NT-Pro-B Natriuret Pep   


 


Total Protein   


 


Albumin   


 


Lipase   














  11/27/19 11/27/19 11/27/19





  08:46 09:45 10:03


 


WBC   


 


RBC   


 


Hgb   


 


Hct   


 


MCHC   


 


Plt Count   


 


Mono % (Auto)   


 


Eos % (Auto)   


 


Eos #   


 


Seg Neutrophils %   


 


Seg Neuts % (Manual)   


 


Lymphocytes % (Manual)   


 


Seg Neutrophils # Man   


 


Lymphocytes # (Manual)   


 


Monocytes # (Manual)   


 


D-Dimer   


 


POC ABG pH   


 


POC ABG pCO2   


 


POC ABG pO2   


 


VBG pH   


 


Sodium    148 H


 


Potassium    3.1 L


 


Chloride    114.1 H


 


Carbon Dioxide    4 L*


 


BUN   


 


Creatinine   


 


Glucose    364 H


 


POC Glucose  402 H  340 H 


 


Hemoglobin A1c   


 


Lactic Acid   


 


Calcium    8.3 L


 


Phosphorus   


 


Magnesium   


 


Alkaline Phosphatase   


 


NT-Pro-B Natriuret Pep   


 


Total Protein   


 


Albumin   


 


Lipase   














  11/27/19 11/27/19 11/27/19





  10:42 11:26 12:53


 


WBC   


 


RBC   


 


Hgb   


 


Hct   


 


MCHC   


 


Plt Count   


 


Mono % (Auto)   


 


Eos % (Auto)   


 


Eos #   


 


Seg Neutrophils %   


 


Seg Neuts % (Manual)   


 


Lymphocytes % (Manual)   


 


Seg Neutrophils # Man   


 


Lymphocytes # (Manual)   


 


Monocytes # (Manual)   


 


D-Dimer   


 


POC ABG pH   


 


POC ABG pCO2   


 


POC ABG pO2   


 


VBG pH   


 


Sodium   


 


Potassium   


 


Chloride   


 


Carbon Dioxide   


 


BUN   


 


Creatinine   


 


Glucose   


 


POC Glucose  321 H  241 H  175 H


 


Hemoglobin A1c   


 


Lactic Acid   


 


Calcium   


 


Phosphorus   


 


Magnesium   


 


Alkaline Phosphatase   


 


NT-Pro-B Natriuret Pep   


 


Total Protein   


 


Albumin   


 


Lipase   














  11/27/19 11/27/19 11/27/19





  13:36 13:47 17:36


 


WBC   


 


RBC   


 


Hgb   


 


Hct   


 


MCHC   


 


Plt Count   


 


Mono % (Auto)   


 


Eos % (Auto)   


 


Eos #   


 


Seg Neutrophils %   


 


Seg Neuts % (Manual)   


 


Lymphocytes % (Manual)   


 


Seg Neutrophils # Man   


 


Lymphocytes # (Manual)   


 


Monocytes # (Manual)   


 


D-Dimer   


 


POC ABG pH   


 


POC ABG pCO2   


 


POC ABG pO2   


 


VBG pH   


 


Sodium  151 H  


 


Potassium  3.1 L  


 


Chloride  126.5 H  


 


Carbon Dioxide  12 L D  


 


BUN   


 


Creatinine   


 


Glucose  131 H  


 


POC Glucose   120 H  107 H


 


Hemoglobin A1c   


 


Lactic Acid   


 


Calcium  8.2 L  


 


Phosphorus   


 


Magnesium   


 


Alkaline Phosphatase   


 


NT-Pro-B Natriuret Pep   


 


Total Protein   


 


Albumin   


 


Lipase   














  11/27/19 11/27/19 11/27/19





  18:59 20:32 22:05


 


WBC   


 


RBC   


 


Hgb   


 


Hct   


 


MCHC   


 


Plt Count   


 


Mono % (Auto)   


 


Eos % (Auto)   


 


Eos #   


 


Seg Neutrophils %   


 


Seg Neuts % (Manual)   


 


Lymphocytes % (Manual)   


 


Seg Neutrophils # Man   


 


Lymphocytes # (Manual)   


 


Monocytes # (Manual)   


 


D-Dimer   


 


POC ABG pH   


 


POC ABG pCO2   


 


POC ABG pO2   


 


VBG pH   


 


Sodium   146 H 


 


Potassium   


 


Chloride   116.4 H 


 


Carbon Dioxide   10 L 


 


BUN   


 


Creatinine   


 


Glucose   267 H 


 


POC Glucose  153 H   336 H


 


Hemoglobin A1c   


 


Lactic Acid   


 


Calcium   


 


Phosphorus   


 


Magnesium   


 


Alkaline Phosphatase   


 


NT-Pro-B Natriuret Pep   


 


Total Protein   


 


Albumin   


 


Lipase   














  11/28/19 11/28/19 11/28/19





  05:00 07:47 11:21


 


WBC   


 


RBC   


 


Hgb   


 


Hct   


 


MCHC   


 


Plt Count   


 


Mono % (Auto)   


 


Eos % (Auto)   


 


Eos #   


 


Seg Neutrophils %   


 


Seg Neuts % (Manual)   


 


Lymphocytes % (Manual)   


 


Seg Neutrophils # Man   


 


Lymphocytes # (Manual)   


 


Monocytes # (Manual)   


 


D-Dimer   


 


POC ABG pH   


 


POC ABG pCO2   


 


POC ABG pO2   


 


VBG pH   


 


Sodium   


 


Potassium  3.3 L  


 


Chloride  111.1 H  


 


Carbon Dioxide  7 L*  


 


BUN  7 L  


 


Creatinine   


 


Glucose  290 H  


 


POC Glucose   328 H  390 H


 


Hemoglobin A1c   


 


Lactic Acid   


 


Calcium   


 


Phosphorus  1.30 L D  


 


Magnesium   


 


Alkaline Phosphatase   


 


NT-Pro-B Natriuret Pep   


 


Total Protein   


 


Albumin   


 


Lipase   














  11/28/19 11/28/19 11/28/19





  11:47 11:47 11:54


 


WBC   


 


RBC   


 


Hgb   


 


Hct   


 


MCHC   


 


Plt Count   


 


Mono % (Auto)   


 


Eos % (Auto)   


 


Eos #   


 


Seg Neutrophils %   


 


Seg Neuts % (Manual)   


 


Lymphocytes % (Manual)   


 


Seg Neutrophils # Man   


 


Lymphocytes # (Manual)   


 


Monocytes # (Manual)   


 


D-Dimer   5088.17 H 


 


POC ABG pH   


 


POC ABG pCO2   


 


POC ABG pO2   


 


VBG pH   


 


Sodium   


 


Potassium   


 


Chloride   


 


Carbon Dioxide  5 L*  


 


BUN  7 L  


 


Creatinine   


 


Glucose   


 


POC Glucose   


 


Hemoglobin A1c    15.1 H


 


Lactic Acid   


 


Calcium   


 


Phosphorus   


 


Magnesium   


 


Alkaline Phosphatase  150 H  


 


NT-Pro-B Natriuret Pep   


 


Total Protein  5.6 L D  


 


Albumin  3.4 L  


 


Lipase   














  11/28/19 11/28/19 11/28/19





  11:54 13:04 14:00


 


WBC  17.7 H  


 


RBC   


 


Hgb   


 


Hct   


 


MCHC   


 


Plt Count   


 


Mono % (Auto)   


 


Eos % (Auto)   


 


Eos #   


 


Seg Neutrophils %   


 


Seg Neuts % (Manual)   


 


Lymphocytes % (Manual)  4.0 L  


 


Seg Neutrophils # Man  12.2 H  


 


Lymphocytes # (Manual)  0.7 L  


 


Monocytes # (Manual)   


 


D-Dimer   


 


POC ABG pH   7.076 L 


 


POC ABG pCO2   27.9 L 


 


POC ABG pO2   71 L 


 


VBG pH   


 


Sodium   


 


Potassium   


 


Chloride   


 


Carbon Dioxide   


 


BUN   


 


Creatinine   


 


Glucose   


 


POC Glucose    371 H


 


Hemoglobin A1c   


 


Lactic Acid   


 


Calcium   


 


Phosphorus   


 


Magnesium   


 


Alkaline Phosphatase   


 


NT-Pro-B Natriuret Pep   


 


Total Protein   


 


Albumin   


 


Lipase   














  11/28/19 11/28/19 11/28/19





  15:13 16:09 17:13


 


WBC   


 


RBC   


 


Hgb   


 


Hct   


 


MCHC   


 


Plt Count   


 


Mono % (Auto)   


 


Eos % (Auto)   


 


Eos #   


 


Seg Neutrophils %   


 


Seg Neuts % (Manual)   


 


Lymphocytes % (Manual)   


 


Seg Neutrophils # Man   


 


Lymphocytes # (Manual)   


 


Monocytes # (Manual)   


 


D-Dimer   


 


POC ABG pH   


 


POC ABG pCO2   


 


POC ABG pO2   


 


VBG pH   


 


Sodium   


 


Potassium   


 


Chloride   


 


Carbon Dioxide   


 


BUN   


 


Creatinine   


 


Glucose   


 


POC Glucose  259 H  186 H  138 H


 


Hemoglobin A1c   


 


Lactic Acid   


 


Calcium   


 


Phosphorus   


 


Magnesium   


 


Alkaline Phosphatase   


 


NT-Pro-B Natriuret Pep   


 


Total Protein   


 


Albumin   


 


Lipase   














  11/28/19 11/28/19 11/28/19





  18:12 18:43 18:43


 


WBC   


 


RBC   


 


Hgb   


 


Hct   


 


MCHC   


 


Plt Count   


 


Mono % (Auto)   


 


Eos % (Auto)   


 


Eos #   


 


Seg Neutrophils %   


 


Seg Neuts % (Manual)   


 


Lymphocytes % (Manual)   


 


Seg Neutrophils # Man   


 


Lymphocytes # (Manual)   


 


Monocytes # (Manual)   


 


D-Dimer   


 


POC ABG pH   


 


POC ABG pCO2   


 


POC ABG pO2   


 


VBG pH   


 


Sodium   


 


Potassium    3.0 L


 


Chloride   


 


Carbon Dioxide    15 L D


 


BUN    7 L


 


Creatinine   


 


Glucose    132 H


 


POC Glucose  121 H  


 


Hemoglobin A1c   


 


Lactic Acid   


 


Calcium    8.3 L


 


Phosphorus   1.70 L D 


 


Magnesium   1.50 L 


 


Alkaline Phosphatase   


 


NT-Pro-B Natriuret Pep   


 


Total Protein   


 


Albumin   


 


Lipase   














  11/28/19 11/28/19 11/28/19





  18:43 18:43 19:20


 


WBC   


 


RBC   


 


Hgb   


 


Hct   


 


MCHC   


 


Plt Count   


 


Mono % (Auto)   


 


Eos % (Auto)   


 


Eos #   


 


Seg Neutrophils %   


 


Seg Neuts % (Manual)   


 


Lymphocytes % (Manual)   


 


Seg Neutrophils # Man   


 


Lymphocytes # (Manual)   


 


Monocytes # (Manual)   


 


D-Dimer   


 


POC ABG pH   


 


POC ABG pCO2   


 


POC ABG pO2   


 


VBG pH   


 


Sodium   


 


Potassium   


 


Chloride   


 


Carbon Dioxide   


 


BUN   


 


Creatinine   


 


Glucose   


 


POC Glucose    120 H


 


Hemoglobin A1c   


 


Lactic Acid  4.00 H*  


 


Calcium   


 


Phosphorus   


 


Magnesium   


 


Alkaline Phosphatase   


 


NT-Pro-B Natriuret Pep   1292 H 


 


Total Protein   


 


Albumin   


 


Lipase   














  11/28/19 11/28/19 11/28/19





  20:55 21:30 21:33


 


WBC   


 


RBC   


 


Hgb   


 


Hct   


 


MCHC   


 


Plt Count   


 


Mono % (Auto)   


 


Eos % (Auto)   


 


Eos #   


 


Seg Neutrophils %   


 


Seg Neuts % (Manual)   


 


Lymphocytes % (Manual)   


 


Seg Neutrophils # Man   


 


Lymphocytes # (Manual)   


 


Monocytes # (Manual)   


 


D-Dimer   


 


POC ABG pH   7.453 H 


 


POC ABG pCO2  30.5 L  30.0 L 


 


POC ABG pO2   < 50 L 


 


VBG pH   


 


Sodium   


 


Potassium   


 


Chloride   


 


Carbon Dioxide   


 


BUN   


 


Creatinine   


 


Glucose   


 


POC Glucose    121 H


 


Hemoglobin A1c   


 


Lactic Acid   


 


Calcium   


 


Phosphorus   


 


Magnesium   


 


Alkaline Phosphatase   


 


NT-Pro-B Natriuret Pep   


 


Total Protein   


 


Albumin   


 


Lipase   














  11/28/19 11/28/19 11/28/19





  21:59 22:40 22:53


 


WBC   


 


RBC   


 


Hgb   


 


Hct   


 


MCHC   


 


Plt Count   


 


Mono % (Auto)   


 


Eos % (Auto)   


 


Eos #   


 


Seg Neutrophils %   


 


Seg Neuts % (Manual)   


 


Lymphocytes % (Manual)   


 


Seg Neutrophils # Man   


 


Lymphocytes # (Manual)   


 


Monocytes # (Manual)   


 


D-Dimer   


 


POC ABG pH   


 


POC ABG pCO2   


 


POC ABG pO2   


 


VBG pH   


 


Sodium   


 


Potassium  3.0 L   2.6 L*


 


Chloride  107.9 H  


 


Carbon Dioxide  19 L   21 L


 


BUN  7 L   7 L


 


Creatinine   


 


Glucose  138 H   148 H


 


POC Glucose   137 H 


 


Hemoglobin A1c   


 


Lactic Acid   


 


Calcium  7.9 L   7.9 L


 


Phosphorus   


 


Magnesium   


 


Alkaline Phosphatase   


 


NT-Pro-B Natriuret Pep   


 


Total Protein   


 


Albumin   


 


Lipase   














  11/28/19 11/29/19 11/29/19





  23:41 02:54 03:51


 


WBC   


 


RBC   


 


Hgb   


 


Hct   


 


MCHC   


 


Plt Count   


 


Mono % (Auto)   


 


Eos % (Auto)   


 


Eos #   


 


Seg Neutrophils %   


 


Seg Neuts % (Manual)   


 


Lymphocytes % (Manual)   


 


Seg Neutrophils # Man   


 


Lymphocytes # (Manual)   


 


Monocytes # (Manual)   


 


D-Dimer   


 


POC ABG pH   


 


POC ABG pCO2   


 


POC ABG pO2   


 


VBG pH   


 


Sodium   


 


Potassium   


 


Chloride   


 


Carbon Dioxide   


 


BUN   


 


Creatinine   


 


Glucose   


 


POC Glucose  115 H  143 H  126 H


 


Hemoglobin A1c   


 


Lactic Acid   


 


Calcium   


 


Phosphorus   


 


Magnesium   


 


Alkaline Phosphatase   


 


NT-Pro-B Natriuret Pep   


 


Total Protein   


 


Albumin   


 


Lipase   














  11/29/19 11/29/19 11/29/19





  04:42 05:06 05:44


 


WBC   


 


RBC   


 


Hgb   


 


Hct   


 


MCHC   


 


Plt Count   


 


Mono % (Auto)   


 


Eos % (Auto)   


 


Eos #   


 


Seg Neutrophils %   


 


Seg Neuts % (Manual)   


 


Lymphocytes % (Manual)   


 


Seg Neutrophils # Man   


 


Lymphocytes # (Manual)   


 


Monocytes # (Manual)   


 


D-Dimer   


 


POC ABG pH   


 


POC ABG pCO2   


 


POC ABG pO2   


 


VBG pH   


 


Sodium   


 


Potassium   3.0 L 


 


Chloride   108.2 H 


 


Carbon Dioxide   19 L 


 


BUN   7 L 


 


Creatinine   


 


Glucose   173 H 


 


POC Glucose  127 H   163 H


 


Hemoglobin A1c   


 


Lactic Acid   


 


Calcium   8.1 L 


 


Phosphorus   1.40 L 


 


Magnesium   1.50 L 


 


Alkaline Phosphatase   


 


NT-Pro-B Natriuret Pep   


 


Total Protein   


 


Albumin   


 


Lipase   














  11/29/19 11/29/19 11/29/19





  06:41 08:36 09:31


 


WBC   


 


RBC   


 


Hgb   


 


Hct   


 


MCHC   


 


Plt Count   


 


Mono % (Auto)   


 


Eos % (Auto)   


 


Eos #   


 


Seg Neutrophils %   


 


Seg Neuts % (Manual)   


 


Lymphocytes % (Manual)   


 


Seg Neutrophils # Man   


 


Lymphocytes # (Manual)   


 


Monocytes # (Manual)   


 


D-Dimer   


 


POC ABG pH   


 


POC ABG pCO2   


 


POC ABG pO2   


 


VBG pH   


 


Sodium   


 


Potassium   


 


Chloride   


 


Carbon Dioxide   


 


BUN   


 


Creatinine   


 


Glucose   


 


POC Glucose  159 H  132 H  148 H


 


Hemoglobin A1c   


 


Lactic Acid   


 


Calcium   


 


Phosphorus   


 


Magnesium   


 


Alkaline Phosphatase   


 


NT-Pro-B Natriuret Pep   


 


Total Protein   


 


Albumin   


 


Lipase   














  11/29/19 11/29/19 11/29/19





  10:42 11:53 13:28


 


WBC   


 


RBC   


 


Hgb   


 


Hct   


 


MCHC   


 


Plt Count   


 


Mono % (Auto)   


 


Eos % (Auto)   


 


Eos #   


 


Seg Neutrophils %   


 


Seg Neuts % (Manual)   


 


Lymphocytes % (Manual)   


 


Seg Neutrophils # Man   


 


Lymphocytes # (Manual)   


 


Monocytes # (Manual)   


 


D-Dimer   


 


POC ABG pH   


 


POC ABG pCO2   


 


POC ABG pO2   


 


VBG pH   


 


Sodium   


 


Potassium    3.2 L


 


Chloride   


 


Carbon Dioxide    14 L


 


BUN    7 L


 


Creatinine   


 


Glucose    256 H


 


POC Glucose  191 H  181 H 


 


Hemoglobin A1c   


 


Lactic Acid   


 


Calcium    8.0 L


 


Phosphorus   


 


Magnesium   


 


Alkaline Phosphatase   


 


NT-Pro-B Natriuret Pep   


 


Total Protein   


 


Albumin   


 


Lipase   














  11/29/19 11/29/19 11/30/19





  16:14 22:24 06:25


 


WBC   


 


RBC   


 


Hgb   


 


Hct   


 


MCHC   


 


Plt Count   


 


Mono % (Auto)   


 


Eos % (Auto)   


 


Eos #   


 


Seg Neutrophils %   


 


Seg Neuts % (Manual)   


 


Lymphocytes % (Manual)   


 


Seg Neutrophils # Man   


 


Lymphocytes # (Manual)   


 


Monocytes # (Manual)   


 


D-Dimer   


 


POC ABG pH   


 


POC ABG pCO2   


 


POC ABG pO2   


 


VBG pH   


 


Sodium    148 H


 


Potassium    3.2 L


 


Chloride    116.3 H


 


Carbon Dioxide    17 L


 


BUN    7 L


 


Creatinine   


 


Glucose    192 H


 


POC Glucose  334 H  106 H 


 


Hemoglobin A1c   


 


Lactic Acid   


 


Calcium    8.3 L


 


Phosphorus   


 


Magnesium   


 


Alkaline Phosphatase   


 


NT-Pro-B Natriuret Pep   


 


Total Protein   


 


Albumin   


 


Lipase   














  11/30/19 11/30/19 11/30/19





  07:38 12:48 16:24


 


WBC   


 


RBC   


 


Hgb   


 


Hct   


 


MCHC   


 


Plt Count   


 


Mono % (Auto)   


 


Eos % (Auto)   


 


Eos #   


 


Seg Neutrophils %   


 


Seg Neuts % (Manual)   


 


Lymphocytes % (Manual)   


 


Seg Neutrophils # Man   


 


Lymphocytes # (Manual)   


 


Monocytes # (Manual)   


 


D-Dimer   


 


POC ABG pH   


 


POC ABG pCO2   


 


POC ABG pO2   


 


VBG pH   


 


Sodium   


 


Potassium   


 


Chloride   


 


Carbon Dioxide   


 


BUN   


 


Creatinine   


 


Glucose   


 


POC Glucose  200 H  156 H  223 H


 


Hemoglobin A1c   


 


Lactic Acid   


 


Calcium   


 


Phosphorus   


 


Magnesium   


 


Alkaline Phosphatase   


 


NT-Pro-B Natriuret Pep   


 


Total Protein   


 


Albumin   


 


Lipase   














  11/30/19 12/01/19 12/01/19





  21:27 06:00 09:30


 


WBC   


 


RBC   


 


Hgb    11.2 L


 


Hct    33.7 L D


 


MCHC   


 


Plt Count    58 L


 


[place] % (Auto)   


 


Eos % (Auto)    4.7 H


 


Eos #   


 


Seg Neutrophils %    75.7 H


 


Seg Neuts % (Manual)   


 


Lymphocytes % (Manual)   


 


Seg Neutrophils # Man   


 


Lymphocytes # (Manual)   


 


Monocytes # (Manual)   


 


D-Dimer   


 


POC ABG pH   


 


POC ABG pCO2   


 


POC ABG pO2   


 


VBG pH   


 


Sodium   


 


Potassium   2.5 L* D 


 


Chloride   


 


Carbon Dioxide   19 L 


 


BUN   5 L 


 


Creatinine   


 


Glucose   42 L 


 


POC Glucose  219 H  


 


Hemoglobin A1c   


 


Lactic Acid   


 


Calcium   8.1 L 


 


Phosphorus   


 


Magnesium   


 


Alkaline Phosphatase   


 


NT-Pro-B Natriuret Pep   


 


Total Protein   


 


Albumin   


 


Lipase   














  12/01/19 12/01/19 12/02/19





  11:57 22:08 01:15


 


WBC   


 


RBC   


 


Hgb   


 


Hct   


 


MCHC   


 


Plt Count   


 


Mono % (Auto)   


 


Eos % (Auto)   


 


Eos #   


 


Seg Neutrophils %   


 


Seg Neuts % (Manual)   


 


Lymphocytes % (Manual)   


 


Seg Neutrophils # Man   


 


Lymphocytes # (Manual)   


 


Monocytes # (Manual)   


 


D-Dimer   


 


POC ABG pH   


 


POC ABG pCO2   


 


POC ABG pO2   


 


VBG pH   


 


Sodium   


 


Potassium   


 


Chloride   


 


Carbon Dioxide   


 


BUN   


 


Creatinine   


 


Glucose   


 


POC Glucose  237 H  55 L  < 40 L


 


Hemoglobin A1c   


 


Lactic Acid   


 


Calcium   


 


Phosphorus   


 


Magnesium   


 


Alkaline Phosphatase   


 


NT-Pro-B Natriuret Pep   


 


Total Protein   


 


Albumin   


 


Lipase   














  12/02/19 12/02/19 12/02/19





  02:05 07:49 11:46


 


WBC   


 


RBC   


 


Hgb   


 


Hct   


 


MCHC   


 


Plt Count   


 


Mono % (Auto)   


 


Eos % (Auto)   


 


Eos #   


 


Seg Neutrophils %   


 


Seg Neuts % (Manual)   


 


Lymphocytes % (Manual)   


 


Seg Neutrophils # Man   


 


Lymphocytes # (Manual)   


 


Monocytes # (Manual)   


 


D-Dimer   


 


POC ABG pH   


 


POC ABG pCO2   


 


POC ABG pO2   


 


VBG pH   


 


Sodium   


 


Potassium   2.9 L* 


 


Chloride   


 


Carbon Dioxide   20 L 


 


BUN   3 L 


 


Creatinine   


 


Glucose   188 H 


 


POC Glucose  159 H   182 H


 


Hemoglobin A1c   


 


Lactic Acid   


 


Calcium   8.1 L 


 


Phosphorus   


 


Magnesium   


 


Alkaline Phosphatase   


 


NT-Pro-B Natriuret Pep   


 


Total Protein   


 


Albumin   


 


Lipase   














  12/02/19 12/02/19 12/02/19





  17:06 18:08 21:55


 


WBC   


 


RBC   


 


Hgb   


 


Hct   


 


MCHC   


 


Plt Count   


 


Mono % (Auto)   


 


Eos % (Auto)   


 


Eos #   


 


Seg Neutrophils %   


 


Seg Neuts % (Manual)   


 


Lymphocytes % (Manual)   


 


Seg Neutrophils # Man   


 


Lymphocytes # (Manual)   


 


Monocytes # (Manual)   


 


D-Dimer   


 


POC ABG pH   


 


POC ABG pCO2   


 


POC ABG pO2   


 


VBG pH   


 


Sodium   


 


Potassium   


 


Chloride   


 


Carbon Dioxide   


 


BUN   


 


Creatinine   


 


Glucose   


 


POC Glucose  < 40 L  111 H  255 H


 


Hemoglobin A1c   


 


Lactic Acid   


 


Calcium   


 


Phosphorus   


 


Magnesium   


 


Alkaline Phosphatase   


 


NT-Pro-B Natriuret Pep   


 


Total Protein   


 


Albumin   


 


Lipase   














  12/02/19 12/03/19 12/03/19





  Unknown 04:50 04:50


 


WBC   


 


RBC   3.26 L 


 


Hgb  11.1 L  9.3 L 


 


Hct  32.7 L  27.6 L 


 


MCHC   


 


Plt Count  38 L  30 L 


 


Mono % (Auto)  7.4 H  


 


Eos % (Auto)  9.0 H  


 


Eos #  0.6 H  


 


Seg Neutrophils %   


 


Seg Neuts % (Manual)   


 


Lymphocytes % (Manual)   


 


Seg Neutrophils # Man   


 


Lymphocytes # (Manual)   


 


Monocytes # (Manual)   


 


D-Dimer   


 


POC ABG pH   


 


POC ABG pCO2   


 


POC ABG pO2   


 


VBG pH   


 


Sodium   


 


Potassium    3.3 L D


 


Chloride    112.5 H


 


Carbon Dioxide   


 


BUN    4 L


 


Creatinine   


 


Glucose    275 H


 


POC Glucose   


 


Hemoglobin A1c   


 


Lactic Acid   


 


Calcium    7.4 L


 


Phosphorus   


 


Magnesium   


 


Alkaline Phosphatase   


 


NT-Pro-B Natriuret Pep   


 


Total Protein   


 


Albumin   


 


Lipase   














  12/03/19 12/03/19 12/03/19





  07:36 11:29 16:12


 


WBC   


 


RBC   


 


Hgb   


 


Hct   


 


MCHC   


 


Plt Count   


 


Mono % (Auto)   


 


Eos % (Auto)   


 


Eos #   


 


Seg Neutrophils %   


 


Seg Neuts % (Manual)   


 


Lymphocytes % (Manual)   


 


Seg Neutrophils # Man   


 


Lymphocytes # (Manual)   


 


Monocytes # (Manual)   


 


D-Dimer   


 


POC ABG pH   


 


POC ABG pCO2   


 


POC ABG pO2   


 


VBG pH   


 


Sodium   


 


Potassium   


 


Chloride   


 


Carbon Dioxide   


 


BUN   


 


Creatinine   


 


Glucose   


 


POC Glucose  294 H  460 H  < 40 L


 


Hemoglobin A1c   


 


Lactic Acid   


 


Calcium   


 


Phosphorus   


 


Magnesium   


 


Alkaline Phosphatase   


 


NT-Pro-B Natriuret Pep   


 


Total Protein   


 


Albumin   


 


Lipase   














  12/03/19 12/04/19 12/04/19





  21:14 03:36 11:22


 


WBC   


 


RBC   


 


Hgb   


 


Hct   


 


MCHC   


 


Plt Count   


 


Mono % (Auto)   


 


Eos % (Auto)   


 


Eos #   


 


Seg Neutrophils %   


 


Seg Neuts % (Manual)   


 


Lymphocytes % (Manual)   


 


Seg Neutrophils # Man   


 


Lymphocytes # (Manual)   


 


Monocytes # (Manual)   


 


D-Dimer   


 


POC ABG pH   


 


POC ABG pCO2   


 


POC ABG pO2   


 


VBG pH   


 


Sodium   


 


Potassium   3.5 L 


 


Chloride   110.7 H 


 


Carbon Dioxide   21 L 


 


BUN   3 L 


 


Creatinine   


 


Glucose   147 H 


 


POC Glucose  240 H   122 H


 


Hemoglobin A1c   


 


Lactic Acid   


 


Calcium   7.7 L 


 


Phosphorus   


 


Magnesium   


 


Alkaline Phosphatase   


 


NT-Pro-B Natriuret Pep   


 


Total Protein   


 


Albumin   


 


Lipase   











Allied health notes reviewed: nursing

## 2019-12-05 VITALS — DIASTOLIC BLOOD PRESSURE: 93 MMHG | SYSTOLIC BLOOD PRESSURE: 130 MMHG

## 2019-12-05 LAB
BUN SERPL-MCNC: 3 MG/DL (ref 9–20)
BUN/CREAT SERPL: 4 %
CALCIUM SERPL-MCNC: 7.6 MG/DL (ref 8.4–10.2)
HEMOLYSIS INDEX: 1

## 2019-12-05 RX ADMIN — INSULIN LISPRO SCH UNIT: 100 INJECTION, SOLUTION INTRAVENOUS; SUBCUTANEOUS at 09:07

## 2019-12-05 RX ADMIN — METOPROLOL TARTRATE SCH MG: 25 TABLET, FILM COATED ORAL at 09:09

## 2019-12-05 RX ADMIN — INSULIN LISPRO SCH UNIT: 100 INJECTION, SOLUTION INTRAVENOUS; SUBCUTANEOUS at 12:30

## 2019-12-05 RX ADMIN — INSULIN LISPRO SCH: 100 INJECTION, SOLUTION INTRAVENOUS; SUBCUTANEOUS at 09:03

## 2019-12-05 RX ADMIN — CHOLESTYRAMINE SCH GM: 4 POWDER, FOR SUSPENSION ORAL at 09:28

## 2019-12-05 RX ADMIN — INSULIN LISPRO SCH UNIT: 100 INJECTION, SOLUTION INTRAVENOUS; SUBCUTANEOUS at 12:35

## 2019-12-05 RX ADMIN — FAMOTIDINE SCH MG: 20 TABLET ORAL at 09:08

## 2019-12-05 RX ADMIN — Medication SCH ML: at 09:11

## 2019-12-05 NOTE — DISCHARGE SUMMARY
Providers





- Providers


Date of Admission: 


11/26/19 20:05





Date of discharge: 12/05/19


Attending physician: 


JOSE G LANE





                                        





11/27/19 09:30


Consult to Physician [CONS] Routine 


   Comment: 


   Consulting Provider: KARLA YOST


   Physician Instructions: 


   Reason For Exam: Critical care





11/28/19 13:16


Consult to Case Management [CONS] Routine 


   Services Needed at Discharge: Home Health Services





11/28/19 14:10


Consult to PICC Line RN [CONS] Stat 


   Reason For Exam: poor iv access. multiple drugs given


   Type Line:: Midline





11/29/19 10:25


Consult to PICC Line RN [CONS] Routine 


   Reason For Exam: PICC placement


   Type Line:: PICC











Primary care physician: 


PRIMARY CARE MD








Hospitalization


Reason for admission: Acute Hypoxemic Respiratory Failure, DKA


Condition: Stable


Pertinent studies: 





Echo showed EF of 60-65% with diastolic dysfunction


Hospital course: 





Final discharge diagnosis:


Acute respiratory failure with hypoxia, probably due to undiagnosed severe COPD 

with acute exacerbation


DKA (diabetic ketoacidosis)


Acute thrombocytopenia


Sinus tachycardia


Severe metabolic acidosis


Hypernatremia 2/2 dehydration


Hypophosphatemia


Hypomagnesemia


ARIANNA (acute kidney injury) probably vasomotor nephropathy due to dehydration


Presumed Severe sepsis with organ failure


Chronic Diastolic Heart failure with chronic interstitial edema


Severe Protein calorie Malnutrition


Tobacco abuse








Hospital course:


Patient was admitted to the ICU and placed on DKA protocol as well as oxygen 

supplementation for the hypoxia. He also received supplements for electrolyte 

abnormalities in addition to IV antibiotics for suspected sepsis.  Subsequently,

 his anion gap closed and he was transitioned to subcutaneous insulin.  At some 

point, his platelet level trended down while he was on anticoagulation.  

However, HIT test done came back neg and his platelet level subsequently 

improved. Thereafter, he was deemed stable for discharge with clinic follow-up. 

 At discharge, he was counseled on tobacco use cessation.  He was also evaluated

 for home oxygen which he did not qualify for.


Disposition: DC-01 TO HOME OR SELFCARE


Time spent for discharge: 40 mins





Core Measure Documentation





- Palliative Care


Palliative Care/ Comfort Measures: Not Applicable





- Core Measures


Any of the following diagnoses?: none





Exam





- Constitutional


Vitals: 


                                        











Temp Pulse Resp BP Pulse Ox


 


 98.3 F   100 H  18   130/93   98 


 


 12/05/19 08:56  12/05/19 11:04  12/05/19 11:04  12/05/19 09:09  12/05/19 11:04











General appearance: Present: no acute distress





- EENT


Eyes: Present: PERRL, EOM intact


ENT: hearing intact, clear oral mucosa





- Neck


Neck: Present: supple, normal ROM





- Respiratory


Respiratory effort: normal


Respiratory: bilateral: CTA





- Cardiovascular


Rhythm: regular


Heart Sounds: Present: S1 & S2.  Absent: rub, click





- Extremities


Extremities: No edema





- Abdominal


General gastrointestinal: Present: soft, non-tender, non-distended, normal bowel

 sounds





- Integumentary


Integumentary: Present: clear, warm, dry





- Musculoskeletal


Musculoskeletal: gait normal, strength equal bilaterally





- Psychiatric


Psychiatric: appropriate mood/affect, intact judgment & insight





- Neurologic


Neurologic: CNII-XII intact, moves all extremities





Plan


Follow up with: 


PRIMARY CARE,MD [Primary Care Provider] - 7 Days


Prescriptions: 


Metoprolol [Lopressor TAB] 12.5 mg PO BID #30 tablet


Insulin NPH Human Isophane [Novolin N] 20 units SQ QAM #1 vial


Insulin NPH Human Isophane [Novolin N] 15 units SQ QPM #1 vial


Insulin Regular, Human [Novolin R] 100 unit IJ ACHS #1 vial

## 2019-12-05 NOTE — XRAY REPORT
CHEST 2 VIEWS 



INDICATION: 

follow up on bilateral pulmonary infiltrates.



COMPARISON: 

12/1/2019



FINDINGS:

Support devices: Right PICC line tip is in the distal SVC and is unchanged.



Heart: Within normal limits. 

Pulmonary vasculature: Normal.

Lungs/pleura: Near complete clearing of the lungs since the last exam. However, new mild streaky opac
ities in the left lower lobe.  No pneumothorax.



Additional findings: None.



IMPRESSION:

1. Interval improvement with near complete clearing of both lungs.

2. New mild left basal atelectasis.



Signer Name: Jeet Green MD 

Signed: 12/5/2019 8:52 AM

 Workstation Name: [id number]

## 2019-12-05 NOTE — PROGRESS NOTE
Subjective


Date of service: 12/05/19


Principal diagnosis: Ac Hypoxemic Resp Failure; Jesus. Pulm infiltrates; DKA; 

Sepsis


Interval history: 





Patient is seen today for: Acute Hypoxemic Respiratory Failure; Bilateral 

Pulmonary infiltrates (suspect Pulm edema over Pneumonia); DKA (diabetic 

ketoacidosis); severe metabolic acidosis


Sepsis Syndrome





Seen and examined at bedside; 24-hour events reviewed; nursing and respiratory 

care staff consulted; no adverse overnight events reported to me; resting in 

bed; 








Objective


                               Vital Signs - 12hr











  12/05/19 12/05/19 12/05/19





  00:06 05:22 08:56


 


Temperature 98.1 F 98.3 F 98.3 F


 


Pulse Rate 98 H 87 100 H


 


Pulse Rate [   





From Monitor]   


 


Respiratory 16 20 16





Rate   


 


Blood Pressure 102/71 110/80 130/93


 


O2 Sat by Pulse 99 95 94





Oximetry   














  12/05/19 12/05/19





  09:09 11:04


 


Temperature  


 


Pulse Rate 100 H 


 


Pulse Rate [  100 H





From Monitor]  


 


Respiratory  18





Rate  


 


Blood Pressure 130/93 


 


O2 Sat by Pulse  98





Oximetry  











Constitutional: no acute distress, alert, other (Young male, normocephalic with 

mildly increased respiratory effort at rest)


Eyes: non-icteric


ENT: oropharynx moist, other (Mallampati 2)


Neck: supple, no JVD


Effort: mildly labored


Ascultation: Bilateral: diminished breath sounds, rales


Percussion: Bilateral: not dull


Cardiovascular: regular rate and rhythm, other (S1,S2, no murmurs)


Gastrointestinal: normoactive bowel sounds, soft, non-tender, non-distended


Integumentary: normal


Extremities: no cyanosis, no edema, pulses normal, no ischemia or petechiae


Neurologic: normal mental status, non-focal exam, pupils equal and round, CN II-

XII normal, motor strength normal and


Psychiatric: mood appropriate, anxious


CBC and BMP: 


                                 12/04/19 13:58





                                 12/05/19 06:21


ABG, PT/INR, D-dimer: 


ABG











POC ABG pH  7.453  (7.35-7.45)  H  11/28/19  21:30    


 


POC ABG pCO2  30.0  (35-45)  L  11/28/19  21:30    


 


POC ABG pO2  < 50  ()  L  11/28/19  21:30    


 


POC ABG HCO3  21.0  (22-26 mml/L)   11/28/19  21:30    


 


POC ABG Total CO2  22  (23-27mmol/L)   11/28/19  21:30    


 


POC ABG O2 Sat  76   11/28/19  21:30    





PT/INR, D-dimer











D-Dimer  5088.17 ng/mlDDU (0-234)  H  11/28/19  11:47    








Abnormal lab findings: 


                                  Abnormal Labs











  11/26/19 11/26/19 11/26/19





  18:43 18:43 18:43


 


WBC   


 


RBC   


 


Hgb   


 


Hct   


 


MCHC   


 


Plt Count   


 


Mono % (Auto)   


 


Eos % (Auto)   


 


Eos #   


 


Seg Neutrophils %   


 


Seg Neuts % (Manual)   


 


Lymphocytes % (Manual)   


 


Seg Neutrophils # Man   


 


Lymphocytes # (Manual)   


 


Monocytes # (Manual)   


 


D-Dimer   


 


POC ABG pH   


 


POC ABG pCO2   


 


POC ABG pO2   


 


VBG pH   


 


Sodium   134 L 


 


Potassium   


 


Chloride   92.4 L 


 


Carbon Dioxide   3 L* 


 


BUN   


 


Creatinine   1.6 H 


 


Glucose   581 H* 


 


POC Glucose   


 


Hemoglobin A1c   


 


Lactic Acid   


 


Calcium   


 


Phosphorus  5.50 H  


 


Magnesium   


 


Alkaline Phosphatase    166 H


 


NT-Pro-B Natriuret Pep   


 


Total Protein   


 


Albumin   


 


Lipase   














  11/26/19 11/26/19 11/26/19





  18:43 18:43 19:04


 


WBC   


 


RBC   


 


Hgb   


 


Hct   


 


MCHC   


 


Plt Count   


 


Mono % (Auto)   


 


Eos % (Auto)   


 


Eos #   


 


Seg Neutrophils %   


 


Seg Neuts % (Manual)   


 


Lymphocytes % (Manual)   


 


Seg Neutrophils # Man   


 


Lymphocytes # (Manual)   


 


Monocytes # (Manual)   


 


D-Dimer   


 


POC ABG pH   


 


POC ABG pCO2   


 


POC ABG pO2   


 


VBG pH  6.993 L*  


 


Sodium   


 


Potassium   


 


Chloride   


 


Carbon Dioxide   


 


BUN   


 


Creatinine   


 


Glucose   


 


POC Glucose    437 H


 


Hemoglobin A1c   


 


Lactic Acid   


 


Calcium   


 


Phosphorus   


 


Magnesium   


 


Alkaline Phosphatase   


 


NT-Pro-B Natriuret Pep   


 


Total Protein   


 


Albumin   


 


Lipase   64 H 














  11/26/19 11/26/19 11/26/19





  19:40 20:14 20:45


 


WBC   24.8 H 


 


RBC   


 


Hgb   


 


Hct   


 


MCHC   31 L 


 


Plt Count   


 


Mono % (Auto)   


 


Eos % (Auto)   


 


Eos #   


 


Seg Neutrophils %   


 


Seg Neuts % (Manual)   89.0 H 


 


Lymphocytes % (Manual)   7.0 L 


 


Seg Neutrophils # Man   22.1 H 


 


Lymphocytes # (Manual)   


 


Monocytes # (Manual)   1.0 H 


 


D-Dimer   


 


POC ABG pH   


 


POC ABG pCO2   


 


POC ABG pO2   


 


VBG pH   


 


Sodium  135 L  


 


Potassium   


 


Chloride  94.4 L  


 


Carbon Dioxide  2 L*  


 


BUN   


 


Creatinine  1.6 H  


 


Glucose  536 H*  


 


POC Glucose    411 H


 


Hemoglobin A1c   


 


Lactic Acid   


 


Calcium   


 


Phosphorus  5.10 H  


 


Magnesium   


 


Alkaline Phosphatase   


 


NT-Pro-B Natriuret Pep   


 


Total Protein   


 


Albumin   


 


Lipase   














  11/26/19 11/26/19 11/26/19





  21:25 21:25 21:59


 


WBC   


 


RBC   


 


Hgb   


 


Hct   


 


MCHC   


 


Plt Count   


 


Mono % (Auto)   


 


Eos % (Auto)   


 


Eos #   


 


Seg Neutrophils %   


 


Seg Neuts % (Manual)   


 


Lymphocytes % (Manual)   


 


Seg Neutrophils # Man   


 


Lymphocytes # (Manual)   


 


Monocytes # (Manual)   


 


D-Dimer   


 


POC ABG pH   


 


POC ABG pCO2   


 


POC ABG pO2   


 


VBG pH   


 


Sodium   


 


Potassium  3.5 L  


 


Chloride   


 


Carbon Dioxide  5 L*  


 


BUN   


 


Creatinine   


 


Glucose  370 H  


 


POC Glucose    289 H


 


Hemoglobin A1c   


 


Lactic Acid   2.80 H* 


 


Calcium  8.2 L  


 


Phosphorus   


 


Magnesium   


 


Alkaline Phosphatase   


 


NT-Pro-B Natriuret Pep   


 


Total Protein   


 


Albumin   


 


Lipase   














  11/26/19 11/27/19 11/27/19





  23:16 00:32 00:37


 


WBC   


 


RBC   


 


Hgb   


 


Hct   


 


MCHC   


 


Plt Count   


 


Mono % (Auto)   


 


Eos % (Auto)   


 


Eos #   


 


Seg Neutrophils %   


 


Seg Neuts % (Manual)   


 


Lymphocytes % (Manual)   


 


Seg Neutrophils # Man   


 


Lymphocytes # (Manual)   


 


Monocytes # (Manual)   


 


D-Dimer   


 


POC ABG pH   


 


POC ABG pCO2   


 


POC ABG pO2   


 


VBG pH   


 


Sodium    148 H


 


Potassium    3.0 L


 


Chloride    116.1 H


 


Carbon Dioxide    8 L*


 


BUN   


 


Creatinine   


 


Glucose    135 H


 


POC Glucose  192 H  127 H 


 


Hemoglobin A1c   


 


Lactic Acid   


 


Calcium    7.1 L


 


Phosphorus   


 


Magnesium   


 


Alkaline Phosphatase   


 


NT-Pro-B Natriuret Pep   


 


Total Protein   


 


Albumin   


 


Lipase   














  11/27/19 11/27/19 11/27/19





  01:17 02:17 03:27


 


WBC   


 


RBC   


 


Hgb   


 


Hct   


 


MCHC   


 


Plt Count   


 


Mono % (Auto)   


 


Eos % (Auto)   


 


Eos #   


 


Seg Neutrophils %   


 


Seg Neuts % (Manual)   


 


Lymphocytes % (Manual)   


 


Seg Neutrophils # Man   


 


Lymphocytes # (Manual)   


 


Monocytes # (Manual)   


 


D-Dimer   


 


POC ABG pH   


 


POC ABG pCO2   


 


POC ABG pO2   


 


VBG pH   


 


Sodium   


 


Potassium   


 


Chloride   


 


Carbon Dioxide   


 


BUN   


 


Creatinine   


 


Glucose   


 


POC Glucose  135 H  166 H  254 H


 


Hemoglobin A1c   


 


Lactic Acid   


 


Calcium   


 


Phosphorus   


 


Magnesium   


 


Alkaline Phosphatase   


 


NT-Pro-B Natriuret Pep   


 


Total Protein   


 


Albumin   


 


Lipase   














  11/27/19 11/27/19 11/27/19





  04:13 04:34 05:41


 


WBC   


 


RBC   


 


Hgb   


 


Hct   


 


MCHC   


 


Plt Count   


 


Mono % (Auto)   


 


Eos % (Auto)   


 


Eos #   


 


Seg Neutrophils %   


 


Seg Neuts % (Manual)   


 


Lymphocytes % (Manual)   


 


Seg Neutrophils # Man   


 


Lymphocytes # (Manual)   


 


Monocytes # (Manual)   


 


D-Dimer   


 


POC ABG pH   


 


POC ABG pCO2   


 


POC ABG pO2   


 


VBG pH   


 


Sodium  147 H  


 


Potassium  3.3 L  


 


Chloride  113.4 H  


 


Carbon Dioxide  7 L*  


 


BUN   


 


Creatinine   


 


Glucose  320 H  


 


POC Glucose   320 H  333 H


 


Hemoglobin A1c   


 


Lactic Acid   


 


Calcium  7.5 L  


 


Phosphorus   


 


Magnesium   


 


Alkaline Phosphatase   


 


NT-Pro-B Natriuret Pep   


 


Total Protein   


 


Albumin   


 


Lipase   














  11/27/19 11/27/19 11/27/19





  06:20 07:27 07:53


 


WBC   


 


RBC   


 


Hgb   


 


Hct   


 


MCHC   


 


Plt Count   


 


Mono % (Auto)   


 


Eos % (Auto)   


 


Eos #   


 


Seg Neutrophils %   


 


Seg Neuts % (Manual)   


 


Lymphocytes % (Manual)   


 


Seg Neutrophils # Man   


 


Lymphocytes # (Manual)   


 


Monocytes # (Manual)   


 


D-Dimer   


 


POC ABG pH   


 


POC ABG pCO2   


 


POC ABG pO2   


 


VBG pH   


 


Sodium   


 


Potassium   


 


Chloride   


 


Carbon Dioxide   


 


BUN   


 


Creatinine   


 


Glucose   


 


POC Glucose  363 H  343 H  319 H


 


Hemoglobin A1c   


 


Lactic Acid   


 


Calcium   


 


Phosphorus   


 


Magnesium   


 


Alkaline Phosphatase   


 


NT-Pro-B Natriuret Pep   


 


Total Protein   


 


Albumin   


 


Lipase   














  11/27/19 11/27/19 11/27/19





  08:46 09:45 10:03


 


WBC   


 


RBC   


 


Hgb   


 


Hct   


 


MCHC   


 


Plt Count   


 


Mono % (Auto)   


 


Eos % (Auto)   


 


Eos #   


 


Seg Neutrophils %   


 


Seg Neuts % (Manual)   


 


Lymphocytes % (Manual)   


 


Seg Neutrophils # Man   


 


Lymphocytes # (Manual)   


 


Monocytes # (Manual)   


 


D-Dimer   


 


POC ABG pH   


 


POC ABG pCO2   


 


POC ABG pO2   


 


VBG pH   


 


Sodium    148 H


 


Potassium    3.1 L


 


Chloride    114.1 H


 


Carbon Dioxide    4 L*


 


BUN   


 


Creatinine   


 


Glucose    364 H


 


POC Glucose  402 H  340 H 


 


Hemoglobin A1c   


 


Lactic Acid   


 


Calcium    8.3 L


 


Phosphorus   


 


Magnesium   


 


Alkaline Phosphatase   


 


NT-Pro-B Natriuret Pep   


 


Total Protein   


 


Albumin   


 


Lipase   














  11/27/19 11/27/19 11/27/19





  10:42 11:26 12:53


 


WBC   


 


RBC   


 


Hgb   


 


Hct   


 


MCHC   


 


Plt Count   


 


Mono % (Auto)   


 


Eos % (Auto)   


 


Eos #   


 


Seg Neutrophils %   


 


Seg Neuts % (Manual)   


 


Lymphocytes % (Manual)   


 


Seg Neutrophils # Man   


 


Lymphocytes # (Manual)   


 


Monocytes # (Manual)   


 


D-Dimer   


 


POC ABG pH   


 


POC ABG pCO2   


 


POC ABG pO2   


 


VBG pH   


 


Sodium   


 


Potassium   


 


Chloride   


 


Carbon Dioxide   


 


BUN   


 


Creatinine   


 


Glucose   


 


POC Glucose  321 H  241 H  175 H


 


Hemoglobin A1c   


 


Lactic Acid   


 


Calcium   


 


Phosphorus   


 


Magnesium   


 


Alkaline Phosphatase   


 


NT-Pro-B Natriuret Pep   


 


Total Protein   


 


Albumin   


 


Lipase   














  11/27/19 11/27/19 11/27/19





  13:36 13:47 17:36


 


WBC   


 


RBC   


 


Hgb   


 


Hct   


 


MCHC   


 


Plt Count   


 


Mono % (Auto)   


 


Eos % (Auto)   


 


Eos #   


 


Seg Neutrophils %   


 


Seg Neuts % (Manual)   


 


Lymphocytes % (Manual)   


 


Seg Neutrophils # Man   


 


Lymphocytes # (Manual)   


 


Monocytes # (Manual)   


 


D-Dimer   


 


POC ABG pH   


 


POC ABG pCO2   


 


POC ABG pO2   


 


VBG pH   


 


Sodium  151 H  


 


Potassium  3.1 L  


 


Chloride  126.5 H  


 


Carbon Dioxide  12 L D  


 


BUN   


 


Creatinine   


 


Glucose  131 H  


 


POC Glucose   120 H  107 H


 


Hemoglobin A1c   


 


Lactic Acid   


 


Calcium  8.2 L  


 


Phosphorus   


 


Magnesium   


 


Alkaline Phosphatase   


 


NT-Pro-B Natriuret Pep   


 


Total Protein   


 


Albumin   


 


Lipase   














  11/27/19 11/27/19 11/27/19





  18:59 20:32 22:05


 


WBC   


 


RBC   


 


Hgb   


 


Hct   


 


MCHC   


 


Plt Count   


 


Mono % (Auto)   


 


Eos % (Auto)   


 


Eos #   


 


Seg Neutrophils %   


 


Seg Neuts % (Manual)   


 


Lymphocytes % (Manual)   


 


Seg Neutrophils # Man   


 


Lymphocytes # (Manual)   


 


Monocytes # (Manual)   


 


D-Dimer   


 


POC ABG pH   


 


POC ABG pCO2   


 


POC ABG pO2   


 


VBG pH   


 


Sodium   146 H 


 


Potassium   


 


Chloride   116.4 H 


 


Carbon Dioxide   10 L 


 


BUN   


 


Creatinine   


 


Glucose   267 H 


 


POC Glucose  153 H   336 H


 


Hemoglobin A1c   


 


Lactic Acid   


 


Calcium   


 


Phosphorus   


 


Magnesium   


 


Alkaline Phosphatase   


 


NT-Pro-B Natriuret Pep   


 


Total Protein   


 


Albumin   


 


Lipase   














  11/28/19 11/28/19 11/28/19





  05:00 07:47 11:21


 


WBC   


 


RBC   


 


Hgb   


 


Hct   


 


MCHC   


 


Plt Count   


 


Mono % (Auto)   


 


Eos % (Auto)   


 


Eos #   


 


Seg Neutrophils %   


 


Seg Neuts % (Manual)   


 


Lymphocytes % (Manual)   


 


Seg Neutrophils # Man   


 


Lymphocytes # (Manual)   


 


Monocytes # (Manual)   


 


D-Dimer   


 


POC ABG pH   


 


POC ABG pCO2   


 


POC ABG pO2   


 


VBG pH   


 


Sodium   


 


Potassium  3.3 L  


 


Chloride  111.1 H  


 


Carbon Dioxide  7 L*  


 


BUN  7 L  


 


Creatinine   


 


Glucose  290 H  


 


POC Glucose   328 H  390 H


 


Hemoglobin A1c   


 


Lactic Acid   


 


Calcium   


 


Phosphorus  1.30 L D  


 


Magnesium   


 


Alkaline Phosphatase   


 


NT-Pro-B Natriuret Pep   


 


Total Protein   


 


Albumin   


 


Lipase   














  11/28/19 11/28/19 11/28/19





  11:47 11:47 11:54


 


WBC   


 


RBC   


 


Hgb   


 


Hct   


 


MCHC   


 


Plt Count   


 


Mono % (Auto)   


 


Eos % (Auto)   


 


Eos #   


 


Seg Neutrophils %   


 


Seg Neuts % (Manual)   


 


Lymphocytes % (Manual)   


 


Seg Neutrophils # Man   


 


Lymphocytes # (Manual)   


 


Monocytes # (Manual)   


 


D-Dimer   5088.17 H 


 


POC ABG pH   


 


POC ABG pCO2   


 


POC ABG pO2   


 


VBG pH   


 


Sodium   


 


Potassium   


 


Chloride   


 


Carbon Dioxide  5 L*  


 


BUN  7 L  


 


Creatinine   


 


Glucose   


 


POC Glucose   


 


Hemoglobin A1c    15.1 H


 


Lactic Acid   


 


Calcium   


 


Phosphorus   


 


Magnesium   


 


Alkaline Phosphatase  150 H  


 


NT-Pro-B Natriuret Pep   


 


Total Protein  5.6 L D  


 


Albumin  3.4 L  


 


Lipase   














  11/28/19 11/28/19 11/28/19





  11:54 13:04 14:00


 


WBC  17.7 H  


 


RBC   


 


Hgb   


 


Hct   


 


MCHC   


 


Plt Count   


 


Mono % (Auto)   


 


Eos % (Auto)   


 


Eos #   


 


Seg Neutrophils %   


 


Seg Neuts % (Manual)   


 


Lymphocytes % (Manual)  4.0 L  


 


Seg Neutrophils # Man  12.2 H  


 


Lymphocytes # (Manual)  0.7 L  


 


Monocytes # (Manual)   


 


D-Dimer   


 


POC ABG pH   7.076 L 


 


POC ABG pCO2   27.9 L 


 


POC ABG pO2   71 L 


 


VBG pH   


 


Sodium   


 


Potassium   


 


Chloride   


 


Carbon Dioxide   


 


BUN   


 


Creatinine   


 


Glucose   


 


POC Glucose    371 H


 


Hemoglobin A1c   


 


Lactic Acid   


 


Calcium   


 


Phosphorus   


 


Magnesium   


 


Alkaline Phosphatase   


 


NT-Pro-B Natriuret Pep   


 


Total Protein   


 


Albumin   


 


Lipase   














  11/28/19 11/28/19 11/28/19





  15:13 16:09 17:13


 


WBC   


 


RBC   


 


Hgb   


 


Hct   


 


MCHC   


 


Plt Count   


 


Mono % (Auto)   


 


Eos % (Auto)   


 


Eos #   


 


Seg Neutrophils %   


 


Seg Neuts % (Manual)   


 


Lymphocytes % (Manual)   


 


Seg Neutrophils # Man   


 


Lymphocytes # (Manual)   


 


Monocytes # (Manual)   


 


D-Dimer   


 


POC ABG pH   


 


POC ABG pCO2   


 


POC ABG pO2   


 


VBG pH   


 


Sodium   


 


Potassium   


 


Chloride   


 


Carbon Dioxide   


 


BUN   


 


Creatinine   


 


Glucose   


 


POC Glucose  259 H  186 H  138 H


 


Hemoglobin A1c   


 


Lactic Acid   


 


Calcium   


 


Phosphorus   


 


Magnesium   


 


Alkaline Phosphatase   


 


NT-Pro-B Natriuret Pep   


 


Total Protein   


 


Albumin   


 


Lipase   














  11/28/19 11/28/19 11/28/19





  18:12 18:43 18:43


 


WBC   


 


RBC   


 


Hgb   


 


Hct   


 


MCHC   


 


Plt Count   


 


Mono % (Auto)   


 


Eos % (Auto)   


 


Eos #   


 


Seg Neutrophils %   


 


Seg Neuts % (Manual)   


 


Lymphocytes % (Manual)   


 


Seg Neutrophils # Man   


 


Lymphocytes # (Manual)   


 


Monocytes # (Manual)   


 


D-Dimer   


 


POC ABG pH   


 


POC ABG pCO2   


 


POC ABG pO2   


 


VBG pH   


 


Sodium   


 


Potassium    3.0 L


 


Chloride   


 


Carbon Dioxide    15 L D


 


BUN    7 L


 


Creatinine   


 


Glucose    132 H


 


POC Glucose  121 H  


 


Hemoglobin A1c   


 


Lactic Acid   


 


Calcium    8.3 L


 


Phosphorus   1.70 L D 


 


Magnesium   1.50 L 


 


Alkaline Phosphatase   


 


NT-Pro-B Natriuret Pep   


 


Total Protein   


 


Albumin   


 


Lipase   














  11/28/19 11/28/19 11/28/19





  18:43 18:43 19:20


 


WBC   


 


RBC   


 


Hgb   


 


Hct   


 


MCHC   


 


Plt Count   


 


Mono % (Auto)   


 


Eos % (Auto)   


 


Eos #   


 


Seg Neutrophils %   


 


Seg Neuts % (Manual)   


 


Lymphocytes % (Manual)   


 


Seg Neutrophils # Man   


 


Lymphocytes # (Manual)   


 


Monocytes # (Manual)   


 


D-Dimer   


 


POC ABG pH   


 


POC ABG pCO2   


 


POC ABG pO2   


 


VBG pH   


 


Sodium   


 


Potassium   


 


Chloride   


 


Carbon Dioxide   


 


BUN   


 


Creatinine   


 


Glucose   


 


POC Glucose    120 H


 


Hemoglobin A1c   


 


Lactic Acid  4.00 H*  


 


Calcium   


 


Phosphorus   


 


Magnesium   


 


Alkaline Phosphatase   


 


NT-Pro-B Natriuret Pep   1292 H 


 


Total Protein   


 


Albumin   


 


Lipase   














  11/28/19 11/28/19 11/28/19





  20:55 21:30 21:33


 


WBC   


 


RBC   


 


Hgb   


 


Hct   


 


MCHC   


 


Plt Count   


 


Mono % (Auto)   


 


Eos % (Auto)   


 


Eos #   


 


Seg Neutrophils %   


 


Seg Neuts % (Manual)   


 


Lymphocytes % (Manual)   


 


Seg Neutrophils # Man   


 


Lymphocytes # (Manual)   


 


Monocytes # (Manual)   


 


D-Dimer   


 


POC ABG pH   7.453 H 


 


POC ABG pCO2  30.5 L  30.0 L 


 


POC ABG pO2   < 50 L 


 


VBG pH   


 


Sodium   


 


Potassium   


 


Chloride   


 


Carbon Dioxide   


 


BUN   


 


Creatinine   


 


Glucose   


 


POC Glucose    121 H


 


Hemoglobin A1c   


 


Lactic Acid   


 


Calcium   


 


Phosphorus   


 


Magnesium   


 


Alkaline Phosphatase   


 


NT-Pro-B Natriuret Pep   


 


Total Protein   


 


Albumin   


 


Lipase   














  11/28/19 11/28/19 11/28/19





  21:59 22:40 22:53


 


WBC   


 


RBC   


 


Hgb   


 


Hct   


 


MCHC   


 


Plt Count   


 


Mono % (Auto)   


 


Eos % (Auto)   


 


Eos #   


 


Seg Neutrophils %   


 


Seg Neuts % (Manual)   


 


Lymphocytes % (Manual)   


 


Seg Neutrophils # Man   


 


Lymphocytes # (Manual)   


 


Monocytes # (Manual)   


 


D-Dimer   


 


POC ABG pH   


 


POC ABG pCO2   


 


POC ABG pO2   


 


VBG pH   


 


Sodium   


 


Potassium  3.0 L   2.6 L*


 


Chloride  107.9 H  


 


Carbon Dioxide  19 L   21 L


 


BUN  7 L   7 L


 


Creatinine   


 


Glucose  138 H   148 H


 


POC Glucose   137 H 


 


Hemoglobin A1c   


 


Lactic Acid   


 


Calcium  7.9 L   7.9 L


 


Phosphorus   


 


Magnesium   


 


Alkaline Phosphatase   


 


NT-Pro-B Natriuret Pep   


 


Total Protein   


 


Albumin   


 


Lipase   














  11/28/19 11/29/19 11/29/19





  23:41 02:54 03:51


 


WBC   


 


RBC   


 


Hgb   


 


Hct   


 


MCHC   


 


Plt Count   


 


Mono % (Auto)   


 


Eos % (Auto)   


 


Eos #   


 


Seg Neutrophils %   


 


Seg Neuts % (Manual)   


 


Lymphocytes % (Manual)   


 


Seg Neutrophils # Man   


 


Lymphocytes # (Manual)   


 


Monocytes # (Manual)   


 


D-Dimer   


 


POC ABG pH   


 


POC ABG pCO2   


 


POC ABG pO2   


 


VBG pH   


 


Sodium   


 


Potassium   


 


Chloride   


 


Carbon Dioxide   


 


BUN   


 


Creatinine   


 


Glucose   


 


POC Glucose  115 H  143 H  126 H


 


Hemoglobin A1c   


 


Lactic Acid   


 


Calcium   


 


Phosphorus   


 


Magnesium   


 


Alkaline Phosphatase   


 


NT-Pro-B Natriuret Pep   


 


Total Protein   


 


Albumin   


 


Lipase   














  11/29/19 11/29/19 11/29/19





  04:42 05:06 05:44


 


WBC   


 


RBC   


 


Hgb   


 


Hct   


 


MCHC   


 


Plt Count   


 


Mono % (Auto)   


 


Eos % (Auto)   


 


Eos #   


 


Seg Neutrophils %   


 


Seg Neuts % (Manual)   


 


Lymphocytes % (Manual)   


 


Seg Neutrophils # Man   


 


Lymphocytes # (Manual)   


 


Monocytes # (Manual)   


 


D-Dimer   


 


POC ABG pH   


 


POC ABG pCO2   


 


POC ABG pO2   


 


VBG pH   


 


Sodium   


 


Potassium   3.0 L 


 


Chloride   108.2 H 


 


Carbon Dioxide   19 L 


 


BUN   7 L 


 


Creatinine   


 


Glucose   173 H 


 


POC Glucose  127 H   163 H


 


Hemoglobin A1c   


 


Lactic Acid   


 


Calcium   8.1 L 


 


Phosphorus   1.40 L 


 


Magnesium   1.50 L 


 


Alkaline Phosphatase   


 


NT-Pro-B Natriuret Pep   


 


Total Protein   


 


Albumin   


 


Lipase   














  11/29/19 11/29/19 11/29/19





  06:41 08:36 09:31


 


WBC   


 


RBC   


 


Hgb   


 


Hct   


 


MCHC   


 


Plt Count   


 


Mono % (Auto)   


 


Eos % (Auto)   


 


Eos #   


 


Seg Neutrophils %   


 


Seg Neuts % (Manual)   


 


Lymphocytes % (Manual)   


 


Seg Neutrophils # Man   


 


Lymphocytes # (Manual)   


 


Monocytes # (Manual)   


 


D-Dimer   


 


POC ABG pH   


 


POC ABG pCO2   


 


POC ABG pO2   


 


VBG pH   


 


Sodium   


 


Potassium   


 


Chloride   


 


Carbon Dioxide   


 


BUN   


 


Creatinine   


 


Glucose   


 


POC Glucose  159 H  132 H  148 H


 


Hemoglobin A1c   


 


Lactic Acid   


 


Calcium   


 


Phosphorus   


 


Magnesium   


 


Alkaline Phosphatase   


 


NT-Pro-B Natriuret Pep   


 


Total Protein   


 


Albumin   


 


Lipase   














  11/29/19 11/29/19 11/29/19





  10:42 11:53 13:28


 


WBC   


 


RBC   


 


Hgb   


 


Hct   


 


MCHC   


 


Plt Count   


 


Mono % (Auto)   


 


Eos % (Auto)   


 


Eos #   


 


Seg Neutrophils %   


 


Seg Neuts % (Manual)   


 


Lymphocytes % (Manual)   


 


Seg Neutrophils # Man   


 


Lymphocytes # (Manual)   


 


Monocytes # (Manual)   


 


D-Dimer   


 


POC ABG pH   


 


POC ABG pCO2   


 


POC ABG pO2   


 


VBG pH   


 


Sodium   


 


Potassium    3.2 L


 


Chloride   


 


Carbon Dioxide    14 L


 


BUN    7 L


 


Creatinine   


 


Glucose    256 H


 


POC Glucose  191 H  181 H 


 


Hemoglobin A1c   


 


Lactic Acid   


 


Calcium    8.0 L


 


Phosphorus   


 


Magnesium   


 


Alkaline Phosphatase   


 


NT-Pro-B Natriuret Pep   


 


Total Protein   


 


Albumin   


 


Lipase   














  11/29/19 11/29/19 11/30/19





  16:14 22:24 06:25


 


WBC   


 


RBC   


 


Hgb   


 


Hct   


 


MCHC   


 


Plt Count   


 


Mono % (Auto)   


 


Eos % (Auto)   


 


Eos #   


 


Seg Neutrophils %   


 


Seg Neuts % (Manual)   


 


Lymphocytes % (Manual)   


 


Seg Neutrophils # Man   


 


Lymphocytes # (Manual)   


 


Monocytes # (Manual)   


 


D-Dimer   


 


POC ABG pH   


 


POC ABG pCO2   


 


POC ABG pO2   


 


VBG pH   


 


Sodium    148 H


 


Potassium    3.2 L


 


Chloride    116.3 H


 


Carbon Dioxide    17 L


 


BUN    7 L


 


Creatinine   


 


Glucose    192 H


 


POC Glucose  334 H  106 H 


 


Hemoglobin A1c   


 


Lactic Acid   


 


Calcium    8.3 L


 


Phosphorus   


 


Magnesium   


 


Alkaline Phosphatase   


 


NT-Pro-B Natriuret Pep   


 


Total Protein   


 


Albumin   


 


Lipase   














  11/30/19 11/30/19 11/30/19





  07:38 12:48 16:24


 


WBC   


 


RBC   


 


Hgb   


 


Hct   


 


MCHC   


 


Plt Count   


 


Mono % (Auto)   


 


Eos % (Auto)   


 


Eos #   


 


Seg Neutrophils %   


 


Seg Neuts % (Manual)   


 


Lymphocytes % (Manual)   


 


Seg Neutrophils # Man   


 


Lymphocytes # (Manual)   


 


Monocytes # (Manual)   


 


D-Dimer   


 


POC ABG pH   


 


POC ABG pCO2   


 


POC ABG pO2   


 


VBG pH   


 


Sodium   


 


Potassium   


 


Chloride   


 


Carbon Dioxide   


 


BUN   


 


Creatinine   


 


Glucose   


 


POC Glucose  200 H  156 H  223 H


 


Hemoglobin A1c   


 


Lactic Acid   


 


Calcium   


 


Phosphorus   


 


Magnesium   


 


Alkaline Phosphatase   


 


NT-Pro-B Natriuret Pep   


 


Total Protein   


 


Albumin   


 


Lipase   














  11/30/19 12/01/19 12/01/19





  21:27 06:00 09:30


 


WBC   


 


RBC   


 


Hgb    11.2 L


 


Hct    33.7 L D


 


MCHC   


 


Plt Count    58 L


 


Elbert % (Auto)   


 


Eos % (Auto)    4.7 H


 


Eos #   


 


Seg Neutrophils %    75.7 H


 


Seg Neuts % (Manual)   


 


Lymphocytes % (Manual)   


 


Seg Neutrophils # Man   


 


Lymphocytes # (Manual)   


 


Monocytes # (Manual)   


 


D-Dimer   


 


POC ABG pH   


 


POC ABG pCO2   


 


POC ABG pO2   


 


VBG pH   


 


Sodium   


 


Potassium   2.5 L* D 


 


Chloride   


 


Carbon Dioxide   19 L 


 


BUN   5 L 


 


Creatinine   


 


Glucose   42 L 


 


POC Glucose  219 H  


 


Hemoglobin A1c   


 


Lactic Acid   


 


Calcium   8.1 L 


 


Phosphorus   


 


Magnesium   


 


Alkaline Phosphatase   


 


NT-Pro-B Natriuret Pep   


 


Total Protein   


 


Albumin   


 


Lipase   














  12/01/19 12/01/19 12/02/19





  11:57 22:08 01:15


 


WBC   


 


RBC   


 


Hgb   


 


Hct   


 


MCHC   


 


Plt Count   


 


Mono % (Auto)   


 


Eos % (Auto)   


 


Eos #   


 


Seg Neutrophils %   


 


Seg Neuts % (Manual)   


 


Lymphocytes % (Manual)   


 


Seg Neutrophils # Man   


 


Lymphocytes # (Manual)   


 


Monocytes # (Manual)   


 


D-Dimer   


 


POC ABG pH   


 


POC ABG pCO2   


 


POC ABG pO2   


 


VBG pH   


 


Sodium   


 


Potassium   


 


Chloride   


 


Carbon Dioxide   


 


BUN   


 


Creatinine   


 


Glucose   


 


POC Glucose  237 H  55 L  < 40 L


 


Hemoglobin A1c   


 


Lactic Acid   


 


Calcium   


 


Phosphorus   


 


Magnesium   


 


Alkaline Phosphatase   


 


NT-Pro-B Natriuret Pep   


 


Total Protein   


 


Albumin   


 


Lipase   














  12/02/19 12/02/19 12/02/19





  02:05 07:49 11:46


 


WBC   


 


RBC   


 


Hgb   


 


Hct   


 


MCHC   


 


Plt Count   


 


Mono % (Auto)   


 


Eos % (Auto)   


 


Eos #   


 


Seg Neutrophils %   


 


Seg Neuts % (Manual)   


 


Lymphocytes % (Manual)   


 


Seg Neutrophils # Man   


 


Lymphocytes # (Manual)   


 


Monocytes # (Manual)   


 


D-Dimer   


 


POC ABG pH   


 


POC ABG pCO2   


 


POC ABG pO2   


 


VBG pH   


 


Sodium   


 


Potassium   2.9 L* 


 


Chloride   


 


Carbon Dioxide   20 L 


 


BUN   3 L 


 


Creatinine   


 


Glucose   188 H 


 


POC Glucose  159 H   182 H


 


Hemoglobin A1c   


 


Lactic Acid   


 


Calcium   8.1 L 


 


Phosphorus   


 


Magnesium   


 


Alkaline Phosphatase   


 


NT-Pro-B Natriuret Pep   


 


Total Protein   


 


Albumin   


 


Lipase   














  12/02/19 12/02/19 12/02/19





  17:06 18:08 21:55


 


WBC   


 


RBC   


 


Hgb   


 


Hct   


 


MCHC   


 


Plt Count   


 


Mono % (Auto)   


 


Eos % (Auto)   


 


Eos #   


 


Seg Neutrophils %   


 


Seg Neuts % (Manual)   


 


Lymphocytes % (Manual)   


 


Seg Neutrophils # Man   


 


Lymphocytes # (Manual)   


 


Monocytes # (Manual)   


 


D-Dimer   


 


POC ABG pH   


 


POC ABG pCO2   


 


POC ABG pO2   


 


VBG pH   


 


Sodium   


 


Potassium   


 


Chloride   


 


Carbon Dioxide   


 


BUN   


 


Creatinine   


 


Glucose   


 


POC Glucose  < 40 L  111 H  255 H


 


Hemoglobin A1c   


 


Lactic Acid   


 


Calcium   


 


Phosphorus   


 


Magnesium   


 


Alkaline Phosphatase   


 


NT-Pro-B Natriuret Pep   


 


Total Protein   


 


Albumin   


 


Lipase   














  12/02/19 12/03/19 12/03/19





  Unknown 04:50 04:50


 


WBC   


 


RBC   3.26 L 


 


Hgb  11.1 L  9.3 L 


 


Hct  32.7 L  27.6 L 


 


MCHC   


 


Plt Count  38 L  30 L 


 


Mono % (Auto)  7.4 H  


 


Eos % (Auto)  9.0 H  


 


Eos #  0.6 H  


 


Seg Neutrophils %   


 


Seg Neuts % (Manual)   


 


Lymphocytes % (Manual)   


 


Seg Neutrophils # Man   


 


Lymphocytes # (Manual)   


 


Monocytes # (Manual)   


 


D-Dimer   


 


POC ABG pH   


 


POC ABG pCO2   


 


POC ABG pO2   


 


VBG pH   


 


Sodium   


 


Potassium    3.3 L D


 


Chloride    112.5 H


 


Carbon Dioxide   


 


BUN    4 L


 


Creatinine   


 


Glucose    275 H


 


POC Glucose   


 


Hemoglobin A1c   


 


Lactic Acid   


 


Calcium    7.4 L


 


Phosphorus   


 


Magnesium   


 


Alkaline Phosphatase   


 


NT-Pro-B Natriuret Pep   


 


Total Protein   


 


Albumin   


 


Lipase   














  12/03/19 12/03/19 12/03/19





  07:36 11:29 16:12


 


WBC   


 


RBC   


 


Hgb   


 


Hct   


 


MCHC   


 


Plt Count   


 


Mono % (Auto)   


 


Eos % (Auto)   


 


Eos #   


 


Seg Neutrophils %   


 


Seg Neuts % (Manual)   


 


Lymphocytes % (Manual)   


 


Seg Neutrophils # Man   


 


Lymphocytes # (Manual)   


 


Monocytes # (Manual)   


 


D-Dimer   


 


POC ABG pH   


 


POC ABG pCO2   


 


POC ABG pO2   


 


VBG pH   


 


Sodium   


 


Potassium   


 


Chloride   


 


Carbon Dioxide   


 


BUN   


 


Creatinine   


 


Glucose   


 


POC Glucose  294 H  460 H  < 40 L


 


Hemoglobin A1c   


 


Lactic Acid   


 


Calcium   


 


Phosphorus   


 


Magnesium   


 


Alkaline Phosphatase   


 


NT-Pro-B Natriuret Pep   


 


Total Protein   


 


Albumin   


 


Lipase   














  12/03/19 12/04/19 12/04/19





  21:14 03:36 11:22


 


WBC   


 


RBC   


 


Hgb   


 


Hct   


 


MCHC   


 


Plt Count   


 


Mono % (Auto)   


 


Eos % (Auto)   


 


Eos #   


 


Seg Neutrophils %   


 


Seg Neuts % (Manual)   


 


Lymphocytes % (Manual)   


 


Seg Neutrophils # Man   


 


Lymphocytes # (Manual)   


 


Monocytes # (Manual)   


 


D-Dimer   


 


POC ABG pH   


 


POC ABG pCO2   


 


POC ABG pO2   


 


VBG pH   


 


Sodium   


 


Potassium   3.5 L 


 


Chloride   110.7 H 


 


Carbon Dioxide   21 L 


 


BUN   3 L 


 


Creatinine   


 


Glucose   147 H 


 


POC Glucose  240 H   122 H


 


Hemoglobin A1c   


 


Lactic Acid   


 


Calcium   7.7 L 


 


Phosphorus   


 


Magnesium   


 


Alkaline Phosphatase   


 


NT-Pro-B Natriuret Pep   


 


Total Protein   


 


Albumin   


 


Lipase   














  12/04/19 12/04/19 12/05/19





  13:58 20:48 06:21


 


WBC   


 


RBC  3.48 L  


 


Hgb  9.9 L  


 


Hct  29.5 L  


 


MCHC   


 


Plt Count  79 L D  


 


Mono % (Auto)   


 


Eos % (Auto)   


 


Eos #   


 


Seg Neutrophils %   


 


Seg Neuts % (Manual)   


 


Lymphocytes % (Manual)   


 


Seg Neutrophils # Man   


 


Lymphocytes # (Manual)   


 


Monocytes # (Manual)   


 


D-Dimer   


 


POC ABG pH   


 


POC ABG pCO2   


 


POC ABG pO2   


 


VBG pH   


 


Sodium   


 


Potassium   


 


Chloride   


 


Carbon Dioxide   


 


BUN    3 L


 


Creatinine    0.7 L


 


Glucose   


 


POC Glucose   358 H 


 


Hemoglobin A1c   


 


Lactic Acid   


 


Calcium    7.6 L


 


Phosphorus   


 


Magnesium   


 


Alkaline Phosphatase   


 


NT-Pro-B Natriuret Pep   


 


Total Protein   


 


Albumin   


 


Lipase   














  12/05/19





  09:04


 


WBC 


 


RBC 


 


Hgb 


 


Hct 


 


MCHC 


 


Plt Count 


 


Mono % (Auto) 


 


Eos % (Auto) 


 


Eos # 


 


Seg Neutrophils % 


 


Seg Neuts % (Manual) 


 


Lymphocytes % (Manual) 


 


Seg Neutrophils # Man 


 


Lymphocytes # (Manual) 


 


Monocytes # (Manual) 


 


D-Dimer 


 


POC ABG pH 


 


POC ABG pCO2 


 


POC ABG pO2 


 


VBG pH 


 


Sodium 


 


Potassium 


 


Chloride 


 


Carbon Dioxide 


 


BUN 


 


Creatinine 


 


Glucose 


 


POC Glucose  146 H


 


Hemoglobin A1c 


 


Lactic Acid 


 


Calcium 


 


Phosphorus 


 


Magnesium 


 


Alkaline Phosphatase 


 


NT-Pro-B Natriuret Pep 


 


Total Protein 


 


Albumin 


 


Lipase 











Allied health notes reviewed: nursing

## 2020-06-25 ENCOUNTER — TELEPHONE ENCOUNTER (OUTPATIENT)
Dept: URBAN - METROPOLITAN AREA CLINIC 118 | Facility: CLINIC | Age: 40
End: 2020-06-25

## 2020-07-06 ENCOUNTER — OFFICE VISIT (OUTPATIENT)
Dept: URBAN - METROPOLITAN AREA SURGERY CENTER 23 | Facility: SURGERY CENTER | Age: 40
End: 2020-07-06
Payer: COMMERCIAL

## 2020-07-06 ENCOUNTER — CLAIMS CREATED FROM THE CLAIM WINDOW (OUTPATIENT)
Dept: URBAN - METROPOLITAN AREA CLINIC 4 | Facility: CLINIC | Age: 40
End: 2020-07-06
Payer: COMMERCIAL

## 2020-07-06 DIAGNOSIS — R10.9 UNSPECIFIED ABDOMINAL PAIN: ICD-10-CM

## 2020-07-06 DIAGNOSIS — R11.2 NAUSEA WITH VOMITING, UNSPECIFIED: ICD-10-CM

## 2020-07-06 DIAGNOSIS — K63.89 BACTERIAL OVERGROWTH SYNDROME: ICD-10-CM

## 2020-07-06 DIAGNOSIS — R19.7 ACUTE DIARRHEA: ICD-10-CM

## 2020-07-06 DIAGNOSIS — K21.9 ACID REFLUX: ICD-10-CM

## 2020-07-06 DIAGNOSIS — R25.9 UNSPECIFIED ABNORMAL INVOLUNTARY MOVEMENTS: ICD-10-CM

## 2020-07-06 DIAGNOSIS — D50.9 IRON DEFICIENCY ANEMIA, UNSPECIFIED: ICD-10-CM

## 2020-07-06 DIAGNOSIS — K21.0 GASTRO-ESOPHAGEAL REFLUX DISEASE WITH ESOPHAGITIS: ICD-10-CM

## 2020-07-06 DIAGNOSIS — R63.4 ABNORMAL WEIGHT LOSS: ICD-10-CM

## 2020-07-06 PROCEDURE — 45380 COLONOSCOPY AND BIOPSY: CPT | Performed by: INTERNAL MEDICINE

## 2020-07-06 PROCEDURE — 43239 EGD BIOPSY SINGLE/MULTIPLE: CPT | Performed by: INTERNAL MEDICINE

## 2020-07-06 PROCEDURE — G9937 DIG OR SURV COLSCO: HCPCS | Performed by: INTERNAL MEDICINE

## 2020-07-06 PROCEDURE — 88305 TISSUE EXAM BY PATHOLOGIST: CPT | Performed by: PATHOLOGY

## 2020-07-06 PROCEDURE — G8907 PT DOC NO EVENTS ON DISCHARG: HCPCS | Performed by: INTERNAL MEDICINE

## 2020-07-06 RX ORDER — HUMAN INSULIN 100 [IU]/ML
INJECTION, SOLUTION SUBCUTANEOUS
Qty: 0 | Refills: 0 | Status: ACTIVE | COMMUNITY
Start: 1900-01-01 | End: 1900-01-01

## 2020-07-07 ENCOUNTER — TELEPHONE ENCOUNTER (OUTPATIENT)
Dept: URBAN - METROPOLITAN AREA CLINIC 118 | Facility: CLINIC | Age: 40
End: 2020-07-07

## 2020-07-29 ENCOUNTER — LAB OUTSIDE AN ENCOUNTER (OUTPATIENT)
Dept: URBAN - METROPOLITAN AREA TELEHEALTH 2 | Facility: TELEHEALTH | Age: 40
End: 2020-07-29

## 2020-07-29 ENCOUNTER — OFFICE VISIT (OUTPATIENT)
Dept: URBAN - METROPOLITAN AREA TELEHEALTH 2 | Facility: TELEHEALTH | Age: 40
End: 2020-07-29
Payer: COMMERCIAL

## 2020-07-29 DIAGNOSIS — R10.84 ABDOMINAL CRAMPING, GENERALIZED: ICD-10-CM

## 2020-07-29 DIAGNOSIS — R74.8 ELEVATED LIVER ENZYMES: ICD-10-CM

## 2020-07-29 DIAGNOSIS — R93.3 ABNORMAL CT SCAN, ESOPHAGUS: ICD-10-CM

## 2020-07-29 DIAGNOSIS — R10.9 UNSPECIFIED ABDOMINAL PAIN: ICD-10-CM

## 2020-07-29 DIAGNOSIS — R11.2 NAUSEA WITH VOMITING, UNSPECIFIED: ICD-10-CM

## 2020-07-29 PROCEDURE — G8427 DOCREV CUR MEDS BY ELIG CLIN: HCPCS | Performed by: INTERNAL MEDICINE

## 2020-07-29 PROCEDURE — 1036F TOBACCO NON-USER: CPT | Performed by: INTERNAL MEDICINE

## 2020-07-29 PROCEDURE — G8418 CALC BMI BLW LOW PARAM F/U: HCPCS | Performed by: INTERNAL MEDICINE

## 2020-07-29 PROCEDURE — 99214 OFFICE O/P EST MOD 30 MIN: CPT | Performed by: INTERNAL MEDICINE

## 2020-07-29 PROCEDURE — G9903 PT SCRN TBCO ID AS NON USER: HCPCS | Performed by: INTERNAL MEDICINE

## 2020-07-29 RX ORDER — HUMAN INSULIN 100 [IU]/ML
INJECTION, SOLUTION SUBCUTANEOUS
Qty: 0 | Refills: 0 | Status: ACTIVE | COMMUNITY
Start: 1900-01-01

## 2020-07-29 RX ORDER — METOCLOPRAMIDE HYDROCHLORIDE 5 MG/1
1 TABLET BEFORE MEALS TABLET ORAL TWICE A DAY
Qty: 60 | Refills: 1 | OUTPATIENT
Start: 2020-07-29

## 2020-07-29 NOTE — HPI-TODAY'S VISIT:
EGD/colon were essentially normal he did have a small amount of residual food in the stomach Patient reports for the last 1 to 2 weeks he has been getting headaches he currently has severe abdominal pain nausea he reports that when the light is on it is even worse, therefore our telehealth exam is mostly in the dark as he has the lights off in his bedroom Reports the pain is diffuse severe worse with light and palpation better with nothing sharp nonradiating associated with nausea vomiting photophobia headaches he reports the symptoms are intermittent will last for hours at a time then improve he also reports he is sleeping more than usual no confusion He reports his glycemic control is good

## 2020-08-19 ENCOUNTER — TELEPHONE ENCOUNTER (OUTPATIENT)
Dept: URBAN - METROPOLITAN AREA CLINIC 118 | Facility: CLINIC | Age: 40
End: 2020-08-19

## 2020-08-25 ENCOUNTER — OFFICE VISIT (OUTPATIENT)
Dept: URBAN - METROPOLITAN AREA TELEHEALTH 2 | Facility: TELEHEALTH | Age: 40
End: 2020-08-25
Payer: COMMERCIAL

## 2020-08-25 DIAGNOSIS — K31.84 GASTROPARESIS: ICD-10-CM

## 2020-08-25 PROCEDURE — 97802 MEDICAL NUTRITION INDIV IN: CPT | Performed by: DIETITIAN, REGISTERED

## 2020-08-25 RX ORDER — HUMAN INSULIN 100 [IU]/ML
INJECTION, SOLUTION SUBCUTANEOUS
Qty: 0 | Refills: 0 | Status: ACTIVE | COMMUNITY
Start: 1900-01-01

## 2020-08-25 RX ORDER — METOCLOPRAMIDE HYDROCHLORIDE 5 MG/1
1 TABLET BEFORE MEALS TABLET ORAL TWICE A DAY
Qty: 60 | Refills: 1 | Status: ACTIVE | COMMUNITY
Start: 2020-07-29

## 2020-08-25 NOTE — HPI-OTHER HISTORIES
Weight started dropping 5 years ago when he was first diagnosed with gastroparesis 5-6 years ago.  Type 1 DM.  Diagnosed 10 years ago at age 29.  Avg BG  now with insulin pump (Jan/ Feb 2020).  Pt is having regular diarrhea and vomiting since 5-6 years ago.  Pt says he can sometimes tolerate things like rice and other times he cannot.  24 hour recall: smoothie spinach, pineapple, blackberries, orange juice.  Lunch:  [employer] frozen meal, 2pm coffee with Cuban vanilla cream and stevia, 4:30pm vomited,  6:30pm 6 buffalo wings and blue cheese and  hibachi chicken fried rice and 2 cups.   4 bottles of water over course of the day (2 liters).  Last A1C was 7.7%.

## 2020-09-08 ENCOUNTER — OFFICE VISIT (OUTPATIENT)
Dept: URBAN - METROPOLITAN AREA TELEHEALTH 2 | Facility: TELEHEALTH | Age: 40
End: 2020-09-08

## 2020-09-09 ENCOUNTER — OFFICE VISIT (OUTPATIENT)
Dept: URBAN - METROPOLITAN AREA TELEHEALTH 2 | Facility: TELEHEALTH | Age: 40
End: 2020-09-09
Payer: COMMERCIAL

## 2020-09-09 DIAGNOSIS — K31.84 GASTROPARESIS: ICD-10-CM

## 2020-09-09 DIAGNOSIS — E11.9 DIABETES: ICD-10-CM

## 2020-09-09 PROCEDURE — 97803 MED NUTRITION INDIV SUBSEQ: CPT | Performed by: DIETITIAN, REGISTERED

## 2020-09-09 RX ORDER — HUMAN INSULIN 100 [IU]/ML
INJECTION, SOLUTION SUBCUTANEOUS
Qty: 0 | Refills: 0 | Status: ACTIVE | COMMUNITY
Start: 1900-01-01

## 2020-09-09 RX ORDER — METOCLOPRAMIDE HYDROCHLORIDE 5 MG/1
1 TABLET BEFORE MEALS TABLET ORAL TWICE A DAY
Qty: 60 | Refills: 1 | Status: ACTIVE | COMMUNITY
Start: 2020-07-29

## 2020-09-15 ENCOUNTER — OUT OF OFFICE VISIT (OUTPATIENT)
Dept: URBAN - METROPOLITAN AREA MEDICAL CENTER 25 | Facility: MEDICAL CENTER | Age: 40
End: 2020-09-15
Payer: COMMERCIAL

## 2020-09-15 DIAGNOSIS — K92.0 BLOODY EMESIS: ICD-10-CM

## 2020-09-15 DIAGNOSIS — E11.43 DIABETIC AUTONOMIC NEUROPATHY ASSOCIATED WITH TYPE 2 DIABETES MELLITUS: ICD-10-CM

## 2020-09-15 DIAGNOSIS — R07.89 ACUTE CHEST WALL PAIN: ICD-10-CM

## 2020-09-15 PROCEDURE — 99232 SBSQ HOSP IP/OBS MODERATE 35: CPT | Performed by: INTERNAL MEDICINE

## 2020-09-15 PROCEDURE — 99254 IP/OBS CNSLTJ NEW/EST MOD 60: CPT | Performed by: INTERNAL MEDICINE

## 2020-09-23 ENCOUNTER — OFFICE VISIT (OUTPATIENT)
Dept: URBAN - METROPOLITAN AREA TELEHEALTH 2 | Facility: TELEHEALTH | Age: 40
End: 2020-09-23

## 2021-02-12 ENCOUNTER — OUT OF OFFICE VISIT (OUTPATIENT)
Dept: URBAN - METROPOLITAN AREA MEDICAL CENTER 25 | Facility: MEDICAL CENTER | Age: 41
End: 2021-02-12
Payer: COMMERCIAL

## 2021-02-12 DIAGNOSIS — K92.0 BLOODY EMESIS: ICD-10-CM

## 2021-02-12 DIAGNOSIS — K92.1 BLACK STOOL: ICD-10-CM

## 2021-02-12 DIAGNOSIS — R93.3 ABN FINDINGS-GI TRACT: ICD-10-CM

## 2021-02-12 DIAGNOSIS — R07.89 ACUTE CHEST WALL PAIN: ICD-10-CM

## 2021-02-12 PROCEDURE — 99254 IP/OBS CNSLTJ NEW/EST MOD 60: CPT | Performed by: INTERNAL MEDICINE

## 2021-02-13 ENCOUNTER — OUT OF OFFICE VISIT (OUTPATIENT)
Dept: URBAN - METROPOLITAN AREA MEDICAL CENTER 25 | Facility: MEDICAL CENTER | Age: 41
End: 2021-02-13
Payer: COMMERCIAL

## 2021-02-13 DIAGNOSIS — K31.84 DIABETIC GASTROPARESIS: ICD-10-CM

## 2021-02-13 DIAGNOSIS — K22.8 COLUMNAR-LINED ESOPHAGUS: ICD-10-CM

## 2021-02-13 DIAGNOSIS — R06.6 CHRONIC HICCOUGHS: ICD-10-CM

## 2021-02-13 PROCEDURE — 99232 SBSQ HOSP IP/OBS MODERATE 35: CPT | Performed by: INTERNAL MEDICINE

## 2021-04-20 ENCOUNTER — OFFICE VISIT (OUTPATIENT)
Dept: URBAN - METROPOLITAN AREA CLINIC 128 | Facility: CLINIC | Age: 41
End: 2021-04-20
Payer: COMMERCIAL

## 2021-04-20 ENCOUNTER — DASHBOARD ENCOUNTERS (OUTPATIENT)
Age: 41
End: 2021-04-20

## 2021-04-20 ENCOUNTER — WEB ENCOUNTER (OUTPATIENT)
Dept: URBAN - METROPOLITAN AREA CLINIC 128 | Facility: CLINIC | Age: 41
End: 2021-04-20

## 2021-04-20 VITALS
DIASTOLIC BLOOD PRESSURE: 96 MMHG | HEIGHT: 64 IN | HEART RATE: 85 BPM | TEMPERATURE: 97.2 F | WEIGHT: 112 LBS | SYSTOLIC BLOOD PRESSURE: 128 MMHG | BODY MASS INDEX: 19.12 KG/M2

## 2021-04-20 DIAGNOSIS — K31.84 GASTROPARESIS: ICD-10-CM

## 2021-04-20 PROBLEM — 235675006: Status: ACTIVE | Noted: 2021-04-20

## 2021-04-20 PROCEDURE — 99204 OFFICE O/P NEW MOD 45 MIN: CPT | Performed by: PHYSICIAN ASSISTANT

## 2021-04-20 RX ORDER — METOCLOPRAMIDE HYDROCHLORIDE 5 MG/1
1 TABLET BEFORE MEALS TABLET ORAL TWICE A DAY
Qty: 60 | Refills: 1 | Status: ACTIVE | COMMUNITY
Start: 2020-07-29

## 2021-04-20 RX ORDER — HUMAN INSULIN 100 [IU]/ML
INJECTION, SOLUTION SUBCUTANEOUS
Qty: 0 | Refills: 0 | Status: ACTIVE | COMMUNITY
Start: 1900-01-01

## 2021-04-20 NOTE — PHYSICAL EXAM GASTROINTESTINAL
Abdomen , soft, nontender, nondistended , no guarding or rigidity , no masses palpable , normal bowel sounds, negative Walker's sign, negative psoas and obturators noted, negative CVA tenderness bilaterally Liver and Spleen , no hepatosplenomegaly Rectal deferred

## 2021-04-20 NOTE — HPI-TODAY'S VISIT:
The patient is having feeling abdominal pain bicarbonate reflux which is chronic due to his gastroparesis.  He is type I diabetic and is on an insulin pump.  He elicits diarrhea 5 bowel movements a day.  His last EGD was 7/20/2020 and his last colonoscopy was 7/20/2020.  He is requesting [facility] paperwork to be done as he usually has 3 to 15 days a month when he is off of work due to his issues with his gastroparesis.  The Questran powder he has been on has not helped.  He was seen on 4/12/2021 for hospital visit at [facility]. His white blood cell count was 12.6.  He was diagnosed with diabetes with hyperglycemia nausea vomiting.  They felt this was a flare of his gastroparesis versus food poisoning as he had eaten [employer] prior to his ED visit.  His hemoglobin was 11.6 hematocrit was 33.9.  At that time was glucose was at 313. HIs KUB x-ray revealed a nonobstructive gas pattern.

## 2022-01-20 ENCOUNTER — OFFICE VISIT (OUTPATIENT)
Dept: URBAN - METROPOLITAN AREA CLINIC 27 | Facility: CLINIC | Age: 42
End: 2022-01-20

## 2022-01-20 PROBLEM — 62315008 DIARRHEA: Status: ACTIVE | Noted: 2022-01-20

## 2022-01-20 PROBLEM — 428283002 HISTORY OF POLYP OF COLON (SITUATION): Status: ACTIVE | Noted: 2022-01-20

## 2022-01-20 PROBLEM — 102614006 GENERALIZED ABDOMINAL PAIN: Status: ACTIVE | Noted: 2022-01-20

## 2022-01-20 PROBLEM — 313436004 TYPE II DIABETES MELLITUS WITHOUT COMPLICATION: Status: ACTIVE | Noted: 2022-01-20

## 2022-01-21 ENCOUNTER — LAB OUTSIDE AN ENCOUNTER (OUTPATIENT)
Dept: URBAN - METROPOLITAN AREA CLINIC 121 | Facility: CLINIC | Age: 42
End: 2022-01-21

## 2022-01-21 LAB
A/G RATIO: (no result)
ALBUMIN: 4.2
ALK PHOS: 102
BASOPH COUNT: (no result)
BASOPHIL %: 0.7
BG RANDOM: 288
BILI TOTAL: 0.4
BUN/CREAT: (no result)
BUN: 8
CALCIUM: 9.1
CHLORIDE: 111
CO2: 24
CREATININE: 1.43
EOS COUNT: (no result)
EOSINOPHIL %: 7.4
GLOBULIN TOT: (no result)
HCT: 34.6
HGB: 11.4
HGBA1C: (no result)
LYMPHS %: 29.1
MCH: 29.6
MCHC: (no result)
MCV: 89.9
MONOCYTE %: 5
MONOSCT AUTO: (no result)
PLATELETS: (no result)
PMN %: 57.8
POTASSIUM: 4.3
PROTEIN, TOT: 6.8
RBC: (no result)
RDW: 13.1
SGOT (AST): 19
SGPT (ALT): 55
TSH: 0.62
WBC: (no result)
ZZ-GE-UNK: (no result)

## 2022-01-27 ENCOUNTER — OFFICE VISIT (OUTPATIENT)
Dept: URBAN - METROPOLITAN AREA MEDICAL CENTER 8 | Facility: MEDICAL CENTER | Age: 42
End: 2022-01-27

## 2022-03-25 ENCOUNTER — OFFICE VISIT (OUTPATIENT)
Dept: URBAN - METROPOLITAN AREA SURGERY CENTER 7 | Facility: SURGERY CENTER | Age: 42
End: 2022-03-25

## 2022-04-30 ENCOUNTER — TELEPHONE ENCOUNTER (OUTPATIENT)
Dept: URBAN - METROPOLITAN AREA CLINIC 121 | Facility: CLINIC | Age: 42
End: 2022-04-30

## 2022-05-01 ENCOUNTER — TELEPHONE ENCOUNTER (OUTPATIENT)
Dept: URBAN - METROPOLITAN AREA CLINIC 121 | Facility: CLINIC | Age: 42
End: 2022-05-01

## 2022-05-01 RX ORDER — PROMETHAZINE HYDROCHLORIDE 25 MG/1
TAKE 1 TABLET BY MOUTH EVERY EIGHT HOURS AS NEEDED FOR NAUSEA TABLET ORAL
Status: ACTIVE | COMMUNITY
Start: 2022-03-25

## 2022-05-01 RX ORDER — GABAPENTIN 100 MG/1
CAPSULE ORAL
Status: ACTIVE | COMMUNITY

## 2022-05-01 RX ORDER — INSULIN LISPRO 100 [IU]/ML
INJECTION, SOLUTION INTRAVENOUS; SUBCUTANEOUS
Status: ACTIVE | COMMUNITY

## 2022-05-01 RX ORDER — SODIUM SULFATE, POTASSIUM SULFATE, MAGNESIUM SULFATE 17.5; 3.13; 1.6 G/ML; G/ML; G/ML
TAKE AS DIRECTED MIX AND DRINK AS DIRECTED FOR COLONOSCOPY PREP SOLUTION, CONCENTRATE ORAL
Status: ACTIVE | COMMUNITY
Start: 2022-03-17

## 2022-05-01 RX ORDER — DICYCLOMINE HYDROCHLORIDE 10 MG/1
CAPSULE ORAL
Status: ACTIVE | COMMUNITY

## 2022-05-01 RX ORDER — ONDANSETRON HYDROCHLORIDE 4 MG/1
TAKE 1 TABLET BY MOUTH EVERY SIX HOURS AS NEEDED FOR NAUSEA TABLET, FILM COATED ORAL
Status: ACTIVE | COMMUNITY
Start: 2022-03-25

## 2023-05-02 NOTE — HPI-TODAY'S VISIT:
Pt reports that his gastroparesis symptoms have improved and his BG is also improved with insulin pump.  She reports that he is using much less insulin now than before when he did basal and sliding scale.  He reports that he has been following the recommendations for gastroparesis diet and symptoms have resolved.  No recent vomiting.  Pt was preparing breakfast during our telehealth video visit which was helpful for the visit.  Cosentyx Counseling:  I discussed with the patient the risks of Cosentyx including but not limited to worsening of Crohn's disease, immunosuppression, allergic reactions and infections.  The patient understands that monitoring is required including a PPD at baseline and must alert us or the primary physician if symptoms of infection or other concerning signs are noted.

## 2025-05-22 NOTE — PROGRESS NOTE
Assessment and Plan


Assessment and plan: 


40 YO Male with DM, Gastritis presents to ED for evaluation. Pt states that he 

has experienced nausea, and multiple episodes of vomiting over the past 1 day 

with persistent symptoms over the same time frame. Pt transported to [facility] via 

private vehicle. Pt seen and evaluated in ED and found to have DKA, Sepsis, 

Acidosis, and ARIANNA. Pt found to be critically ill and admitted to ICU and 

initiated on DKA and Sepsis protocols. Pt denies fever, chills, CP, 

palpitations, Trauma, BRBPR, Productive cough, skin rash or recent ill contacts.

Pulmonary team consulted in ED.








Acute Hypoxemic Respiratory Failure


Bilateral Pulmonary infiltrates (suspect Pulm edema over Pneumonia)


DKA (diabetic ketoacidosis)


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


severe metabolic acidosis


Severe dehydration


Sepsis


Hypokalemia


Tachycardia


Chronic Diastolic Heart failure


Hypophostemia


Hypomagnesemia


Severe Protien calorie Malnutrition


ARIANNA (acute kidney injury) secondary to vasomotor nephropathy


Hypernatremia--RESOLVED








PLAN


Continues on High flow oxygen and improving,


Downgrade to IMCU


Replace Electrolytes. With patients personal hx of Chronic Diarrhea, he will 

probably need daily supplementation


Case management consult. Patient reports he ran out of Insulin.


Continue Abx, and will discuss with Pulmonary if to descalate if repeat CBC is 

negative


Add Questran and imodium


Lasix Held


Continue 70/30, was a bit hypoglycemia, may decrease to 15 units


Follow cultures


Dietician consult


DVT/GI prophy


Aspiration Precautions. Patient reports unable to chew food properly, also 

asking for supplements. 








The high probability of a clinically significant, sudden or life threatening 

deterioration of the [GI, pulm, endocrine] system(s) required my full and direct

attention, intervention and personal management. The aggregate critical care 

time was [45] minutes. This time is in addition to time spent performing 

reported procedures but includes the following: 





[x] Data Review and interpretation 





[x] Patient assessment and monitoring of vital signs 





[x] Documentation 





[x] Medication orders and management























History


Interval history: 


Patient seen and examined, still with shortness of breath and chronic diarrhea. 

Pt denies fever, chills, CP, palpitations, Trauma, BRBPR, Productive cough, skin

 rash or recent ill contacts. 





Hospitalist Physical





- Constitutional


Vitals: 


                                        











Temp Pulse Resp BP Pulse Ox


 


 98.9 F   110 H  14   113/80   95 


 


 12/01/19 04:00  12/01/19 09:27  12/01/19 07:00  12/01/19 09:27  12/01/19 07:44











General appearance: Present: mild distress, cachectic, other (lathergic)





- EENT


Eyes: Present: PERRL, EOM intact


ENT: hearing intact, clear oral mucosa





- Neck


Neck: Present: supple, normal ROM





- Respiratory


Respiratory effort: normal


Respiratory: bilateral: diminished





- Cardiovascular


Rhythm: regular


Heart Sounds: Present: S1 & S2.  Absent: systolic murmur, diastolic murmur





- Extremities


Extremities: no ischemia, pulses intact, pulses symmetrical


Peripheral Pulses: within normal limits





- Abdominal


General gastrointestinal: soft, non-tender, non-distended, normal bowel sounds





- Integumentary


Integumentary: Present: clear, warm, dry





- Psychiatric


Psychiatric: appropriate mood/affect





- Neurologic


Neurologic: CNII-XII intact, moves all extremities





- Allied Health


Allied health notes reviewed: nursing, social work





Results





- Labs


CBC & Chem 7: 


                                 11/28/19 11:54





                                 12/01/19 06:00


Labs: 


                             Laboratory Last Values











WBC  17.7 K/mm3 (4.5-11.0)  H  11/28/19  11:54    


 


RBC  4.66 M/mm3 (3.65-5.03)   11/28/19  11:54    


 


Hgb  13.2 gm/dl (11.8-15.2)   11/28/19  11:54    


 


Hct  41.5 % (35.5-45.6)   11/28/19  11:54    


 


MCV  89 fl (84-94)   11/28/19  11:54    


 


MCH  28 pg (28-32)   11/28/19  11:54    


 


MCHC  32 % (32-34)   11/28/19  11:54    


 


RDW  15.0 % (13.2-15.2)   11/28/19  11:54    


 


Plt Count  237 K/mm3 (140-440)   11/28/19  11:54    


 


Add Manual Diff  Complete   11/28/19  11:54    


 


Total Counted  100   11/28/19  11:54    


 


Seg Neutrophils %  Np   11/28/19  11:54    


 


Seg Neuts % (Manual)  69.0 % (40.0-70.0)   11/28/19  11:54    


 


Band Neutrophils %  23.0 %  11/28/19  11:54    


 


Lymphocytes % (Manual)  4.0 % (13.4-35.0)  L  11/28/19  11:54    


 


Reactive Lymphs % (Man)  0 %  11/28/19  11:54    


 


Monocytes % (Manual)  0 % (0.0-7.3)   11/28/19  11:54    


 


Eosinophils % (Manual)  0 % (0.0-4.3)   11/28/19  11:54    


 


Basophils % (Manual)  0 % (0.0-1.8)   11/28/19  11:54    


 


Metamyelocytes %  4.0 %  11/28/19  11:54    


 


Myelocytes %  0 %  11/28/19  11:54    


 


Promyelocytes %  0 %  11/28/19  11:54    


 


Blast Cells %  0 %  11/28/19  11:54    


 


Nucleated RBC %  Not Reportable   11/28/19  11:54    


 


Seg Neutrophils # Man  12.2 K/mm3 (1.8-7.7)  H  11/28/19  11:54    


 


Band Neutrophils #  4.1 K/mm3  11/28/19  11:54    


 


Lymphocytes # (Manual)  0.7 K/mm3 (1.2-5.4)  L  11/28/19  11:54    


 


Abs React Lymphs (Man)  0.0 K/mm3  11/28/19  11:54    


 


Monocytes # (Manual)  0.0 K/mm3 (0.0-0.8)   11/28/19  11:54    


 


Eosinophils # (Manual)  0.0 K/mm3 (0.0-0.4)   11/28/19  11:54    


 


Basophils # (Manual)  0.0 K/mm3 (0.0-0.1)   11/28/19  11:54    


 


Metamyelocytes #  0.7 K/mm3  11/28/19  11:54    


 


Myelocytes #  0.0 K/mm3  11/28/19  11:54    


 


Promyelocytes #  0.0 K/mm3  11/28/19  11:54    


 


Blast Cells #  0.0 K/mm3  11/28/19  11:54    


 


WBC Morphology  Not Reportable   11/28/19  11:54    


 


Hypersegmented Neuts  Not Reportable   11/28/19  11:54    


 


Hyposegmented Neuts  Not Reportable   11/28/19  11:54    


 


Hypogranular Neuts  Not Reportable   11/28/19  11:54    


 


Smudge Cells  Not Reportable   11/28/19  11:54    


 


Toxic Granulation  1+   11/28/19  11:54    


 


Toxic Vacuolation  Few   11/28/19  11:54    


 


Dohle Bodies  Not Reportable   11/28/19  11:54    


 


Pelger-Huet Anomaly  Not Reportable   11/28/19  11:54    


 


Jose Rods  Not Reportable   11/28/19  11:54    


 


Platelet Estimate  Consistent w auto   11/28/19  11:54    


 


Clumped Platelets  Not Reportable   11/28/19  11:54    


 


Plt Clumps, EDTA  Not Reportable   11/28/19  11:54    


 


Large Platelets  Not Reportable   11/28/19  11:54    


 


Giant Platelets  Not Reportable   11/28/19  11:54    


 


Platelet Satelliting  Not Reportable   11/28/19  11:54    


 


Plt Morphology Comment  Not Reportable   11/28/19  11:54    


 


RBC Morphology  Normal   11/28/19  11:54    


 


Dimorphic RBCs  Not Reportable   11/28/19  11:54    


 


Polychromasia  Not Reportable   11/28/19  11:54    


 


Hypochromasia  Not Reportable   11/28/19  11:54    


 


Poikilocytosis  Not Reportable   11/28/19  11:54    


 


Anisocytosis  Not Reportable   11/28/19  11:54    


 


Microcytosis  Not Reportable   11/28/19  11:54    


 


Macrocytosis  Not Reportable   11/28/19  11:54    


 


Spherocytes  Not Reportable   11/28/19  11:54    


 


Pappenheimer Bodies  Not Reportable   11/28/19  11:54    


 


Sickle Cells  Not Reportable   11/28/19  11:54    


 


Target Cells  Not Reportable   11/28/19  11:54    


 


Tear Drop Cells  Not Reportable   11/28/19  11:54    


 


Ovalocytes  Not Reportable   11/28/19  11:54    


 


Helmet Cells  Not Reportable   11/28/19  11:54    


 


Al-[place] Bodies  Not Reportable   11/28/19  11:54    


 


Cabot Rings  Not Reportable   11/28/19  11:54    


 


[place] Cells  Not Reportable   11/28/19  11:54    


 


Bite Cells  Not Reportable   11/28/19  11:54    


 


Crenated Cell  Not Reportable   11/28/19  11:54    


 


Elliptocytes  Not Reportable   11/28/19  11:54    


 


Acanthocytes (Spur)  Not Reportable   11/28/19  11:54    


 


Rouleaux  Not Reportable   11/28/19  11:54    


 


Hemoglobin C Crystals  Not Reportable   11/28/19  11:54    


 


Schistocytes  Not Reportable   11/28/19  11:54    


 


Malaria parasites  Not Reportable   11/28/19  11:54    


 


[place] Bodies  Not Reportable   11/28/19  11:54    


 


Hem Pathologist Commnt  No   11/28/19  11:54    


 


D-Dimer  5088.17 ng/mlDDU (0-234)  H  11/28/19  11:47    


 


POC ABG pH  7.453  (7.35-7.45)  H  11/28/19  21:30    


 


POC ABG pCO2  30.0  (35-45)  L  11/28/19  21:30    


 


POC ABG pO2  71  ()  L  11/28/19  13:04    


 


POC ABG HCO3  21.0  (22-26 mml/L)   11/28/19  21:30    


 


POC ABG Total CO2  22  (23-27mmol/L)   11/28/19  21:30    


 


POC ABG O2 Sat  76   11/28/19  21:30    


 


POC ABG Base Excess  -3  ((-2) - (+3)mmol/L)   11/28/19  21:30    


 


VBG pH  6.993  (7.320-7.420)  L*  11/26/19  18:43    


 


FiO2  70 %  11/28/19  21:30    


 


Sodium  143 mmol/L (137-145)   12/01/19  06:00    


 


Potassium  2.5 mmol/L (3.6-5.0)  L* D 12/01/19  06:00    


 


Chloride  106.9 mmol/L ()   12/01/19  06:00    


 


Carbon Dioxide  19 mmol/L (22-30)  L  12/01/19  06:00    


 


Anion Gap  20 mmol/L  12/01/19  06:00    


 


BUN  5 mg/dL (9-20)  L  12/01/19  06:00    


 


Creatinine  1.0 mg/dL (0.8-1.5)   12/01/19  06:00    


 


Estimated GFR  > 60 ml/min  12/01/19  06:00    


 


BUN/Creatinine Ratio  5 %  12/01/19  06:00    


 


Glucose  42 mg/dL ()  L  12/01/19  06:00    


 


POC Glucose  219  ()  H  11/30/19  21:27    


 


Hemoglobin A1c  15.1 % (4-6)  H  11/28/19  11:54    


 


Lactic Acid  1.60 mmol/L (0.7-2.0)   11/28/19  22:53    


 


Calcium  8.1 mg/dL (8.4-10.2)  L  12/01/19  06:00    


 


Phosphorus  3.20 mg/dL (2.5-4.5)  D 11/30/19  06:25    


 


Magnesium  2.10 mg/dL (1.7-2.3)   11/30/19  06:25    


 


Total Bilirubin  0.20 mg/dL (0.1-1.2)   11/28/19  11:47    


 


Direct Bilirubin  < 0.2 mg/dL (0-0.2)   11/26/19  18:43    


 


Indirect Bilirubin  0.0 mg/dL  11/26/19  18:43    


 


AST  39 units/L (5-40)   11/28/19  11:47    


 


ALT  11 units/L (7-56)   11/28/19  11:47    


 


Alkaline Phosphatase  150 units/L ()  H  11/28/19  11:47    


 


Total Creatine Kinase  140 units/L ()   11/28/19  18:43    


 


CK-MB (CK-2)  3.5 ng/mL (0.0-4.0)   11/28/19  18:43    


 


CK-MB (CK-2) Rel Index  2.5  (0-4)   11/28/19  18:43    


 


Troponin T  < 0.010 ng/mL (0.00-0.029)   11/28/19  18:43    


 


NT-Pro-B Natriuret Pep  1292 pg/mL (0-450)  H  11/28/19  18:43    


 


Total Protein  5.6 g/dL (6.3-8.2)  L D 11/28/19  11:47    


 


Albumin  3.4 g/dL (3.9-5)  L  11/28/19  11:47    


 


Albumin/Globulin Ratio  1.5 %  11/28/19  11:47    


 


Lipase  64 units/L (13-60)  H  11/26/19  18:43    


 


Urine Color  Straw  (Yellow)   11/26/19  21:29    


 


Urine Turbidity  Clear  (Clear)   11/26/19  21:29    


 


Urine pH  5.0  (5.0-7.0)   11/26/19  21:29    


 


Ur Specific Gravity  1.013  (1.003-1.030)   11/26/19  21:29    


 


Urine Protein  100 mg/dl mg/dL (Negative)   11/26/19  21:29    


 


Urine Glucose (UA)  >=500 mg/dL (Negative)   11/26/19  21:29    


 


Urine Ketones  80 mg/dL (Negative)   11/26/19  21:29    


 


Urine Blood  Sm  (Negative)   11/26/19  21:29    


 


Urine Nitrite  Neg  (Negative)   11/26/19  21:29    


 


Urine Bilirubin  Neg  (Negative)   11/26/19  21:29    


 


Urine Urobilinogen  < 2.0 mg/dL (<2.0)   11/26/19  21:29    


 


Ur Leukocyte Esterase  Neg  (Negative)   11/26/19  21:29    


 


Urine WBC (Auto)  1.0 /HPF (0.0-6.0)   11/26/19  21:29    


 


Urine RBC (Auto)  3.0 /HPF (0.0-6.0)   11/26/19  21:29    


 


U Epithel Cells (Auto)  1.0 /HPF (0-13.0)   11/26/19  21:29    


 


Urine Bacteria (Auto)  1+ /HPF (Negative)   11/26/19  21:29    


 


Urine Mucus  Few /HPF  11/26/19  21:29    


 


Urine Opiates Screen  Presumptive negative   11/29/19  18:20    


 


Urine Methadone Screen  Presumptive negative   11/29/19  18:20    


 


Ur Barbiturates Screen  Presumptive negative   11/29/19  18:20    


 


Ur Phencyclidine Scrn  Presumptive negative   11/29/19  18:20    


 


Ur Amphetamines Screen  Presumptive negative   11/29/19  18:20    


 


U Benzodiazepines Scrn  Presumptive negative   11/29/19  18:20    


 


Urine Cocaine Screen  Presumptive negative   11/29/19  18:20    


 


U Marijuana (THC) Screen  Presumptive negative   11/29/19  18:20    


 


Drugs of Abuse Note  Disclamer   11/29/19  18:20    














- Imaging and Cardiology


Chest x-ray: image reviewed


CT scan - chest: image reviewed (interstitial edema)





Active Medications





- Current Medications


Current Medications: 














Generic Name Dose Route Start Last Admin





  Trade Name Freq  PRN Reason Stop Dose Admin


 


Al Hydrox/Mg Hydrox/Simethicone  15 ml  11/27/19 19:03 





  Alum-Mag Hydrox-Simeth 553-709-15il/5ml  PO  





  Q4H PRN  





  Indigestion  


 


Cholestyramine Resin  8 gm  12/01/19 10:00 





  Questran  PO  





  BID SAI  


 


Dextrose  0 ml  11/28/19 13:16 





  D50w (25gm) Syringe  IV  





  Q30MIN PRN  





  Hypoglycemia  





  Protocol  


 


Enoxaparin Sodium  40 mg  11/28/19 22:00  11/30/19 21:19





  Enoxaparin  SUB-Q   40 mg





  QDAY@2200 SAI   Administration


 


Famotidine  20 mg  11/30/19 10:00  12/01/19 09:27





  Pepcid  PO   20 mg





  BID SAI   Administration


 


Azithromycin 500 mg/ Sodium  250 mls @ 250 mls/hr  11/28/19 18:00  11/30/19 

09:53





  Chloride  IV  12/01/19 17:59  250 mls/hr





  Q24HR SAI   Administration





  Protocol  


 


Potassium Chloride 10 meq/  1,005 mls @ 100 mls/hr  12/01/19 04:15  12/01/19 

07:48





  Sodium Chloride  IV   100 mls/hr





  AS DIRECT SAI   Administration


 


Potassium Chloride  10 meq in 100 mls @ 100 mls/hr  12/01/19 10:00 





  Kcl 10meq/100ml  IV  12/01/19 13:59 





  [facility]  


 


Insulin Human Isoph/Insulin Regular  20 unit  12/01/19 08:00 





  Humulin 70/30  SUB-Q  





  [facility]DIAB [facility]  


 


Insulin Human Lispro  0 unit  11/29/19 11:30  12/01/19 08:24





  Humalog  SUB-Q   Not Given





  [facility]  





  Protocol  


 


Loperamide HCl  2 mg  12/01/19 09:39 





  Imodium A-D  PO  





  Q2H PRN  





  Diarrhea  


 


Loperamide HCl  4 mg  12/01/19 09:39 





  Imodium A-D  PO  12/01/19 09:40 





  ONCE ONE  


 


Metoprolol Tartrate  12.5 mg  11/28/19 22:00  12/01/19 09:27





  Metoprolol  PO   12.5 mg





  BID SAI   Administration


 


Ondansetron HCl  4 mg  11/27/19 19:03  11/27/19 21:20





  Zofran  IV   4 mg





  Q4H PRN   Administration





  Nausea And Vomiting  


 


Sodium Chloride  10 ml  11/26/19 22:00  11/30/19 21:25





  Sodium Chloride Flush Syringe 10 Ml  IV   10 ml





  BID SAI   Administration


 


Sodium Chloride  10 ml  11/26/19 20:04 





  Sodium Chloride Flush Syringe 10 Ml  IV  





  PRN PRN  





  LINE FLUSH  


 


Zolpidem Tartrate  5 mg  11/27/19 19:05 





  Ambien  PO  





  QHS PRN  





  Sleep  














Nutrition/Malnutrition Assess





- Dietary Evaluation


Nutrition/Malnutrition Findings: 


                                        





Nutrition Notes                                            Start:  11/28/19 

11:49


Freq:                                                      Status: Active       

 


Protocol:                                                                       

 


 Document     11/28/19 11:49  LM  (Rec: 11/28/19 12:08  LM  [id number])


 Nutrition Notes


     Need for Assessment generated from:         Low BMI


     Initial or Follow up                        Assessment


     Current Diagnosis                           Diabetes


     Other Pertinent Diagnosis                   DKA


     Current Diet                                Consistent CHO


     Labs/Tests                                  


                                                 BUN 7


     Pertinent Medications                       Humulin


                                                 Humalog


     Height                                      5 ft 4 in


     Weight                                      43.2 kg


     Usual Body Weight                           62.7 kg


     Ideal Body Weight (kg)                      59.09


     BMI                                         16.3


     Weight change and time frame                31% wt loss (43 lb). Time


                                                 frame unknown


     Subjective/Other Information                Low BMI screen. Pt stated that


                                                 he ate a fruit cup and water


                                                 for breakfast. Pt stated he


                                                 drinks a lot of water at home


                                                 when asked about food. Pt


                                                 denied any wt changes. Pt said


                                                 his UBW is 138 lb (62.7 kg).


                                                 Observed wasting in legs.


     Burn                                        Absent


     Trauma                                      Absent


     Current % PO                                Poor (25-49%)


     Minimum of two criteria                     Yes


     Interpretation of Weight Loss (severe)      > 20% in 1 year


     Body Fat Depletion                          Mild depletion (non-severe)


     #2


      Nutrition Diagnosis                        Food and nutrition-related


                                                 knowledge deficit


      Etiology                                   pt non complient with DM diet


      As Evidenced by Signs and Symptoms         Pt needing DM diet education,


                                                 


     #1


      Nutrition Diagnosis                        Malnutrition


      Etiology                                   DKA


      As Evidenced by Signs and Symptoms         31% wt loss, muscle and fat


                                                 wasting


     Is patient on ventilator?                   No


     Is Patient Ambulatory and/or Out of Bed     Yes


     REE-(Glascock-St. Jeor-ambulatory/OOB) [     9041.900


      [email or web address]]                               


     Kcal/Kg value to use for calculation        49


     Approximate Energy Requirements Using       2117


      kcal/Kg                                    


     Calculation Used for Recommendations        Kcal/kg


     Additional Notes                            Protein: 52-65g (1.2-1.5g/kg)


                                                 Fluid: 1 ml/kcal


 Nutrition Intervention


     Change Diet Order:                          Continue consistent CHO


     Teaching Recipient                          Patient


     Learning Readiness                          Fair


     Teaching Methods                            Discussion,Handout


     Response to Teaching                        Reinforcement needed


     Education Handouts Provided                 Carbohydrate Counting for


                                                 People with Diabetes


     Barriers to Learning                        Cognitive/Verbal


     RD phone number provided                    Yes


     Patient aware of follow up options          Yes


     Goal #1                                     Meet at least 80% of energy


                                                 and protein needs


     Anticipated Discharge Needs:                Consisntent CHO


     Follow-Up By:                               12/02/19


     Additional Comments                         F/U for intakes, ONS needs, DM


                                                 diet education reinforcement











- Malnutrition Assessment


Minimum of two criteria: Yes





- Attestation Statement


I have reviewed and agreed w/ Malnutrition eval & tx plan: Yes Spoke with patient and discussed wound culture results. Sent in prescription for to pharmacy.